# Patient Record
Sex: FEMALE | Race: WHITE | NOT HISPANIC OR LATINO | Employment: FULL TIME | URBAN - NONMETROPOLITAN AREA
[De-identification: names, ages, dates, MRNs, and addresses within clinical notes are randomized per-mention and may not be internally consistent; named-entity substitution may affect disease eponyms.]

---

## 2017-02-17 ENCOUNTER — OFFICE VISIT (OUTPATIENT)
Dept: FAMILY MEDICINE | Facility: OTHER | Age: 27
End: 2017-02-17
Attending: NURSE PRACTITIONER
Payer: COMMERCIAL

## 2017-02-17 VITALS
HEIGHT: 66 IN | WEIGHT: 190 LBS | TEMPERATURE: 98.7 F | OXYGEN SATURATION: 99 % | SYSTOLIC BLOOD PRESSURE: 108 MMHG | DIASTOLIC BLOOD PRESSURE: 82 MMHG | BODY MASS INDEX: 30.53 KG/M2 | RESPIRATION RATE: 22 BRPM | HEART RATE: 82 BPM

## 2017-02-17 DIAGNOSIS — R07.0 THROAT PAIN: Primary | ICD-10-CM

## 2017-02-17 DIAGNOSIS — L63.9 ALOPECIA AREATA: ICD-10-CM

## 2017-02-17 DIAGNOSIS — H66.001 ACUTE SUPPURATIVE OTITIS MEDIA OF RIGHT EAR WITHOUT SPONTANEOUS RUPTURE OF TYMPANIC MEMBRANE, RECURRENCE NOT SPECIFIED: ICD-10-CM

## 2017-02-17 DIAGNOSIS — E03.9 HYPOTHYROIDISM, UNSPECIFIED TYPE: ICD-10-CM

## 2017-02-17 LAB
DEPRECATED S PYO AG THROAT QL EIA: NORMAL
MICRO REPORT STATUS: NORMAL
SPECIMEN SOURCE: NORMAL
TSH SERPL DL<=0.05 MIU/L-ACNC: 7.85 MU/L (ref 0.4–4)

## 2017-02-17 PROCEDURE — 87081 CULTURE SCREEN ONLY: CPT | Performed by: NURSE PRACTITIONER

## 2017-02-17 PROCEDURE — 87880 STREP A ASSAY W/OPTIC: CPT | Performed by: NURSE PRACTITIONER

## 2017-02-17 PROCEDURE — 84443 ASSAY THYROID STIM HORMONE: CPT | Performed by: NURSE PRACTITIONER

## 2017-02-17 PROCEDURE — 99213 OFFICE O/P EST LOW 20 MIN: CPT | Performed by: NURSE PRACTITIONER

## 2017-02-17 PROCEDURE — 36415 COLL VENOUS BLD VENIPUNCTURE: CPT | Performed by: NURSE PRACTITIONER

## 2017-02-17 RX ORDER — LEVOTHYROXINE SODIUM 50 UG/1
50 TABLET ORAL DAILY
Qty: 90 TABLET | Refills: 1 | Status: SHIPPED | OUTPATIENT
Start: 2017-02-17 | End: 2017-04-18

## 2017-02-17 RX ORDER — AZITHROMYCIN 250 MG/1
TABLET, FILM COATED ORAL
Qty: 6 TABLET | Refills: 0 | Status: SHIPPED | OUTPATIENT
Start: 2017-02-17 | End: 2017-03-15

## 2017-02-17 ASSESSMENT — PAIN SCALES - GENERAL: PAINLEVEL: MILD PAIN (3)

## 2017-02-17 NOTE — NURSING NOTE
"Chief Complaint   Patient presents with     Throat Problem       Initial /82 (BP Location: Left arm, Patient Position: Chair, Cuff Size: Adult Regular)  Pulse 82  Temp 98.7  F (37.1  C) (Tympanic)  Resp 22  Ht 5' 5.5\" (1.664 m)  Wt 190 lb (86.2 kg)  SpO2 99%  BMI 31.14 kg/m2 Estimated body mass index is 31.14 kg/(m^2) as calculated from the following:    Height as of this encounter: 5' 5.5\" (1.664 m).    Weight as of this encounter: 190 lb (86.2 kg).  Medication Reconciliation: complete    "

## 2017-02-17 NOTE — MR AVS SNAPSHOT
After Visit Summary   2/17/2017    Gale Adams    MRN: [id number]           Patient Information     Date Of Birth          1990        Visit Information        Provider Department      2/17/2017 11:40 AM Danii Landa APRN [facility] [place]        Today's Diagnoses     Throat pain    -  1    Alopecia areata        Acute suppurative otitis media of right ear without spontaneous rupture of tympanic membrane, recurrence not specified          Care Instructions      Self-Care for Sore Throats  Sore throats occur for many reasons, such as colds, allergies, and infections caused by viruses or bacteria. In any case, your throat becomes red and sore. Your goal for self-care is to reduce your discomfort while giving your throat a chance to heal.    Moisten and Soothe Your Throat    Try a sip of water first thing after waking up.    Keep your throat moist by drinking 6 or more glasses of clear liquids every day.    Run a cool-air humidifier in your room overnight.    Avoid cigarette smoke.     Suck on throat lozenges, cough drops, hard candy, ice chips, or frozen fruit-juice bars. Use the sugar-free versions if your diet or medical condition require them.    Warm tea and honey  Gargle to Ease Irritation  Gargling every hour or 2 can ease irritation. Try gargling with 1 of these solutions:    1/4 teaspoon of salt in 1/2 cup of warm water    An over-the-counter anesthetic gargle  Use Medication for More Relief  Over-the-counter medication can reduce sore throat symptoms. Ask your pharmacist if you have questions about which medication to use:    Ease pain with anesthetic sprays. Aspirin or an aspirin substitute also helps. Remember, never give aspirin to anyone 18 or younger, or if you are already taking blood thinners.     For sore throats caused by allergies, try antihistamines to block the allergic reaction.    Remember: unless a sore throat is caused by a bacterial infection,  antibiotics won t help you.  Prevent Future Sore Throats    Stop smoking or reduce contact with secondhand smoke. Smoke irritates the tender throat lining.    Limit contact with pets and with allergy-causing substances, such as pollen and mold.    When you re around someone with a sore throat or cold, wash your hands frequently to keep viruses or bacteria from spreading.    Don t strain your vocal cords.  Call Your Health Care Provider  Contact your doctor if you have:    A temperature over 101 F (38.3 C)    White spots on the throat    Great difficulty swallowing    Trouble breathing    A skin rash    Recent exposure to someone else with strep bacteria    Severe hoarseness and swollen glands in the neck or jaw     7267-2045 [employer]. [address] [place] [address]. All rights reserved. This information is not intended as a substitute for professional medical care. Always follow your healthcare professional's instructions.              Follow-ups after your visit        Follow-up notes from your care team     Return if symptoms worsen or fail to improve.      Who to contact     If you have questions or need follow up information about today's clinic visit or your schedule please contact [facility] directly at [phone].  Normal or non-critical lab and imaging results will be communicated to you by [employer], letter or phone within 4 business days after the clinic has received the results. If you do not hear from us within 7 days, please contact the clinic through [employer] or phone. If you have a critical or abnormal lab result, we will notify you by phone as soon as possible.  Submit refill requests through [employer] or call your pharmacy and they will forward the refill request to us. Please allow 3 business days for your refill to be completed.          Additional Information About Your Visit        [employer] Information     [employer] gives you secure access to your electronic  "health record. If you see a primary care provider, you can also send messages to your care team and make appointments. If you have questions, please call your primary care clinic.  If you do not have a primary care provider, please call [phone] and they will assist you.        Care EveryWhere ID     This is your Care EveryWhere ID. This could be used by other organizations to access your [facility] medical records  [id number]        Your Vitals Were     Pulse Temperature Respirations Height Pulse Oximetry BMI (Body Mass Index)    82 98.7  F (37.1  C) (Tympanic) 22 5' 5.5\" (1.664 m) 99% 31.14 kg/m2       Blood Pressure from Last 3 Encounters:   02/17/17 108/82   07/12/16 120/68   06/17/16 112/70    Weight from Last 3 Encounters:   02/17/17 190 lb (86.2 kg)   07/12/16 192 lb (87.1 kg)   06/17/16 192 lb (87.1 kg)              We Performed the Following     Beta strep group A culture     Rapid strep screen     TSH          Today's Medication Changes          These changes are accurate as of: 2/17/17 12:37 PM.  If you have any questions, ask your nurse or doctor.               Start taking these medicines.        Dose/Directions    azithromycin 250 MG tablet   Commonly known as:  ZITHROMAX   Used for:  Acute suppurative otitis media of right ear without spontaneous rupture of tympanic membrane, recurrence not specified   Started by:  Danii Landa APRN CNP        Two tablets first day, then one tablet daily for four days.   Quantity:  6 tablet   Refills:  0            Where to get your medicines      These medications were sent to [facility] [place] - [address]  [address] [place] [address]     Phone:  [phone]     azithromycin 250 MG tablet                Primary Care Provider Office Phone # Fax #    BRIA Nuñez [phone]       [facility] [address] [address]        Thank you!     Thank you for choosing [facility]  [facility]. Our goal is always to provide you with excellent care. Hearing back from our patients is one way we can continue to improve our services. Please take a few minutes to complete the written survey that you may receive in the mail after your visit with us. Thank you!             Your Updated Medication List - Protect others around you: Learn how to safely use, store and throw away your medicines at www.disposemymeds.org.          This list is accurate as of: 2/17/17 12:37 PM.  Always use your most recent med list.                   Brand Name Dispense Instructions for use    azithromycin 250 MG tablet    ZITHROMAX    6 tablet    Two tablets first day, then one tablet daily for four days.       GNP PRENATAL VITAMINS 28-0.8 MG Tabs     100 tablet    Take 1 tablet by mouth daily       levothyroxine 25 MCG tablet    SYNTHROID/LEVOTHROID    90 tablet    TAKE 1 TABLET BY MOUTH DAILY

## 2017-02-17 NOTE — PROGRESS NOTES
SUBJECTIVE:                                                    Gale Adams is a 26 year old female who presents to clinic today for the following health issues:    Last week Gale had stomach flu symptoms with nausea, vomiting and diahrrea for 2 days.    RESPIRATORY SYMPTOMS      Duration: 4 days    Description  nasal congestion, rhinorrhea, sore throat, cough, ear pain right, headache, fatigue/malaise, hoarse voice.  Last week  Nausea/vomiting  and diarrhea    Severity: severe    Accompanying signs and symptoms: None    History (predisposing factors):  none    Precipitating or alleviating factors: None    Therapies tried and outcome:  acetaminophen [place], severe cold and flu     Gale states she noticed any area at the back of her scalp without any hair. She stated she had her hair done last week and nothing was said and she just noticed it today.  Gale has a recent diagnosis of thyroid disease and take levothyroxine.         Problem list and histories reviewed & adjusted, as indicated.  Additional history: as documented    Patient Active Problem List   Diagnosis     Swelling, mass, or lump in head and neck     Left knee injury     ACP (advance care planning)     History reviewed. No pertinent past surgical history.    Social History   Substance Use Topics     Smoking status: Never Smoker     Smokeless tobacco: Never Used     Alcohol use No     Family History   Problem Relation Age of Onset     CEREBROVASCULAR DISEASE Maternal Grandmother      CVA     DIABETES Maternal Grandmother      Breast Cancer Maternal Grandmother      Hypertension Father      Hyperlipidemia Mother      Depression Mother      Obesity Mother      Other Cancer Paternal Grandmother      Coronary Artery Disease No family hx of      Colon Cancer No family hx of      Prostate Cancer No family hx of      Anxiety Disorder No family hx of      MENTAL ILLNESS No family hx of      Substance Abuse No family hx of      Anesthesia Reaction  "No family hx of      Asthma No family hx of      OSTEOPOROSIS No family hx of      Genetic Disorder No family hx of      Thyroid Disease No family hx of          Current Outpatient Prescriptions   Medication Sig Dispense Refill     levothyroxine (SYNTHROID, LEVOTHROID) 25 MCG tablet TAKE 1 TABLET BY MOUTH DAILY 90 tablet 1     Prenatal Vit-Fe Fumarate-FA (GNP PRENATAL VITAMINS) 28-0.8 MG TABS Take 1 tablet by mouth daily 100 tablet 3     Allergies   Allergen Reactions     Amoxicillin Rash       ROS:  C: NEGATIVE for fever, chills, change in weight  ENT/MOUTH: ear pain bilateral, nasal congestion, sinus pressure, sore throat and tooth pain  RESP:cough-non productive  CV: NEGATIVE for chest pain, palpitations or peripheral edema    OBJECTIVE:                                                    /82 (BP Location: Left arm, Patient Position: Chair, Cuff Size: Adult Regular)  Pulse 82  Temp 98.7  F (37.1  C) (Tympanic)  Resp 22  Ht 5' 5.5\" (1.664 m)  Wt 190 lb (86.2 kg)  SpO2 99%  BMI 31.14 kg/m2  Body mass index is 31.14 kg/(m^2).   GENERAL: alert and no distress  HENT: normal cephalic/atraumatic, both ears: clear effusion, nose and mouth without ulcers or lesions, oropharynx clear, oral mucous membranes moist and sinuses: maxillary tenderness on both sides  NECK: no adenopathy, no asymmetry, masses, or scars and thyroid normal to palpation  RESP: decreased breath sounds R lower posterior and L lower posterior  CV: regular rate and rhythm, normal S1 S2, no S3 or S4, no murmur, click or rub, no peripheral edema and peripheral pulses strong  SKIN: no suspicious lesions or rashes and hair quality-alopecia at posterior scalp  PSYCH: mentation appears normal, affect normal/bright  LYMPH: normal ant/post cervical, supraclavicular nodes    Diagnostic Test Results:  Results for orders placed or performed in visit on 02/17/17 (from the past 24 hour(s))   Rapid strep screen   Result Value Ref Range    Specimen Description " Throat     Rapid Strep A Screen       NEGATIVE: No Group A streptococcal antigen detected by immunoassay, await   culture report.      Micro Report Status FINAL 02/17/2017         ASSESSMENT:                                                        PLAN:                                                    ASSESSMENT / PLAN:  (R07.0) Throat pain  (primary encounter diagnosis)  Comment:   Plan:  Rapid strep screen,    Beta strep group A culture   Stay hydrated   Cool mist humidifier   Cough medicine   Warm tea and honey   Tylenol or ibuprofen for comfort            (L63.9) Alopecia areata  Comment:   Plan:  TSH - will call with result            (H66.001) Acute suppurative otitis media of right ear without spontaneous rupture of tympanic membrane, recurrence not specified  Comment:   Plan:  azithromycin (ZITHROMAX) 250 MG tablet            Follow up if no improvement or worsening symptoms          DAMON [facility]er [facility] [address]

## 2017-02-17 NOTE — PATIENT INSTRUCTIONS
Self-Care for Sore Throats  Sore throats occur for many reasons, such as colds, allergies, and infections caused by viruses or bacteria. In any case, your throat becomes red and sore. Your goal for self-care is to reduce your discomfort while giving your throat a chance to heal.    Moisten and Soothe Your Throat    Try a sip of water first thing after waking up.    Keep your throat moist by drinking 6 or more glasses of clear liquids every day.    Run a cool-air humidifier in your room overnight.    Avoid cigarette smoke.     Suck on throat lozenges, cough drops, hard candy, ice chips, or frozen fruit-juice bars. Use the sugar-free versions if your diet or medical condition require them.    Warm tea and honey  Gargle to Ease Irritation  Gargling every hour or 2 can ease irritation. Try gargling with 1 of these solutions:    1/4 teaspoon of salt in 1/2 cup of warm water    An over-the-counter anesthetic gargle  Use Medication for More Relief  Over-the-counter medication can reduce sore throat symptoms. Ask your pharmacist if you have questions about which medication to use:    Ease pain with anesthetic sprays. Aspirin or an aspirin substitute also helps. Remember, never give aspirin to anyone 18 or younger, or if you are already taking blood thinners.     For sore throats caused by allergies, try antihistamines to block the allergic reaction.    Remember: unless a sore throat is caused by a bacterial infection, antibiotics won t help you.  Prevent Future Sore Throats    Stop smoking or reduce contact with secondhand smoke. Smoke irritates the tender throat lining.    Limit contact with pets and with allergy-causing substances, such as pollen and mold.    When you re around someone with a sore throat or cold, wash your hands frequently to keep viruses or bacteria from spreading.    Don t strain your vocal cords.  Call Your Health Care Provider  Contact your doctor if you have:    A temperature over 101 F  (38.3 C)    White spots on the throat    Great difficulty swallowing    Trouble breathing    A skin rash    Recent exposure to someone else with strep bacteria    Severe hoarseness and swollen glands in the neck or jaw     4578-1644 [employer] [address] [place] [address], [place] [address]. All rights reserved. This information is not intended as a substitute for professional medical care. Always follow your healthcare professional's instructions.

## 2017-02-19 LAB
BACTERIA SPEC CULT: NORMAL
MICRO REPORT STATUS: NORMAL
SPECIMEN SOURCE: NORMAL

## 2017-03-15 ENCOUNTER — OFFICE VISIT (OUTPATIENT)
Dept: FAMILY MEDICINE | Facility: OTHER | Age: 27
End: 2017-03-15
Attending: NURSE PRACTITIONER
Payer: COMMERCIAL

## 2017-03-15 VITALS
DIASTOLIC BLOOD PRESSURE: 74 MMHG | OXYGEN SATURATION: 98 % | BODY MASS INDEX: 30.86 KG/M2 | RESPIRATION RATE: 20 BRPM | HEART RATE: 79 BPM | SYSTOLIC BLOOD PRESSURE: 112 MMHG | TEMPERATURE: 98.7 F | HEIGHT: 66 IN | WEIGHT: 192 LBS

## 2017-03-15 DIAGNOSIS — E03.9 HYPOTHYROIDISM, UNSPECIFIED TYPE: Primary | ICD-10-CM

## 2017-03-15 PROCEDURE — 99213 OFFICE O/P EST LOW 20 MIN: CPT | Performed by: NURSE PRACTITIONER

## 2017-03-15 ASSESSMENT — PAIN SCALES - GENERAL: PAINLEVEL: NO PAIN (0)

## 2017-03-15 NOTE — NURSING NOTE
"Chief Complaint   Patient presents with     Thyroid Disease     fu on start of new thyroid medication       Initial /74 (BP Location: Right arm, Patient Position: Chair, Cuff Size: Adult Regular)  Pulse 79  Temp 98.7  F (37.1  C) (Tympanic)  Resp 20  Ht 5' 5.5\" (1.664 m)  Wt 192 lb (87.1 kg)  SpO2 98%  BMI 31.46 kg/m2 Estimated body mass index is 31.46 kg/(m^2) as calculated from the following:    Height as of this encounter: 5' 5.5\" (1.664 m).    Weight as of this encounter: 192 lb (87.1 kg).  Medication Reconciliation: complete   Ally Win      "

## 2017-03-15 NOTE — PROGRESS NOTES
SUBJECTIVE:                                                    Gale Adams is a 26 year old female who presents to clinic today for the following health issues:      Hypothyroidism Follow-up      Since last visit, patient describes the following symptoms: Weight stable, no hair loss, no skin changes, no constipation, no loose stools    Increased energy since increase     Bold spot remains on the back of the scalp - appears to be getting a couple of hair follicles back        Amount of exercise or physical activity: 4-5 days/week for an average of 30-45 minutes    Problems taking medications regularly: No    Medication side effects: none  Diet: regular (no restrictions)        Problem list and histories reviewed & adjusted, as indicated.  Additional history: as documented    Patient Active Problem List   Diagnosis     Swelling, mass, or lump in head and neck     Left knee injury     ACP (advance care planning)     History reviewed. No pertinent past surgical history.    Social History   Substance Use Topics     Smoking status: Never Smoker     Smokeless tobacco: Never Used     Alcohol use No     Family History   Problem Relation Age of Onset     CEREBROVASCULAR DISEASE Maternal Grandmother      CVA     DIABETES Maternal Grandmother      Breast Cancer Maternal Grandmother      Hypertension Father      Hyperlipidemia Mother      Depression Mother      Obesity Mother      Other Cancer Paternal Grandmother      Coronary Artery Disease No family hx of      Colon Cancer No family hx of      Prostate Cancer No family hx of      Anxiety Disorder No family hx of      MENTAL ILLNESS No family hx of      Substance Abuse No family hx of      Anesthesia Reaction No family hx of      Asthma No family hx of      OSTEOPOROSIS No family hx of      Genetic Disorder No family hx of      Thyroid Disease No family hx of          Current Outpatient Prescriptions   Medication Sig Dispense Refill     levothyroxine  "(SYNTHROID/LEVOTHROID) 50 MCG tablet Take 1 tablet (50 mcg) by mouth daily 90 tablet 1     [DISCONTINUED] levothyroxine (SYNTHROID, LEVOTHROID) 25 MCG tablet TAKE 1 TABLET BY MOUTH DAILY (Patient not taking: Reported on 3/15/2017) 90 tablet 1     Allergies   Allergen Reactions     Amoxicillin Rash       Reviewed and updated as needed this visit by clinical staff  Tobacco  Allergies  Meds  Med Hx  Surg Hx  Fam Hx  Soc Hx      Reviewed and updated as needed this visit by Provider         ROS:  C: NEGATIVE for fever, chills, change in weight  E/M: NEGATIVE for ear, mouth and throat problems  R: NEGATIVE for significant cough or SOB  CV: NEGATIVE for chest pain, palpitations or peripheral edema  ENDOCRINE: NEGATIVE for temperature intolerance, skin/hair changes - negative for further hair loss    OBJECTIVE:                                                    /74 (BP Location: Right arm, Patient Position: Chair, Cuff Size: Adult Regular)  Pulse 79  Temp 98.7  F (37.1  C) (Tympanic)  Resp 20  Ht 5' 5.5\" (1.664 m)  Wt 192 lb (87.1 kg)  SpO2 98%  BMI 31.46 kg/m2  Body mass index is 31.46 kg/(m^2).   GENERAL: healthy, alert and no distress  NECK: no adenopathy and no asymmetry, masses, or scars  RESP: lungs clear to auscultation - no rales, rhonchi or wheezes  CV: regular rate and rhythm, normal S1 S2, no S3 or S4, no murmur, click or rub, no peripheral edema and peripheral pulses strong  PSYCH: mentation appears normal, affect normal/bright    Diagnostic Test Results:  none      ASSESSMENT:                                                        PLAN:                                                    ASSESSMENT / PLAN:  (E03.9) Hypothyroidism, unspecified type  (primary encounter diagnosis)  Comment:   Plan:  TSH - future order - should have checked in one month          Follow up as needed          Danii Landa, DAMON [facility] [place]    "

## 2017-04-18 ENCOUNTER — OFFICE VISIT (OUTPATIENT)
Dept: FAMILY MEDICINE | Facility: OTHER | Age: 27
End: 2017-04-18
Attending: NURSE PRACTITIONER
Payer: COMMERCIAL

## 2017-04-18 VITALS
HEIGHT: 66 IN | HEART RATE: 76 BPM | RESPIRATION RATE: 20 BRPM | TEMPERATURE: 98.6 F | BODY MASS INDEX: 30.53 KG/M2 | WEIGHT: 190 LBS | OXYGEN SATURATION: 98 % | SYSTOLIC BLOOD PRESSURE: 108 MMHG | DIASTOLIC BLOOD PRESSURE: 60 MMHG

## 2017-04-18 DIAGNOSIS — L65.9 ALOPECIA: Primary | ICD-10-CM

## 2017-04-18 DIAGNOSIS — E03.9 HYPOTHYROIDISM, UNSPECIFIED TYPE: ICD-10-CM

## 2017-04-18 LAB — TSH SERPL DL<=0.05 MIU/L-ACNC: 6.72 MU/L (ref 0.4–4)

## 2017-04-18 PROCEDURE — 36415 COLL VENOUS BLD VENIPUNCTURE: CPT | Performed by: NURSE PRACTITIONER

## 2017-04-18 PROCEDURE — 84443 ASSAY THYROID STIM HORMONE: CPT | Performed by: NURSE PRACTITIONER

## 2017-04-18 PROCEDURE — 99213 OFFICE O/P EST LOW 20 MIN: CPT | Performed by: NURSE PRACTITIONER

## 2017-04-18 RX ORDER — LEVOTHYROXINE SODIUM 100 UG/1
100 TABLET ORAL DAILY
Qty: 30 TABLET | Refills: 1 | Status: SHIPPED | OUTPATIENT
Start: 2017-04-18 | End: 2017-05-30

## 2017-04-18 ASSESSMENT — PAIN SCALES - GENERAL: PAINLEVEL: NO PAIN (0)

## 2017-04-18 NOTE — MR AVS SNAPSHOT
After Visit Summary   4/18/2017    Gale Adams    MRN: [id number]           Patient Information     Date Of Birth          1990        Visit Information        Provider Department      4/18/2017 1:30 PM Danii Landa APRN [facility] [place]        Today's Diagnoses     Alopecia    -  1    Hypothyroidism, unspecified type          Care Instructions      Hypothyroidism       You have hypothyroidism. This means your thyroid gland is not making enough thyroid hormone. This hormone is vital to body growth and metabolism. If you don t make enough, many body processes slow down. This can cause symptoms throughout the body. Hypothyroidism can range from mild to severe. The most severe form is called myxedema.  There are a number of causes of hypothyroidism. A common cause is Hashimoto s disease. This disease causes the body s own immune system to attack the thyroid gland. When you have certain treatments, such as surgery to remove the thyroid gland, this can also cause hypothyroidism.  Symptoms of hypothyroidism can include:    Fatigue    Trouble concentrating or thinking clearly; forgetfulness    Dry skin    Hair loss    Weight gain    Low tolerance to cold    Constipation    Depression    Personality changes    Tingling or prickling of the hands or feet    Heavy, absent, or irregular periods (women only)  Older adults may sometimes have other symptoms. These can include:    Muscle aches and weakness    Confusion    Incontinence (unable to control urine or stool)    Trouble moving around    Falling  Treatment for hypothyroidism involves taking thyroid hormone pills daily. These pills replace the hormone your thyroid doesn t make. You will likely need to take a daily pill for the rest of your life. Tips for taking this medicine are given below.  Home care  Tips for taking your medicine    Take your thyroid hormone pills as prescribed by your healthcare provider. This is most  often 1 pill a day on an empty stomach. Use a pillbox labeled with the days of the week. This will help you remember to take your pill each day.    Don t take products that contain iron and calcium or antacids within 4 hours of taking your thyroid hormone pills.    Don t take other medicines with your thyroid hormone pill without checking with your provider first.    Tell your provider if you have any side effects from your medicines that bother you.    Never change the dosage or stop taking your thyroid pills without talking to your provider first.  General care    Always talk with your provider before trying other medicines or treatments for your thyroid problem.    If you see other healthcare providers, be sure to let them know about your thyroid problem.  Follow-up care  See your healthcare provider for checkups as advised. You may need regular tests to check the level of thyroid hormone in your blood.  When to seek medical advice  Call your healthcare provider right away if any of these occur:    New symptoms develop    Symptoms return, continue, or worsen even after treatment    Extreme fatigue    Puffy hands, face, or feet    Fast or irregular heartbeat    Confusion  Call 911  Call 911 right away if any of these occur:    Fainting    Chest pain    Shortness of breath or trouble breathing    2755-5613 [employer]. [address] [place], [place] [address]. All rights reserved. This information is not intended as a substitute for professional medical care. Always follow your healthcare professional's instructions.              Follow-ups after your visit        Additional Services     DERMATOLOGY REFERRAL       Your provider has referred you to: [facility]    Please be aware that coverage of these services is subject to the terms and limitations of your health insurance plan.  Call member services at your health plan with any benefit or coverage questions.      Please bring the  "following with you to your appointment:    (1) Any X-Rays, CTs or MRIs which have been performed.  Contact the facility where they were done to arrange for  prior to your scheduled appointment.    (2) List of current medications  (3) This referral request   (4) Any documents/labs given to you for this referral                  Who to contact     If you have questions or need follow up information about today's clinic visit or your schedule please contact [facility] [place] directly at [phone].  Normal or non-critical lab and imaging results will be communicated to you by MyChart, letter or phone within 4 business days after the clinic has received the results. If you do not hear from us within 7 days, please contact the clinic through [employer] or phone. If you have a critical or abnormal lab result, we will notify you by phone as soon as possible.  Submit refill requests through [employer] or call your pharmacy and they will forward the refill request to us. Please allow 3 business days for your refill to be completed.          Additional Information About Your Visit        [employer] Information     [employer] gives you secure access to your electronic health record. If you see a primary care provider, you can also send messages to your care team and make appointments. If you have questions, please call your primary care clinic.  If you do not have a primary care provider, please call [phone] and they will assist you.        Care EveryWhere ID     This is your Care EveryWhere ID. This could be used by other organizations to access your [facility] medical records  [id number]        Your Vitals Were     Pulse Temperature Respirations Height Pulse Oximetry BMI (Body Mass Index)    76 98.6  F (37  C) (Tympanic) 20 5' 5.5\" (1.664 m) 98% 31.14 kg/m2       Blood Pressure from Last 3 Encounters:   04/18/17 108/60   03/15/17 112/74   02/17/17 108/82    Weight from Last 3 Encounters:   04/18/17 190 lb (86.2 kg) "   03/15/17 192 lb (87.1 kg)   02/17/17 190 lb (86.2 kg)              We Performed the Following     DERMATOLOGY REFERRAL     TSH        Primary Care Provider Office Phone # Fax #    BRIA Nuñez [phone]       [facility] [place] [address]  [place] [address]        Thank you!     Thank you for choosing [facility] [place]  for your care. Our goal is always to provide you with excellent care. Hearing back from our patients is one way we can continue to improve our services. Please take a few minutes to complete the written survey that you may receive in the mail after your visit with us. Thank you!             Your Updated Medication List - Protect others around you: Learn how to safely use, store and throw away your medicines at www.disposemymeds.org.          This list is accurate as of: 4/18/17  2:07 PM.  Always use your most recent med list.                   Brand Name Dispense Instructions for use    levothyroxine 50 MCG tablet    SYNTHROID/LEVOTHROID    90 tablet    Take 1 tablet (50 mcg) by mouth daily

## 2017-04-18 NOTE — Clinical Note
Gale's came to see me in February for illness noted to have alopecia at that time - we checked her TSH and it was elevated increased her levothyroxine. She returned today and continues to have 1 spot of alopecia in the back of her head re checked her TSH again continues to be elevated will increase her to 100 mcg today. Made a referral to derm either here at at [facility].  I am letting her know she needs to be seen again in 2 months. She did state she is getting her energy back.  Thanks  Danii BONILLA

## 2017-04-18 NOTE — PROGRESS NOTES
SUBJECTIVE:                                                    Gale Adams is a 26 year old female who presents to clinic today for the following health issues:      Hypothyroidism Follow-up      Since last visit, patient describes the following symptoms: Weight stable, questionable hair loss changes, no skin changes, no constipation, no loose stools.       Amount of exercise or physical activity: 4-5 days/week for an average of 45-60 minutes    Problems taking medications regularly: No    Medication side effects: none    Diet: regular (no restrictions)          Problem list and histories reviewed & adjusted, as indicated.  Additional history: as documented    Patient Active Problem List   Diagnosis     Swelling, mass, or lump in head and neck     Left knee injury     ACP (advance care planning)     Hypothyroidism, unspecified type     History reviewed. No pertinent surgical history.    Social History   Substance Use Topics     Smoking status: Never Smoker     Smokeless tobacco: Never Used     Alcohol use No     Family History   Problem Relation Age of Onset     CEREBROVASCULAR DISEASE Maternal Grandmother      CVA     DIABETES Maternal Grandmother      Breast Cancer Maternal Grandmother      Hypertension Father      Hyperlipidemia Mother      Depression Mother      Obesity Mother      Other Cancer Paternal Grandmother      Coronary Artery Disease No family hx of      Colon Cancer No family hx of      Prostate Cancer No family hx of      Anxiety Disorder No family hx of      MENTAL ILLNESS No family hx of      Substance Abuse No family hx of      Anesthesia Reaction No family hx of      Asthma No family hx of      OSTEOPOROSIS No family hx of      Genetic Disorder No family hx of      Thyroid Disease No family hx of          Current Outpatient Prescriptions   Medication Sig Dispense Refill     levothyroxine (SYNTHROID/LEVOTHROID) 50 MCG tablet Take 1 tablet (50 mcg) by mouth daily 90 tablet 1     Allergies  "  Allergen Reactions     Amoxicillin Rash       Reviewed and updated as needed this visit by clinical staff  Tobacco  Allergies  Meds  Med Hx  Surg Hx  Fam Hx  Soc Hx      Reviewed and updated as needed this visit by Provider         ROS:  C: NEGATIVE for fever, chills, change in weight  INTEGUMENTARY/SKIN: hair loss  E/M: NEGATIVE for ear, mouth and throat problems  R: NEGATIVE for significant cough or SOB  CV: NEGATIVE for chest pain, palpitations or peripheral edema  ENDOCRINE: increased energy, hair loss and Hx thyroid disease    OBJECTIVE:                                                    /60 (BP Location: Right arm, Patient Position: Chair, Cuff Size: Adult Large)  Pulse 76  Temp 98.6  F (37  C) (Tympanic)  Resp 20  Ht 5' 5.5\" (1.664 m)  Wt 190 lb (86.2 kg)  SpO2 98%  BMI 31.14 kg/m2  Body mass index is 31.14 kg/(m^2).   GENERAL: healthy, alert and no distress  RESP: lungs clear to auscultation - no rales, rhonchi or wheezes  CV: regular rate and rhythm, normal S1 S2, no S3 or S4, no murmur, click or rub, no peripheral edema and peripheral pulses strong  SKIN: hair quality alopecia - back of scalp     Diagnostic Test Results:  Results for orders placed or performed in visit on 04/18/17 (from the past 24 hour(s))   TSH   Result Value Ref Range    TSH 6.72 (H) 0.40 - 4.00 mU/L        ASSESSMENT:                                                    HYPOTHYROIDISM, patient remains hypothyroid                                   PLAN:                                                    ASSESSMENT / PLAN:  (L65.9) Alopecia  (primary encounter diagnosis)  Comment:   Plan:  DERMATOLOGY REFERRAL,    DERMATOLOGY REFERRAL            (E03.9) Hypothyroidism, unspecified type  Comment:   Plan:  levothyroxine (SYNTHROID/LEVOTHROID) 100 MCG tablet              Follow up in 2 months for re check of TSH level          Danii Landa, [facility] [place]  "

## 2017-04-18 NOTE — NURSING NOTE
"Chief Complaint   Patient presents with     Thyroid Disease     hair loss       Initial /60 (BP Location: Right arm, Patient Position: Chair, Cuff Size: Adult Large)  Pulse 76  Temp 98.6  F (37  C) (Tympanic)  Resp 20  Ht 5' 5.5\" (1.664 m)  Wt 190 lb (86.2 kg)  SpO2 98%  BMI 31.14 kg/m2 Estimated body mass index is 31.14 kg/(m^2) as calculated from the following:    Height as of this encounter: 5' 5.5\" (1.664 m).    Weight as of this encounter: 190 lb (86.2 kg).  Medication Reconciliation: complete   Ally Win      "

## 2017-04-18 NOTE — PATIENT INSTRUCTIONS
Hypothyroidism       You have hypothyroidism. This means your thyroid gland is not making enough thyroid hormone. This hormone is vital to body growth and metabolism. If you don t make enough, many body processes slow down. This can cause symptoms throughout the body. Hypothyroidism can range from mild to severe. The most severe form is called myxedema.  There are a number of causes of hypothyroidism. A common cause is Hashimoto s disease. This disease causes the body s own immune system to attack the thyroid gland. When you have certain treatments, such as surgery to remove the thyroid gland, this can also cause hypothyroidism.  Symptoms of hypothyroidism can include:    Fatigue    Trouble concentrating or thinking clearly; forgetfulness    Dry skin    Hair loss    Weight gain    Low tolerance to cold    Constipation    Depression    Personality changes    Tingling or prickling of the hands or feet    Heavy, absent, or irregular periods (women only)  Older adults may sometimes have other symptoms. These can include:    Muscle aches and weakness    Confusion    Incontinence (unable to control urine or stool)    Trouble moving around    Falling  Treatment for hypothyroidism involves taking thyroid hormone pills daily. These pills replace the hormone your thyroid doesn t make. You will likely need to take a daily pill for the rest of your life. Tips for taking this medicine are given below.  Home care  Tips for taking your medicine    Take your thyroid hormone pills as prescribed by your healthcare provider. This is most often 1 pill a day on an empty stomach. Use a pillbox labeled with the days of the week. This will help you remember to take your pill each day.    Don t take products that contain iron and calcium or antacids within 4 hours of taking your thyroid hormone pills.    Don t take other medicines with your thyroid hormone pill without checking with your provider first.    Tell your provider if you have  any side effects from your medicines that bother you.    Never change the dosage or stop taking your thyroid pills without talking to your provider first.  General care    Always talk with your provider before trying other medicines or treatments for your thyroid problem.    If you see other healthcare providers, be sure to let them know about your thyroid problem.  Follow-up care  See your healthcare provider for checkups as advised. You may need regular tests to check the level of thyroid hormone in your blood.  When to seek medical advice  Call your healthcare provider right away if any of these occur:    New symptoms develop    Symptoms return, continue, or worsen even after treatment    Extreme fatigue    Puffy hands, face, or feet    Fast or irregular heartbeat    Confusion  Call 911  Call 911 right away if any of these occur:    Fainting    Chest pain    Shortness of breath or trouble breathing    5587-9758 [employer]. [address] [place] [address], [place] [address]. All rights reserved. This information is not intended as a substitute for professional medical care. Always follow your healthcare professional's instructions.

## 2017-04-21 ENCOUNTER — TRANSFERRED RECORDS (OUTPATIENT)
Dept: HEALTH INFORMATION MANAGEMENT | Facility: HOSPITAL | Age: 27
End: 2017-04-21

## 2017-05-11 ENCOUNTER — HOSPITAL ENCOUNTER (EMERGENCY)
Facility: HOSPITAL | Age: 27
Discharge: HOME | End: 2017-05-11
Attending: NURSE PRACTITIONER | Admitting: NURSE PRACTITIONER
Payer: COMMERCIAL

## 2017-05-11 VITALS
SYSTOLIC BLOOD PRESSURE: 149 MMHG | OXYGEN SATURATION: 100 % | TEMPERATURE: 97.2 F | RESPIRATION RATE: 16 BRPM | DIASTOLIC BLOOD PRESSURE: 90 MMHG

## 2017-05-11 DIAGNOSIS — R21 RASH AND NONSPECIFIC SKIN ERUPTION: ICD-10-CM

## 2017-05-11 PROCEDURE — 99213 OFFICE O/P EST LOW 20 MIN: CPT | Performed by: NURSE PRACTITIONER

## 2017-05-11 PROCEDURE — 99213 OFFICE O/P EST LOW 20 MIN: CPT

## 2017-05-11 RX ORDER — TRIAMCINOLONE ACETONIDE 1 MG/G
CREAM TOPICAL
Qty: 30 G | Refills: 0 | Status: SHIPPED | OUTPATIENT
Start: 2017-05-11 | End: 2017-07-03

## 2017-05-11 ASSESSMENT — ENCOUNTER SYMPTOMS
EYES NEGATIVE: 1
RESPIRATORY NEGATIVE: 1
MUSCULOSKELETAL NEGATIVE: 1
CONSTITUTIONAL NEGATIVE: 1
CARDIOVASCULAR NEGATIVE: 1
NEUROLOGICAL NEGATIVE: 1

## 2017-05-11 NOTE — ED AVS SNAPSHOT
[facility]    [address]    [place] [address]    Phone:  [phone]                                       Gale Adams   MRN: [id number]    Department:  [facility]   Date of Visit:  5/11/2017           Patient Information     Date Of Birth          1990        Your diagnoses for this visit were:     Rash and nonspecific skin eruption        You were seen by Gerda Lee NP.      Follow-up Information     Follow up with Danii Landa APRN CNP.    Specialty:  Family Practice    Why:  As needed, If symptoms worsen    Contact information:    [facility]  [address]  [place] [address] [phone]          Follow up with [facility].    Specialty:  EMERGENCY MEDICINE    Why:  As needed, If symptoms worsen    Contact information:    [address]  [place] [address] [phone]    Additional information:    From [place]: Take [address]. Turn left at [address]. Turn left at the first stoplight on [address]. At the first stop sign, take a right onto [address]. Take a left into the parking lot and continue through until you reach the North enterance of the building.       From [place]: Take [address]. Take the [address] ramp towards [place]. Turn left onto [address]. Take a slight right onto [address]. Turn left at the first stoplight on [address]. At the first stop sign, take a right onto [address]. Take a left into the parking lot and continue through until you reach the North enterance of the building.       From [place]: Take [address]. Take a right at [address]. At the first stop sign, take a right onto [address]. Take a left into the parking lot and continue through until you reach the North enterance of the building.         Discharge Instructions         Self-Care for Skin Rashes  When your skin reacts to a  substance your body is sensitive to, it can cause a rash. You can treat most rashes at home by keeping the skin clean and dry. Many rashes may get better on their own within 2 to 3 days. You may need medical attention if your rash itches, drains, or hurts, particularly if the rash is getting worse.  What can cause a skin rash?    Sun poisoning, caused by too much exposure to the sun    An irritant or allergic reaction to a certain type of food, plant, or chemical, such as  shellfish, poison ivy, and or cleaning products    An infection caused by a fungus (ringworm), virus (chickenpox), or bacteria (strep)    Bites or infestation caused by insects or pests, such as ticks, lice, or mites    Dry skin, which is often seen during the winter months and in older people  How can I control itching and skin damage?    Take soothing  lukewarm baths in a colloidal oatmeal product. You can buy this at the [employer].    Do your best not to scratch. Clip fingernails short, especially in young children, to reduce skin damage if scratching does occur.    Use moisturizing skin lotion instead of scratching your dry skin.    Use sunscreen whenever going out into direct sun.    Use only mild cleansing agents whenever possible.    Wash with mild, nonirritating soap and warm water.    Wear clothing that breathes, such as cotton shirts or canvas shoes.    If fluid is seeping from the rash, cover it loosely with clean gauze to absorb the discharge.    Many rashes are contagious. Prevent the rash from spreading to others by washing your hands often before or after touching others with any skin rash.  Use medicine    Antihistamines such as diphenhydramine can help control itching. But use with caution because they can make you drowsy.    Using over-the-counter hydrocortisone cream on small rashes may help reduce swelling and itching    Most over-the-counter antifungal medicines can treat athlete s foot and many other fungal infections of the  skin.  Check with your healthcare provider  Call your healthcare provider if:    You were told that you have a fungal infection on your skin to make sure you have the correct type of medicine    You have questions or concerns about medicines or their side effects.      Call 911  Call 911 if either of these occur:    Your tongue or lips start to swell    You have difficulty breathing      Call your healthcare provider  Call your healthcare provider if any of these occur:    Temperature  of more than 101.0 F (38.3 C), or as directed    Sore throat, a cough, or unusual fatigue    Red, oozy, or painful rash gets worse. These are signs of infection.    Rash covers your face, genitals, or most of your body    Crusty sores or red rings that begin to spread    You were exposed to someone who has a contagious rash, such as scabies or lice.    Red bull s-eye rash with a white center (a sign of Lyme disease)    You were told that you have resistant bacteria (MRSA) on your skin.     0915-1778 [employer]. [address] [place] [address]. All rights reserved. This information is not intended as a substitute for professional medical care. Always follow your healthcare professional's instructions.             Review of your medicines      START taking        Dose / Directions Last dose taken    triamcinolone 0.1 % cream   Commonly known as:  KENALOG   Quantity:  30 g        Apply sparingly to affected area three times daily for 14 days.   Refills:  0          Our records show that you are taking the medicines listed below. If these are incorrect, please call your family doctor or clinic.        Dose / Directions Last dose taken    levothyroxine 100 MCG tablet   Commonly known as:  SYNTHROID/LEVOTHROID   Dose:  100 mcg   Quantity:  30 tablet        Take 1 tablet (100 mcg) by mouth daily   Refills:  1                Prescriptions were sent or printed at these locations (1 Prescription)                    [id number] [place] - [address]   [address] [address]    Telephone:  [phone]   Fax:  [phone]   Hours:                  E-Prescribed (1 of 1)         triamcinolone (KENALOG) 0.1 % cream                Orders Needing Specimen Collection     None      Pending Results     No orders found from 5/9/2017 to 5/12/2017.            Pending Culture Results     No orders found from 5/9/2017 to 5/12/2017.            Thank you for choosing [facility]       Thank you for choosing [facility] for your care. Our goal is always to provide you with excellent care. Hearing back from our patients is one way we can continue to improve our services. Please take a few minutes to complete the written survey that you may receive in the mail after you visit with us. Thank you!        [employer] Information     The [employer] gives you secure access to your electronic health record. If you see a primary care provider, you can also send messages to your care team and make appointments. If you have questions, please call your primary care clinic.  If you do not have a primary care provider, please call [phone] and they will assist you.        Care EveryWhere ID     This is your Care EveryWhere ID. This could be used by other organizations to access your [facility] medical records  [id number]        After Visit Summary       This is your record. Keep this with you and show to your community pharmacist(s) and doctor(s) at your next visit.

## 2017-05-11 NOTE — ED AVS SNAPSHOT
[facility]    [address] [address]    Phone:  [phone]                                       Gale Adams   MRN: [id number]    Department:  [facility]   Date of Visit:  5/11/2017           After Visit Summary Signature Page     I have received my discharge instructions, and my questions have been answered. I have discussed any challenges I see with this plan with the nurse or doctor.    ..........................................................................................................................................  Patient/Patient Representative Signature      ..........................................................................................................................................  Patient Representative Print Name and Relationship to Patient    ..................................................               ................................................  Date                                            Time    ..........................................................................................................................................  Reviewed by Signature/Title    ...................................................              ..............................................  Date                                                            Time

## 2017-05-12 NOTE — ED NOTES
Pt here alone. Pt complains of a rash under her R breast and comes down the trunk. Rash is raised red bumps. She stated that she noticed the rash appearing yesterday and said that the rash doubled today. Some pruritus but it is not excessive.

## 2017-05-12 NOTE — ED PROVIDER NOTES
History     Chief Complaint   Patient presents with     Rash     notes rash on abd and under breast     The history is provided by the patient. No  was used.     Gale Adams is a 26 year old female who presents with a rash on her abdomen for one day. She used a zyrtec which was somewhat helpful this am but rash has spread.  No new lotions , medications, soaps, foods.  She denies any respiratory sx.     I have reviewed the Medications, Allergies, Past Medical and Surgical History, and Social History in the Epic system.    Review of Systems   Constitutional: Negative.    HENT: Negative.    Eyes: Negative.    Respiratory: Negative.    Cardiovascular: Negative.    Genitourinary: Negative.    Musculoskeletal: Negative.    Skin: Positive for rash.   Neurological: Negative.        Physical Exam   BP: 149/90  Heart Rate: 79  Temp: 97.2  F (36.2  C)  Resp: 16  SpO2: 100 %  Physical Exam   Constitutional: She is oriented to person, place, and time. She appears well-developed and well-nourished. No distress.   HENT:   Head: Normocephalic and atraumatic.   Mouth/Throat: No oropharyngeal exudate.   Eyes: Conjunctivae and EOM are normal. Pupils are equal, round, and reactive to light. Right eye exhibits no discharge. Left eye exhibits no discharge. No scleral icterus.   Neck: Normal range of motion.   Cardiovascular: Normal rate.    Pulmonary/Chest: Effort normal and breath sounds normal. She has no wheezes. She has no rales.   Abdominal: Soft.   Musculoskeletal: Normal range of motion.   Neurological: She is alert and oriented to person, place, and time.   Skin: Skin is warm. Rash (macular , papular rash small lesions, blanchable on the abdomen and under the right breast, well generalized and spaced, lesions are small 1-3 mm in size) noted. She is not diaphoretic.   Nursing note and vitals reviewed.      ED Course     ED Course     Procedures               Labs Ordered and Resulted from Time of ED  Arrival Up to the Time of Departure from the ED - No data to display    Assessments & Plan (with Medical Decision Making)     I have reviewed the nursing notes.    I have reviewed the findings, diagnosis, plan and need for follow up with the patient.    Discharge Medication List as of 5/11/2017  8:07 PM      START taking these medications    Details   triamcinolone (KENALOG) 0.1 % cream Apply sparingly to affected area three times daily for 14 days.Disp-30 g, [id number]             Pathophysiology, possible etiology and treatment with potential outcomes, risks, benefits, and alternatives discussed to the best of my ability      Use zyrtec daily.  Monitor for potential triggers  Pt verbalizes understanding and agreement with plan.  Follow up for worsening symptoms    Final diagnoses:   Rash and nonspecific skin eruption       5/11/2017   [facility]     Gerda Lee NP  05/12/17 9517

## 2017-05-12 NOTE — DISCHARGE INSTRUCTIONS
Self-Care for Skin Rashes  When your skin reacts to a substance your body is sensitive to, it can cause a rash. You can treat most rashes at home by keeping the skin clean and dry. Many rashes may get better on their own within 2 to 3 days. You may need medical attention if your rash itches, drains, or hurts, particularly if the rash is getting worse.  What can cause a skin rash?    Sun poisoning, caused by too much exposure to the sun    An irritant or allergic reaction to a certain type of food, plant, or chemical, such as  shellfish, poison ivy, and or cleaning products    An infection caused by a fungus (ringworm), virus (chickenpox), or bacteria (strep)    Bites or infestation caused by insects or pests, such as ticks, lice, or mites    Dry skin, which is often seen during the winter months and in older people  How can I control itching and skin damage?    Take soothing  lukewarm baths in a colloidal oatmeal product. You can buy this at the [employer].    Do your best not to scratch. Clip fingernails short, especially in young children, to reduce skin damage if scratching does occur.    Use moisturizing skin lotion instead of scratching your dry skin.    Use sunscreen whenever going out into direct sun.    Use only mild cleansing agents whenever possible.    Wash with mild, nonirritating soap and warm water.    Wear clothing that breathes, such as cotton shirts or canvas shoes.    If fluid is seeping from the rash, cover it loosely with clean gauze to absorb the discharge.    Many rashes are contagious. Prevent the rash from spreading to others by washing your hands often before or after touching others with any skin rash.  Use medicine    Antihistamines such as diphenhydramine can help control itching. But use with caution because they can make you drowsy.    Using over-the-counter hydrocortisone cream on small rashes may help reduce swelling and itching    Most over-the-counter antifungal medicines can treat  athlete s foot and many other fungal infections of the skin.  Check with your healthcare provider  Call your healthcare provider if:    You were told that you have a fungal infection on your skin to make sure you have the correct type of medicine    You have questions or concerns about medicines or their side effects.      Call 911  Call 911 if either of these occur:    Your tongue or lips start to swell    You have difficulty breathing      Call your healthcare provider  Call your healthcare provider if any of these occur:    Temperature  of more than 101.0 F (38.3 C), or as directed    Sore throat, a cough, or unusual fatigue    Red, oozy, or painful rash gets worse. These are signs of infection.    Rash covers your face, genitals, or most of your body    Crusty sores or red rings that begin to spread    You were exposed to someone who has a contagious rash, such as scabies or lice.    Red bull s-eye rash with a white center (a sign of Lyme disease)    You were told that you have resistant bacteria (MRSA) on your skin.     8505-1749 [employer]. [address] [place] [address], [place] [address]. All rights reserved. This information is not intended as a substitute for professional medical care. Always follow your healthcare professional's instructions.

## 2017-06-12 ENCOUNTER — MYC REFILL (OUTPATIENT)
Dept: FAMILY MEDICINE | Facility: OTHER | Age: 27
End: 2017-06-12

## 2017-06-12 ENCOUNTER — TELEPHONE (OUTPATIENT)
Dept: FAMILY MEDICINE | Facility: OTHER | Age: 27
End: 2017-06-12

## 2017-06-12 DIAGNOSIS — E03.9 HYPOTHYROIDISM: Primary | ICD-10-CM

## 2017-06-12 DIAGNOSIS — E03.9 ADULT HYPOTHYROIDISM: Primary | ICD-10-CM

## 2017-06-12 DIAGNOSIS — E03.9 HYPOTHYROIDISM, UNSPECIFIED TYPE: ICD-10-CM

## 2017-06-12 PROCEDURE — 36415 COLL VENOUS BLD VENIPUNCTURE: CPT | Performed by: NURSE PRACTITIONER

## 2017-06-12 PROCEDURE — 84443 ASSAY THYROID STIM HORMONE: CPT | Performed by: NURSE PRACTITIONER

## 2017-06-12 RX ORDER — LEVOTHYROXINE SODIUM 100 UG/1
100 TABLET ORAL DAILY
Qty: 30 TABLET | Refills: 0 | Status: CANCELLED | OUTPATIENT
Start: 2017-06-12

## 2017-06-12 NOTE — TELEPHONE ENCOUNTER
Pt. Needs an order for TSH.  Lab drawn this am.  This is for 2 month fu hypothyroidism.  Ally Win

## 2017-06-13 ENCOUNTER — OFFICE VISIT (OUTPATIENT)
Dept: FAMILY MEDICINE | Facility: OTHER | Age: 27
End: 2017-06-13
Attending: NURSE PRACTITIONER
Payer: COMMERCIAL

## 2017-06-13 VITALS
TEMPERATURE: 98.2 F | HEART RATE: 65 BPM | BODY MASS INDEX: 30.53 KG/M2 | SYSTOLIC BLOOD PRESSURE: 110 MMHG | WEIGHT: 190 LBS | RESPIRATION RATE: 18 BRPM | OXYGEN SATURATION: 99 % | HEIGHT: 66 IN | DIASTOLIC BLOOD PRESSURE: 74 MMHG

## 2017-06-13 DIAGNOSIS — E03.9 HYPOTHYROIDISM, UNSPECIFIED TYPE: Primary | ICD-10-CM

## 2017-06-13 LAB — TSH SERPL DL<=0.05 MIU/L-ACNC: 1 MU/L (ref 0.4–4)

## 2017-06-13 PROCEDURE — 99213 OFFICE O/P EST LOW 20 MIN: CPT | Performed by: NURSE PRACTITIONER

## 2017-06-13 RX ORDER — LEVOTHYROXINE SODIUM 100 UG/1
100 TABLET ORAL DAILY
Qty: 30 TABLET | Refills: 3 | Status: SHIPPED | OUTPATIENT
Start: 2017-06-13 | End: 2017-06-14

## 2017-06-13 ASSESSMENT — PAIN SCALES - GENERAL: PAINLEVEL: NO PAIN (0)

## 2017-06-13 NOTE — PROGRESS NOTES
SUBJECTIVE:                                                    Gale Adams is a 26 year old female who presents to clinic today for the following health issues:      Thyroid      Duration: fu labs    Description (location/character/radiation): fu    Intensity:      Accompanying signs and symptoms: energy has returned    History (similar episodes/previous evaluation):     Precipitating or alleviating factors:     Therapies tried and outcome:      Alopecia  Has seen derm [facility] and will continue to follow up as needed with derm  Hair is starting to return         Problem list and histories reviewed & adjusted, as indicated.  Additional history: as documented    Patient Active Problem List   Diagnosis     Swelling, mass, or lump in head and neck     Left knee injury     ACP (advance care planning)     Hypothyroidism, unspecified type     History reviewed. No pertinent surgical history.    Social History   Substance Use Topics     Smoking status: Never Smoker     Smokeless tobacco: Never Used     Alcohol use No     Family History   Problem Relation Age of Onset     CEREBROVASCULAR DISEASE Maternal Grandmother      CVA     DIABETES Maternal Grandmother      Breast Cancer Maternal Grandmother      Hypertension Father      Hyperlipidemia Mother      Depression Mother      Obesity Mother      Other Cancer Paternal Grandmother      Coronary Artery Disease No family hx of      Colon Cancer No family hx of      Prostate Cancer No family hx of      Anxiety Disorder No family hx of      MENTAL ILLNESS No family hx of      Substance Abuse No family hx of      Anesthesia Reaction No family hx of      Asthma No family hx of      OSTEOPOROSIS No family hx of      Genetic Disorder No family hx of      Thyroid Disease No family hx of          Current Outpatient Prescriptions   Medication Sig Dispense Refill     levothyroxine (SYNTHROID/LEVOTHROID) 100 MCG tablet TAKE ONE TABLET BY MOUTH DAILY 30 tablet 0      "triamcinolone (KENALOG) 0.1 % cream Apply sparingly to affected area three times daily for 14 days. 30 g 0     Allergies   Allergen Reactions     Amoxicillin Rash       Reviewed and updated as needed this visit by clinical staff  Tobacco  Allergies  Meds  Med Hx  Surg Hx  Fam Hx  Soc Hx      Reviewed and updated as needed this visit by Provider         ROS:  C: NEGATIVE for fever, chills, change in weight  INTEGUMENTARY/SKIN: alopecia back of scalp improving  E/M: NEGATIVE for ear, mouth and throat problems  R: NEGATIVE for significant cough or SOB  CV: NEGATIVE for chest pain, palpitations or peripheral edema  ENDOCRINE: Hx thyroid disease    OBJECTIVE:                                                    /74 (BP Location: Left arm, Patient Position: Chair, Cuff Size: Adult Regular)  Pulse 65  Temp 98.2  F (36.8  C) (Tympanic)  Resp 18  Ht 5' 5.5\" (1.664 m)  Wt 190 lb (86.2 kg)  SpO2 99%  BMI 31.14 kg/m2  Body mass index is 31.14 kg/(m^2).   GENERAL: healthy, alert and no distress  NECK: no adenopathy, no asymmetry, masses, or scars and thyroid normal to palpation  RESP: lungs clear to auscultation - no rales, rhonchi or wheezes  CV: regular rate and rhythm, normal S1 S2, no S3 or S4, no murmur, click or rub, no peripheral edema and peripheral pulses strong  SKIN: thickening of hair to the posterior scalp - Derm injections being done at [facility]  PSYCH: mentation appears normal, affect normal/bright  LYMPH: normal ant/post cervical, supraclavicular nodes    Diagnostic Test Results:  Results for orders placed or performed in visit on 06/12/17   TSH   Result Value Ref Range    TSH 1.00 0.40 - 4.00 mU/L        ASSESSMENT:                                                    HYPOTHYROIDISM, controlled/euthyroid                                   PLAN:                                                    ASSESSMENT / PLAN:  (E03.9) Hypothyroidism, unspecified type  (primary encounter " diagnosis)  Comment:   Plan:  levothyroxine (SYNTHROID/LEVOTHROID) 100 MCG tablet,    TSH - future order for 2 months              Lab draw in 2 months if normal follow up in 6 months        DAMON Soler [facility]

## 2017-06-13 NOTE — MR AVS SNAPSHOT
After Visit Summary   6/13/2017    Gale Adams    MRN: [id number]           Patient Information     Date Of Birth          1990        Visit Information        Provider Department      6/13/2017 11:20 AM Danii Landa APRN [facility] [place]        Today's Diagnoses     Hypothyroidism, unspecified type    -  1      Care Instructions      Hypothyroidism       You have hypothyroidism. This means your thyroid gland is not making enough thyroid hormone. This hormone is vital to body growth and metabolism. If you don t make enough, many body processes slow down. This can cause symptoms throughout the body. Hypothyroidism can range from mild to severe. The most severe form is called myxedema.  There are a number of causes of hypothyroidism. A common cause is Hashimoto s disease. This disease causes the body s own immune system to attack the thyroid gland. When you have certain treatments, such as surgery to remove the thyroid gland, this can also cause hypothyroidism.  Symptoms of hypothyroidism can include:    Fatigue    Trouble concentrating or thinking clearly; forgetfulness    Dry skin    Hair loss    Weight gain    Low tolerance to cold    Constipation    Depression    Personality changes    Tingling or prickling of the hands or feet    Heavy, absent, or irregular periods (women only)  Older adults may sometimes have other symptoms. These can include:    Muscle aches and weakness    Confusion    Incontinence (unable to control urine or stool)    Trouble moving around    Falling  Treatment for hypothyroidism involves taking thyroid hormone pills daily. These pills replace the hormone your thyroid doesn t make. You will likely need to take a daily pill for the rest of your life. Tips for taking this medicine are given below.  Home care  Tips for taking your medicine    Take your thyroid hormone pills as prescribed by your healthcare provider. This is most often 1 pill a day  on an empty stomach. Use a pillbox labeled with the days of the week. This will help you remember to take your pill each day.    Don t take products that contain iron and calcium or antacids within 4 hours of taking your thyroid hormone pills.    Don t take other medicines with your thyroid hormone pill without checking with your provider first.    Tell your provider if you have any side effects from your medicines that bother you.    Never change the dosage or stop taking your thyroid pills without talking to your provider first.  General care    Always talk with your provider before trying other medicines or treatments for your thyroid problem.    If you see other healthcare providers, be sure to let them know about your thyroid problem.  Follow-up care  See your healthcare provider for checkups as advised. You may need regular tests to check the level of thyroid hormone in your blood.  When to seek medical advice  Call your healthcare provider right away if any of these occur:    New symptoms develop    Symptoms return, continue, or worsen even after treatment    Extreme fatigue    Puffy hands, face, or feet    Fast or irregular heartbeat    Confusion  Call 911  Call 911 right away if any of these occur:    Fainting    Chest pain    Shortness of breath or trouble breathing    3069-6533 [employer]. [address] [place] [address]. All rights reserved. This information is not intended as a substitute for professional medical care. Always follow your healthcare professional's instructions.                Follow-ups after your visit        Follow-up notes from your care team     Return in about 2 months (around 8/13/2017).      Who to contact     If you have questions or need follow up information about today's clinic visit or your schedule please contact [facility] directly at [phone].  Normal or non-critical lab and imaging results will be communicated to you by [employer]  "letter or phone within 4 business days after the clinic has received the results. If you do not hear from us within 7 days, please contact the clinic through [employer] or phone. If you have a critical or abnormal lab result, we will notify you by phone as soon as possible.  Submit refill requests through [employer] or call your pharmacy and they will forward the refill request to us. Please allow 3 business days for your refill to be completed.          Additional Information About Your Visit        [employer] Information     [employer] gives you secure access to your electronic health record. If you see a primary care provider, you can also send messages to your care team and make appointments. If you have questions, please call your primary care clinic.  If you do not have a primary care provider, please call [phone] and they will assist you.        Care EveryWhere ID     This is your Care EveryWhere ID. This could be used by other organizations to access your [facility] medical records  [id number]        Your Vitals Were     Pulse Temperature Respirations Height Pulse Oximetry BMI (Body Mass Index)    65 98.2  F (36.8  C) (Tympanic) 18 5' 5.5\" (1.664 m) 99% 31.14 kg/m2       Blood Pressure from Last 3 Encounters:   06/13/17 110/74   05/11/17 149/90   04/18/17 108/60    Weight from Last 3 Encounters:   06/13/17 190 lb (86.2 kg)   04/18/17 190 lb (86.2 kg)   03/15/17 192 lb (87.1 kg)              Today, you had the following     No orders found for display         Today's Medication Changes          These changes are accurate as of: 6/13/17 11:53 AM.  If you have any questions, ask your nurse or doctor.               These medicines have changed or have updated prescriptions.        Dose/Directions    levothyroxine 100 MCG tablet   Commonly known as:  SYNTHROID/LEVOTHROID   This may have changed:  See the new instructions.   Used for:  Hypothyroidism, unspecified type   Changed by:  Danii Landa APRN CNP     "    Dose:  100 mcg   Take 1 tablet (100 mcg) by mouth daily   Quantity:  30 tablet   Refills:  3            Where to get your medicines      These medications were sent to [facility] [place] - [address]  [address] [place] [address]     Phone:  [phone]     levothyroxine 100 MCG tablet                Primary Care Provider Office Phone # Fax #    DAMON Aden [facility] [phone]       [facility] [address]  [place] [address]        Thank you!     Thank you for choosing [facility]  for your care. Our goal is always to provide you with excellent care. Hearing back from our patients is one way we can continue to improve our services. Please take a few minutes to complete the written survey that you may receive in the mail after your visit with us. Thank you!             Your Updated Medication List - Protect others around you: Learn how to safely use, store and throw away your medicines at www.disposemymeds.org.          This list is accurate as of: 6/13/17 11:53 AM.  Always use your most recent med list.                   Brand Name Dispense Instructions for use    levothyroxine 100 MCG tablet    SYNTHROID/LEVOTHROID    30 tablet    Take 1 tablet (100 mcg) by mouth daily       triamcinolone 0.1 % cream    KENALOG    30 g    Apply sparingly to affected area three times daily for 14 days.

## 2017-06-13 NOTE — PATIENT INSTRUCTIONS
Hypothyroidism       You have hypothyroidism. This means your thyroid gland is not making enough thyroid hormone. This hormone is vital to body growth and metabolism. If you don t make enough, many body processes slow down. This can cause symptoms throughout the body. Hypothyroidism can range from mild to severe. The most severe form is called myxedema.  There are a number of causes of hypothyroidism. A common cause is Hashimoto s disease. This disease causes the body s own immune system to attack the thyroid gland. When you have certain treatments, such as surgery to remove the thyroid gland, this can also cause hypothyroidism.  Symptoms of hypothyroidism can include:    Fatigue    Trouble concentrating or thinking clearly; forgetfulness    Dry skin    Hair loss    Weight gain    Low tolerance to cold    Constipation    Depression    Personality changes    Tingling or prickling of the hands or feet    Heavy, absent, or irregular periods (women only)  Older adults may sometimes have other symptoms. These can include:    Muscle aches and weakness    Confusion    Incontinence (unable to control urine or stool)    Trouble moving around    Falling  Treatment for hypothyroidism involves taking thyroid hormone pills daily. These pills replace the hormone your thyroid doesn t make. You will likely need to take a daily pill for the rest of your life. Tips for taking this medicine are given below.  Home care  Tips for taking your medicine    Take your thyroid hormone pills as prescribed by your healthcare provider. This is most often 1 pill a day on an empty stomach. Use a pillbox labeled with the days of the week. This will help you remember to take your pill each day.    Don t take products that contain iron and calcium or antacids within 4 hours of taking your thyroid hormone pills.    Don t take other medicines with your thyroid hormone pill without checking with your provider first.    Tell your provider if you have  any side effects from your medicines that bother you.    Never change the dosage or stop taking your thyroid pills without talking to your provider first.  General care    Always talk with your provider before trying other medicines or treatments for your thyroid problem.    If you see other healthcare providers, be sure to let them know about your thyroid problem.  Follow-up care  See your healthcare provider for checkups as advised. You may need regular tests to check the level of thyroid hormone in your blood.  When to seek medical advice  Call your healthcare provider right away if any of these occur:    New symptoms develop    Symptoms return, continue, or worsen even after treatment    Extreme fatigue    Puffy hands, face, or feet    Fast or irregular heartbeat    Confusion  Call 911  Call 911 right away if any of these occur:    Fainting    Chest pain    Shortness of breath or trouble breathing    6292-5793 [employer]. [address] [place] [address], [place] [address]. All rights reserved. This information is not intended as a substitute for professional medical care. Always follow your healthcare professional's instructions.

## 2017-06-13 NOTE — NURSING NOTE
"Chief Complaint   Patient presents with     Thyroid Disease     fu labs       Initial /74 (BP Location: Left arm, Patient Position: Chair, Cuff Size: Adult Regular)  Pulse 65  Temp 98.2  F (36.8  C) (Tympanic)  Resp 18  Ht 5' 5.5\" (1.664 m)  Wt 190 lb (86.2 kg)  SpO2 99%  BMI 31.14 kg/m2 Estimated body mass index is 31.14 kg/(m^2) as calculated from the following:    Height as of this encounter: 5' 5.5\" (1.664 m).    Weight as of this encounter: 190 lb (86.2 kg).  Medication Reconciliation: complete   Ally Win      "

## 2017-06-14 DIAGNOSIS — E03.9 HYPOTHYROIDISM, UNSPECIFIED TYPE: ICD-10-CM

## 2017-06-14 RX ORDER — LEVOTHYROXINE SODIUM 100 UG/1
100 TABLET ORAL DAILY
Qty: 30 TABLET | Refills: 3 | Status: SHIPPED | OUTPATIENT
Start: 2017-06-14 | End: 2017-12-09

## 2017-06-14 NOTE — TELEPHONE ENCOUNTER
Message from MyChart:  Original authorizing provider: DAMON Soler CNP would like a refill of the following medications:  levothyroxine (SYNTHROID/LEVOTHROID) 100 MCG tablet [DAMON Soler CNP]    Preferred pharmacy: [facility] [place] [facility] [address]    Comment:

## 2017-07-03 ENCOUNTER — OFFICE VISIT (OUTPATIENT)
Dept: FAMILY MEDICINE | Facility: OTHER | Age: 27
End: 2017-07-03
Attending: NURSE PRACTITIONER
Payer: COMMERCIAL

## 2017-07-03 VITALS
DIASTOLIC BLOOD PRESSURE: 70 MMHG | RESPIRATION RATE: 16 BRPM | WEIGHT: 190 LBS | OXYGEN SATURATION: 98 % | SYSTOLIC BLOOD PRESSURE: 110 MMHG | HEART RATE: 74 BPM | BODY MASS INDEX: 30.53 KG/M2 | HEIGHT: 66 IN | TEMPERATURE: 98.3 F

## 2017-07-03 DIAGNOSIS — N91.2 AMENORRHEA: Primary | ICD-10-CM

## 2017-07-03 LAB — HCG UR QL: NEGATIVE

## 2017-07-03 PROCEDURE — 99213 OFFICE O/P EST LOW 20 MIN: CPT | Performed by: NURSE PRACTITIONER

## 2017-07-03 PROCEDURE — 81025 URINE PREGNANCY TEST: CPT | Performed by: NURSE PRACTITIONER

## 2017-07-03 ASSESSMENT — PAIN SCALES - GENERAL: PAINLEVEL: NO PAIN (0)

## 2017-07-03 NOTE — PROGRESS NOTES
SUBJECTIVE:                                                    Gale Adams is a 26 year old female who presents to clinic today for the following health issues:    Gale presents today with amenorrhea for 2 weeks. She is nauseated and has had 2 positive home pregnancy test. LMP was May 14. Gale is trying to get pregnant.      Amenorrhea      Duration: 2 weeks late, nausea    Description (location/character/radiation): nausea    Intensity:  mild    Accompanying signs and symptoms: mild nausea    History (similar episodes/previous evaluation): None    Precipitating or alleviating factors: None    Therapies tried and outcome: None           Problem list and histories reviewed & adjusted, as indicated.  Additional history: as documented    Patient Active Problem List   Diagnosis     Swelling, mass, or lump in head and neck     Left knee injury     ACP (advance care planning)     Hypothyroidism, unspecified type     History reviewed. No pertinent surgical history.    Social History   Substance Use Topics     Smoking status: Never Smoker     Smokeless tobacco: Never Used     Alcohol use No     Family History   Problem Relation Age of Onset     CEREBROVASCULAR DISEASE Maternal Grandmother      CVA     DIABETES Maternal Grandmother      Breast Cancer Maternal Grandmother      Hypertension Father      Hyperlipidemia Mother      Depression Mother      Obesity Mother      Other Cancer Paternal Grandmother      Coronary Artery Disease No family hx of      Colon Cancer No family hx of      Prostate Cancer No family hx of      Anxiety Disorder No family hx of      MENTAL ILLNESS No family hx of      Substance Abuse No family hx of      Anesthesia Reaction No family hx of      Asthma No family hx of      OSTEOPOROSIS No family hx of      Genetic Disorder No family hx of      Thyroid Disease No family hx of          Current Outpatient Prescriptions   Medication Sig Dispense Refill     levothyroxine  "(SYNTHROID/LEVOTHROID) 100 MCG tablet Take 1 tablet (100 mcg) by mouth daily 30 tablet 3     Allergies   Allergen Reactions     Amoxicillin Rash       Reviewed and updated as needed this visit by clinical staff  Tobacco  Allergies  Meds  Med Hx  Surg Hx  Fam Hx  Soc Hx      Reviewed and updated as needed this visit by Provider         ROS:  C: NEGATIVE for fever, chills, change in weight  INTEGUMENTARY/SKIN: NEGATIVE for worrisome rashes, moles or lesions  E/M: NEGATIVE for ear, mouth and throat problems  R: NEGATIVE for significant cough or SOB  CV: NEGATIVE for chest pain, palpitations or peripheral edema  GI: nauseated  : LMP may 14, 2017 - positive for nausea and some light cramping feeling  ENDOCRINE: NEGATIVE for temperature intolerance, skin/hair changes and Hx thyroid disease    OBJECTIVE:     /70 (BP Location: Left arm, Patient Position: Sitting, Cuff Size: Adult Regular)  Pulse 74  Temp 98.3  F (36.8  C) (Tympanic)  Resp 16  Ht 5' 6\" (1.676 m)  Wt 190 lb (86.2 kg)  SpO2 98%  BMI 30.67 kg/m2  Body mass index is 30.67 kg/(m^2).   GENERAL: healthy, alert and no distress  RESP: lungs clear to auscultation - no rales, rhonchi or wheezes  CV: regular rate and rhythm, normal S1 S2, no S3 or S4, no murmur, click or rub, no peripheral edema and peripheral pulses strong  ABDOMEN: soft, nontender, no hepatosplenomegaly, no masses and bowel sounds normal  PSYCH: mentation appears normal, affect normal/bright    Diagnostic Test Results:  Results for orders placed or performed in visit on 07/03/17 (from the past 24 hour(s))   HCG qualitative urine   Result Value Ref Range    HCG Qual Urine Negative NEG       ASSESSMENT:       PLAN:   ASSESSMENT / PLAN:  (N91.2) Amenorrhea  (primary encounter diagnosis)  Comment:   Plan:  HCG qualitative urine - negative   HCG qualitative urine - future order - repeat if remains amenorrhea next week   Take prenatal vitamin               Follow up with any questions " or concerns          Danii Landa, DAM[facility]

## 2017-07-03 NOTE — MR AVS SNAPSHOT
After Visit Summary   7/3/2017    Gale Adams    MRN: [id number]           Patient Information     Date Of Birth          1990        Visit Information        Provider Department      7/3/2017 8:00 AM Danii Landa APRN CNP [facility]        Today's Diagnoses     Amenorrhea    -  1      Care Instructions    Future order for urine HCG  Take prenatal vitamins          Follow-ups after your visit        Follow-up notes from your care team     Return if symptoms worsen or fail to improve.      Future tests that were ordered for you today     Open Future Orders        Priority Expected Expires Ordered    HCG qualitative urine Routine 7/10/2017 7/3/2018 7/3/2017            Who to contact     If you have questions or need follow up information about today's clinic visit or your schedule please contact [facility] directly at [phone].  Normal or non-critical lab and imaging results will be communicated to you by [employer], letter or phone within 4 business days after the clinic has received the results. If you do not hear from us within 7 days, please contact the clinic through [employer] or phone. If you have a critical or abnormal lab result, we will notify you by phone as soon as possible.  Submit refill requests through [employer] or call your pharmacy and they will forward the refill request to us. Please allow 3 business days for your refill to be completed.          Additional Information About Your Visit        MyChart Information     [employer] gives you secure access to your electronic health record. If you see a primary care provider, you can also send messages to your care team and make appointments. If you have questions, please call your primary care clinic.  If you do not have a primary care provider, please call [phone] and they will assist you.        Care EveryWhere ID     This is your Care EveryWhere ID. This could be used by other organizations  "to access your [place] medical records  [id number]        Your Vitals Were     Pulse Temperature Respirations Height Pulse Oximetry BMI (Body Mass Index)    74 98.3  F (36.8  C) (Tympanic) 16 5' 6\" (1.676 m) 98% 30.67 kg/m2       Blood Pressure from Last 3 Encounters:   07/03/17 110/70   06/13/17 110/74   05/11/17 149/90    Weight from Last 3 Encounters:   07/03/17 190 lb (86.2 kg)   06/13/17 190 lb (86.2 kg)   04/18/17 190 lb (86.2 kg)              We Performed the Following     HCG qualitative urine        Primary Care Provider Office Phone # Fax #    Danii WadeDAMON [facility] [phone]       [place] [address]  [facility] [address]        Equal Access to Services     [facility] : Hadii aad ku hadasho Soomaali, waaxda luqadaha, qaybta kaalmada adeegyada, waxay franin hayaajaye leyva . So [facility] [phone].    ATENCIÓN: Si habla español, tiene a hewitt disposición servicios gratuitos de asistencia lingüística. Nuno al [phone].    We comply with applicable federal civil rights laws and [place] laws. We do not discriminate on the basis of race, color, national origin, age, disability sex, sexual orientation or gender identity.            Thank you!     Thank you for choosing [facility]  for your care. Our goal is always to provide you with excellent care. Hearing back from our patients is one way we can continue to improve our services. Please take a few minutes to complete the written survey that you may receive in the mail after your visit with us. Thank you!             Your Updated Medication List - Protect others around you: Learn how to safely use, store and throw away your medicines at www.disposemymeds.org.          This list is accurate as of: 7/3/17  8:33 AM.  Always use your most recent med list.                   Brand Name Dispense Instructions for use Diagnosis    levothyroxine 100 MCG tablet    SYNTHROID/LEVOTHROID    30 tablet    " Take 1 tablet (100 mcg) by mouth daily    Hypothyroidism, unspecified type

## 2017-07-03 NOTE — NURSING NOTE
"Chief Complaint   Patient presents with     Confirmation Of Pregnancy       Initial /70 (BP Location: Left arm, Patient Position: Sitting, Cuff Size: Adult Regular)  Pulse 74  Temp 98.3  F (36.8  C) (Tympanic)  Resp 16  Ht 5' 6\" (1.676 m)  Wt 190 lb (86.2 kg)  SpO2 98%  BMI 30.67 kg/m2 Estimated body mass index is 30.67 kg/(m^2) as calculated from the following:    Height as of this encounter: 5' 6\" (1.676 m).    Weight as of this encounter: 190 lb (86.2 kg).  Medication Reconciliation: complete   Ally Win      "

## 2017-08-28 DIAGNOSIS — N91.2 AMENORRHEA: ICD-10-CM

## 2017-08-28 DIAGNOSIS — E03.9 HYPOTHYROIDISM, UNSPECIFIED TYPE: ICD-10-CM

## 2017-08-28 DIAGNOSIS — N91.2 AMENORRHEA: Primary | ICD-10-CM

## 2017-08-28 LAB
HCG UR QL: NEGATIVE
TSH SERPL DL<=0.005 MIU/L-ACNC: 0.64 MU/L (ref 0.4–4)

## 2017-08-28 PROCEDURE — 81025 URINE PREGNANCY TEST: CPT | Performed by: NURSE PRACTITIONER

## 2017-08-28 PROCEDURE — 84443 ASSAY THYROID STIM HORMONE: CPT | Performed by: NURSE PRACTITIONER

## 2017-08-28 PROCEDURE — 36415 COLL VENOUS BLD VENIPUNCTURE: CPT | Performed by: NURSE PRACTITIONER

## 2017-08-28 NOTE — PROGRESS NOTES
Gale and her  are trying to get pregnant and she has missed a couple periods. TSH in normal range. Would like to talk with OB/GYN about getting pregnant.  Referral made.

## 2017-09-05 ENCOUNTER — OFFICE VISIT (OUTPATIENT)
Dept: OBGYN | Facility: OTHER | Age: 27
End: 2017-09-05
Attending: NURSE PRACTITIONER
Payer: COMMERCIAL

## 2017-09-05 VITALS
BODY MASS INDEX: 30.53 KG/M2 | WEIGHT: 190 LBS | HEART RATE: 69 BPM | DIASTOLIC BLOOD PRESSURE: 68 MMHG | HEIGHT: 66 IN | SYSTOLIC BLOOD PRESSURE: 114 MMHG | OXYGEN SATURATION: 98 %

## 2017-09-05 DIAGNOSIS — N91.2 AMENORRHEA: ICD-10-CM

## 2017-09-05 PROCEDURE — 99213 OFFICE O/P EST LOW 20 MIN: CPT | Performed by: NURSE PRACTITIONER

## 2017-09-05 RX ORDER — MEDROXYPROGESTERONE ACETATE 10 MG
10 TABLET ORAL DAILY
Qty: 10 TABLET | Refills: 0 | Status: SHIPPED | OUTPATIENT
Start: 2017-09-05 | End: 2017-09-15

## 2017-09-05 ASSESSMENT — PAIN SCALES - GENERAL: PAINLEVEL: NO PAIN (0)

## 2017-09-05 NOTE — MR AVS SNAPSHOT
After Visit Summary   9/5/2017    Gale Adams    MRN: [id number]           Patient Information     Date Of Birth          1990        Visit Information        Provider Department      9/5/2017 9:00 AM Beverly Malik NP [facility]        Today's Diagnoses     Amenorrhea          Care Instructions      Amenorrhea     Normally, the uterine lining builds up and is shed each month during a woman s period. With amenorrhea, this process does not happen.   Menstruation occurs when a woman sheds the lining of her uterus (womb). It is also called a  period.  For most women, this happens once a month. But some women may not get their periods. This is called amenorrhea.  Amenorrhea may be due to a number of causes. These include:    Pregnancy, breastfeeding, or menopause    Hormone problems    Emotional stress    Being at too low body weight    Exercising too much    Poor nutrition or eating disorders    Taking certain medicines    Having certain birth defects or genetic disorders  There are 2 types of amenorrhea:    Primary amenorrhea is when a girl has not had her first period by age 15.    Secondary amenorrhea is when:    A girl or woman who has been having normal periods stops getting them for 3 months in a row    A girl or woman who has been having irregular periods stops getting them for 6 months in a row  One or more tests can be done to find out why you re not having periods. These include blood tests and imaging tests. Once the cause is found, it can be treated. Treatments can range from lifestyle and diet changes to medicines, procedures, or surgery. Your healthcare provider will discuss options with you as needed.  Follow-up care  Follow up with your healthcare provider as advised. You'll be told the results of any tests as soon as they are ready.   Note: Know that you can still become pregnant even if you are not having regular periods. It is important to use some form of birth  control if you are sexually active and do not wish to become pregnant.   When to seek medical advice  Call your healthcare provider right way if any of these occur:    Severe pain in the abdomen (belly)    Swelling of the belly    Sudden, severe vaginal bleeding    Feeling faint or passing out  Date Last Reviewed: 6/11/2015 2000-2017 [employer]. [address] [place] [address]. All rights reserved. This information is not intended as a substitute for professional medical care. Always follow your healthcare professional's instructions.                Follow-ups after your visit        Your next 10 appointments already scheduled     Sep 27, 2017  1:00 PM CDT   Radiology with [facility] ULTRASOUND 2   [facility] ([facility] )    [address] [place] [id number] [phone]            Oct 12, 2017  9:00 AM CDT   (Arrive by 8:45 AM)   Office Visit with Lam Bravo MD   [facility] ([facility] )    [address] [place] [address] [id number] [phone]           Bring a current list of meds and any records pertaining to this visit.  For Physicals, please bring immunization records and any forms needing to be filled out.  Please arrive 15 minutes early to complete paperwork and register              Who to contact     If you have questions or need follow up information about today's clinic visit or your schedule please contact [facility] directly at [phone].  Normal or non-critical lab and imaging results will be communicated to you by MyChart, letter or phone within 4 business days after the clinic has received the results. If you do not hear from us within 7 days, please contact the clinic through MyChart or phone. If you have a critical or abnormal lab result, we will notify you by phone as soon as possible.  Submit refill requests through [employer] or call your pharmacy and they will forward the refill request to us. Please  "allow 3 business days for your refill to be completed.          Additional Information About Your Visit        MyChart Information     [employer] gives you secure access to your electronic health record. If you see a primary care provider, you can also send messages to your care team and make appointments. If you have questions, please call your primary care clinic.  If you do not have a primary care provider, please call [phone] and they will assist you.        Care EveryWhere ID     This is your Care EveryWhere ID. This could be used by other organizations to access your [place] medical records  [id number]        Your Vitals Were     Pulse Height Last Period Pulse Oximetry BMI (Body Mass Index)       69 5' 6\" (1.676 m) 06/15/2017 98% 30.67 kg/m2        Blood Pressure from Last 3 Encounters:   09/05/17 114/68   07/03/17 110/70   06/13/17 110/74    Weight from Last 3 Encounters:   09/05/17 190 lb (86.2 kg)   07/03/17 190 lb (86.2 kg)   06/13/17 190 lb (86.2 kg)                 Today's Medication Changes          These changes are accurate as of: 9/5/17 11:59 PM.  If you have any questions, ask your nurse or doctor.               Start taking these medicines.        Dose/Directions    medroxyPROGESTERone 10 MG tablet   Commonly known as:  PROVERA   Used for:  Amenorrhea   Started by:  Beverly Malik NP        Dose:  10 mg   Take 1 tablet (10 mg) by mouth daily for 10 days   Quantity:  10 tablet   Refills:  0            Where to get your medicines      These medications were sent to [facility] [address]  [facility] [place] [address]     Phone:  [phone]     medroxyPROGESTERone 10 MG tablet                Primary Care Provider Office Phone # Fax #    DAMON Aden [phone] [address] [address]        Equal Access to Services     [facility] AH: Hadii aad mook hadasho Soomaali, waaxda luqadaha, qaybta kaalmada " ramin salgueromarinanaveed la'aan ah. Sherley [facility] [phone].    ATENCIÓN: Si habla amos, tiene a hewitt disposición servicios gratuitos de asistencia lingüística. Nuno al [phone].    We comply with applicable federal civil rights laws and [place] laws. We do not discriminate on the basis of race, color, national origin, age, disability sex, sexual orientation or gender identity.            Thank you!     [facility] you for [facility]  [facility] your [facility]. Our goal is always to provide you with excellent care. Hearing back from our patients is one way we can continue to improve our services. Please take a few minutes to complete the written survey that you may receive in the mail after your visit with us. Thank you!             Your Updated Medication List - Protect others around you: Learn how to safely use, store and throw away your medicines at www.disposemymeds.org.          This list is accurate as of: 9/5/17 11:59 PM.  Always use your most recent med list.                   Brand Name Dispense Instructions for use Diagnosis    levothyroxine 100 MCG tablet    SYNTHROID/LEVOTHROID    30 tablet    Take 1 tablet (100 mcg) by mouth daily    Hypothyroidism, unspecified type       medroxyPROGESTERone 10 MG tablet    PROVERA    10 tablet    Take 1 tablet (10 mg) by mouth daily for 10 days    Amenorrhea

## 2017-09-05 NOTE — NURSING NOTE
"Chief Complaint   Patient presents with     Amenorrhea       Initial /68  Pulse 69  Ht 5' 6\" (1.676 m)  Wt 190 lb (86.2 kg)  LMP 06/15/2017  SpO2 98%  BMI 30.67 kg/m2 Estimated body mass index is 30.67 kg/(m^2) as calculated from the following:    Height as of this encounter: 5' 6\" (1.676 m).    Weight as of this encounter: 190 lb (86.2 kg).  Medication Reconciliation: autumn Wright      "

## 2017-09-08 NOTE — PROGRESS NOTES
"[facility]                HPI   Pt was seen today at the request of her PCP for amenorrhea.  She was diagnosed 1 year ago with hypothyroid, has been compliant with management.  G0.  She has been off of contraception for 2 years without conceiving.  LMP 6/15/2017.  Periods are normally very irregular.  Denies hx of STI, abnormal pap, or gyn surgery.               Medications:     Current Outpatient Prescriptions Ordered in Epic   Medication     medroxyPROGESTERone (PROVERA) 10 MG tablet     levothyroxine (SYNTHROID/LEVOTHROID) 100 MCG tablet     No current Epic-ordered facility-administered medications on file.                 Allergies:   Amoxicillin         Review of Systems:   The 5 point Review of Systems is negative other than noted in the HPI                     Physical Exam:   Blood pressure 114/68, pulse 69, height 5' 6\" (1.676 m), weight 190 lb (86.2 kg), last menstrual period 06/15/2017, SpO2 98 %, not currently breastfeeding.  Constitutional:   awake, alert, cooperative, no apparent distress, and appears stated age     Gyn:       Normal female genitalia with no vaginal discharge, lesions.  Mucosa pink.  Cervix midline without CMT.  Uterus normal in size, non tender.  No adnexal tenderness.            Assessment and Plan:   Amenorrhea - Hcg and TSH up to date.  Pelvic US ordered.  Plan Provera to initiate menses.  schedule with / [place] for fertility evaluation.      LEEANNA Morton  9/8/2017  9:30 AM  "

## 2017-09-08 NOTE — PATIENT INSTRUCTIONS
Amenorrhea     Normally, the uterine lining builds up and is shed each month during a woman s period. With amenorrhea, this process does not happen.   Menstruation occurs when a woman sheds the lining of her uterus (womb). It is also called a  period.  For most women, this happens once a month. But some women may not get their periods. This is called amenorrhea.  Amenorrhea may be due to a number of causes. These include:    Pregnancy, breastfeeding, or menopause    Hormone problems    Emotional stress    Being at too low body weight    Exercising too much    Poor nutrition or eating disorders    Taking certain medicines    Having certain birth defects or genetic disorders  There are 2 types of amenorrhea:    Primary amenorrhea is when a girl has not had her first period by age 15.    Secondary amenorrhea is when:    A girl or woman who has been having normal periods stops getting them for 3 months in a row    A girl or woman who has been having irregular periods stops getting them for 6 months in a row  One or more tests can be done to find out why you re not having periods. These include blood tests and imaging tests. Once the cause is found, it can be treated. Treatments can range from lifestyle and diet changes to medicines, procedures, or surgery. Your healthcare provider will discuss options with you as needed.  Follow-up care  Follow up with your healthcare provider as advised. You'll be told the results of any tests as soon as they are ready.   Note: Know that you can still become pregnant even if you are not having regular periods. It is important to use some form of birth control if you are sexually active and do not wish to become pregnant.   When to seek medical advice  Call your healthcare provider right way if any of these occur:    Severe pain in the abdomen (belly)    Swelling of the belly    Sudden, severe vaginal bleeding    Feeling faint or passing out  Date Last Reviewed: 6/11/2015 2000-2017  [employer]. [address] [place] [address], [place] [address]. All rights reserved. This information is not intended as a substitute for professional medical care. Always follow your healthcare professional's instructions.

## 2017-09-18 ENCOUNTER — OFFICE VISIT (OUTPATIENT)
Dept: FAMILY MEDICINE | Facility: OTHER | Age: 27
End: 2017-09-18
Attending: NURSE PRACTITIONER
Payer: COMMERCIAL

## 2017-09-18 VITALS
BODY MASS INDEX: 30.53 KG/M2 | HEIGHT: 66 IN | WEIGHT: 190 LBS | RESPIRATION RATE: 18 BRPM | HEART RATE: 92 BPM | OXYGEN SATURATION: 99 % | TEMPERATURE: 100.3 F | SYSTOLIC BLOOD PRESSURE: 104 MMHG | DIASTOLIC BLOOD PRESSURE: 72 MMHG

## 2017-09-18 DIAGNOSIS — J01.00 ACUTE MAXILLARY SINUSITIS, RECURRENCE NOT SPECIFIED: Primary | ICD-10-CM

## 2017-09-18 DIAGNOSIS — R07.0 THROAT PAIN: ICD-10-CM

## 2017-09-18 LAB
DEPRECATED S PYO AG THROAT QL EIA: NORMAL
SPECIMEN SOURCE: NORMAL

## 2017-09-18 PROCEDURE — 87880 STREP A ASSAY W/OPTIC: CPT | Performed by: NURSE PRACTITIONER

## 2017-09-18 PROCEDURE — 87081 CULTURE SCREEN ONLY: CPT | Performed by: NURSE PRACTITIONER

## 2017-09-18 PROCEDURE — 99213 OFFICE O/P EST LOW 20 MIN: CPT | Performed by: NURSE PRACTITIONER

## 2017-09-18 RX ORDER — LEVOFLOXACIN 500 MG/1
500 TABLET, FILM COATED ORAL DAILY
Qty: 10 TABLET | Refills: 0 | Status: SHIPPED | OUTPATIENT
Start: 2017-09-18 | End: 2017-11-04

## 2017-09-18 ASSESSMENT — ANXIETY QUESTIONNAIRES
GAD7 TOTAL SCORE: 1
3. WORRYING TOO MUCH ABOUT DIFFERENT THINGS: NOT AT ALL
IF YOU CHECKED OFF ANY PROBLEMS ON THIS QUESTIONNAIRE, HOW DIFFICULT HAVE THESE PROBLEMS MADE IT FOR YOU TO DO YOUR WORK, TAKE CARE OF THINGS AT HOME, OR GET ALONG WITH OTHER PEOPLE: NOT DIFFICULT AT ALL
6. BECOMING EASILY ANNOYED OR IRRITABLE: NOT AT ALL
5. BEING SO RESTLESS THAT IT IS HARD TO SIT STILL: NOT AT ALL
2. NOT BEING ABLE TO STOP OR CONTROL WORRYING: NOT AT ALL
7. FEELING AFRAID AS IF SOMETHING AWFUL MIGHT HAPPEN: NOT AT ALL
1. FEELING NERVOUS, ANXIOUS, OR ON EDGE: NOT AT ALL
4. TROUBLE RELAXING: SEVERAL DAYS

## 2017-09-18 ASSESSMENT — PATIENT HEALTH QUESTIONNAIRE - PHQ9: SUM OF ALL RESPONSES TO PHQ QUESTIONS 1-9: 2

## 2017-09-18 ASSESSMENT — PAIN SCALES - GENERAL: PAINLEVEL: MODERATE PAIN (5)

## 2017-09-18 NOTE — NURSING NOTE
"Chief Complaint   Patient presents with     Pharyngitis       Initial /72 (BP Location: Right arm, Patient Position: Sitting, Cuff Size: Adult Regular)  Pulse 92  Temp 100.3  F (37.9  C) (Tympanic)  Resp 18  Ht 5' 6\" (1.676 m)  Wt 190 lb (86.2 kg)  LMP 06/15/2017  SpO2 99%  BMI 30.67 kg/m2 Estimated body mass index is 30.67 kg/(m^2) as calculated from the following:    Height as of this encounter: 5' 6\" (1.676 m).    Weight as of this encounter: 190 lb (86.2 kg).  Medication Reconciliation: complete   Ally Win      "

## 2017-09-18 NOTE — PATIENT INSTRUCTIONS
Self-Care for Sinusitis     Drinking plenty of water can help sinuses drain.   Sinusitis can often be managed with self-care. Self-care can keep sinuses moist and make you feel more comfortable. Remember to follow your doctor's instructions closely. This can make a big difference in getting your sinus problem under control.  Drink fluids  Drinking extra fluids helps thin your mucus. This lets it drain from your sinuses more easily. Have a glass of water every hour or two. A humidifier helps in much the same way. Fluids can also offset the drying effects of certain medicines. If you use a humidifier, follow the product maker's instructions on how to use it. Clean it on a regular schedule.  Use saltwater rinses  Rinses help keep your sinuses and nose moist. Mix a teaspoon of salt in 8 ounces of fresh, warm water. Use a bulb syringe to gently squirt the water into your nose a few times a day. You can also buy ready-made saline nasal sprays.  Apply hot or cold packs  Applying heat to the area surrounding your sinuses may make you feel more comfortable. Use a hot water bottle or a hand towel dipped in hot water. Some people also find ice packs effective for relieving pain.  Medicines  Your doctor may prescribe medications to help treat your sinusitis. If you have an infection, antibiotics can help clear it up. If you are prescribed antibiotics, take all pills on schedule until they are gone, even if you feel better. Decongestants help relieve swelling. Use decongestant sprays for short periods only under the direction of your doctor. If you have allergies, your doctor may prescribe medications to help relieve them.   Date Last Reviewed: 10/1/2016    6820-6266 [employer]. [address] [place] [address]. All rights reserved. This information is not intended as a substitute for professional medical care. Always follow your healthcare professional's instructions.

## 2017-09-18 NOTE — MR AVS SNAPSHOT
After Visit Summary   9/18/2017    Gale Adams    MRN: [id number]           Patient Information     Date Of Birth          1990        Visit Information        Provider Department      9/18/2017 10:20 AM Danii Landa APRN [facility]        Today's Diagnoses     Acute maxillary sinusitis, recurrence not specified    -  1    Throat pain          Care Instructions      Self-Care for Sinusitis     Drinking plenty of water can help sinuses drain.   Sinusitis can often be managed with self-care. Self-care can keep sinuses moist and make you feel more comfortable. Remember to follow your doctor's instructions closely. This can make a big difference in getting your sinus problem under control.  Drink fluids  Drinking extra fluids helps thin your mucus. This lets it drain from your sinuses more easily. Have a glass of water every hour or two. A humidifier helps in much the same way. Fluids can also offset the drying effects of certain medicines. If you use a humidifier, follow the product maker's instructions on how to use it. Clean it on a regular schedule.  Use saltwater rinses  Rinses help keep your sinuses and nose moist. Mix a teaspoon of salt in 8 ounces of fresh, warm water. Use a bulb syringe to gently squirt the water into your nose a few times a day. You can also buy ready-made saline nasal sprays.  Apply hot or cold packs  Applying heat to the area surrounding your sinuses may make you feel more comfortable. Use a hot water bottle or a hand towel dipped in hot water. Some people also find ice packs effective for relieving pain.  Medicines  Your doctor may prescribe medications to help treat your sinusitis. If you have an infection, antibiotics can help clear it up. If you are prescribed antibiotics, take all pills on schedule until they are gone, even if you feel better. Decongestants help relieve swelling. Use decongestant sprays for short periods only under  the direction of your doctor. If you have allergies, your doctor may prescribe medications to help relieve them.   Date Last Reviewed: 10/1/2016    8318-8506 [employer]. [address] [place] [address], [place] [address]. All rights reserved. This information is not intended as a substitute for professional medical care. Always follow your healthcare professional's instructions.                Follow-ups after your visit        Follow-up notes from your care team     Return if symptoms worsen or fail to improve.      Your next 10 appointments already scheduled     Sep 29, 2017  4:30 PM CDT   Radiology with HI UNTRASOUND 1   [facility] Ultrasound ([facility] )    [address] [place] [address]   [phone]            Oct 12, 2017  9:00 AM CDT   (Arrive by 8:45 AM)   Office Visit with Lam Bravo MD   [facility] ([facility] )    [address] [place] [address] [phone]           Bring a current list of meds and any records pertaining to this visit.  For Physicals, please bring immunization records and any forms needing to be filled out.  Please arrive 15 minutes early to complete paperwork and register              Who to contact     If you have questions or need follow up information about today's clinic visit or your schedule please contact [facility] directly at [phone].  Normal or non-critical lab and imaging results will be communicated to you by MyChart, letter or phone within 4 business days after the clinic has received the results. If you do not hear from us within 7 days, please contact the clinic through MyChart or phone. If you have a critical or abnormal lab result, we will notify you by phone as soon as possible.  Submit refill requests through [employer] or call your pharmacy and they will forward the refill request to us. Please allow 3 business days for your refill to be completed.          Additional Information  "About Your Visit        [employer] Information     [employer] gives you secure access to your electronic health record. If you see a primary care provider, you can also send messages to your care team and make appointments. If you have questions, please call your primary care clinic.  If you do not have a primary care provider, please call [phone] and they will assist you.        Care EveryWhere ID     This is your Care EveryWhere ID. This could be used by other organizations to access your [facility] medical records  [id number]        Your Vitals Were     Pulse Temperature Respirations Height Last Period Pulse Oximetry    92 100.3  F (37.9  C) (Tympanic) 18 5' 6\" (1.676 m) 06/15/2017 99%    BMI (Body Mass Index)                   30.67 kg/m2            Blood Pressure from Last 3 Encounters:   09/18/17 104/72   09/05/17 114/68   07/03/17 110/70    Weight from Last 3 Encounters:   09/18/17 190 lb (86.2 kg)   09/05/17 190 lb (86.2 kg)   07/03/17 190 lb (86.2 kg)              We Performed the Following     Beta strep group A culture     Strep, Rapid Screen          Today's Medication Changes          These changes are accurate as of: 9/18/17  1:34 PM.  If you have any questions, ask your nurse or doctor.               Start taking these medicines.        Dose/Directions    levofloxacin 500 MG tablet   Commonly known as:  LEVAQUIN   Used for:  Acute maxillary sinusitis, recurrence not specified   Started by:  Danii Landa APRN CNP        Dose:  500 mg   Take 1 tablet (500 mg) by mouth daily   Quantity:  10 tablet   Refills:  0            Where to get your medicines      These medications were sent to [facility] [address]  [address] [place] [address]     Phone:  [phone]     levofloxacin 500 MG tablet                Primary Care Provider Office Phone # Fax #    DAMON Aden [phone] [address] [address]   "      Equal Access to Services     [facility] : Hadii aad ku hadrickykayla Haney, walizada lusamjeanneha, qafrancisco jta emersonramonaramin barros. So [facility] [phone].    ATENCIÓN: Si habla español, tiene a hewitt disposición servicios gratuitos de asistencia lingüística. Llame al [phone].    We comply with applicable federal civil rights laws and [place] laws. We do not discriminate on the basis of race, color, national origin, age, disability sex, sexual orientation or gender identity.            Thank you!     Thank you for choosing [facility]  for your care. Our goal is always to provide you with excellent care. Hearing back from our patients is one way we can continue to improve our services. Please take a few minutes to complete the written survey that you may receive in the mail after your visit with us. Thank you!             Your Updated Medication List - Protect others around you: Learn how to safely use, store and throw away your medicines at www.disposemymeds.org.          This list is accurate as of: 9/18/17  1:34 PM.  Always use your most recent med list.                   Brand Name Dispense Instructions for use Diagnosis    levofloxacin 500 MG tablet    LEVAQUIN    10 tablet    Take 1 tablet (500 mg) by mouth daily    Acute maxillary sinusitis, recurrence not specified       levothyroxine 100 MCG tablet    SYNTHROID/LEVOTHROID    30 tablet    Take 1 tablet (100 mcg) by mouth daily    Hypothyroidism, unspecified type

## 2017-09-18 NOTE — PROGRESS NOTES
SUBJECTIVE:   Gale Adams is a 27 year old female who presents to clinic today for the following health issues:      RESPIRATORY SYMPTOMS      Duration: over 5 days ago    Description  nasal congestion, sore throat, facial pain/pressure, cough, fever and fatigue/malaise    Severity: moderate    Accompanying signs and symptoms: body sore from fever and coughing    History (predisposing factors):  none    Precipitating or alleviating factors: None    Therapies tried and outcome:  rest and fluids oral decongestant started theraflu (heart started palpating - she stopped this)    Gale thought she was feeling better Saturday.  By Sunday and today her symptoms are worsening and she is becoming light headed and dizzy at times            Problem list and histories reviewed & adjusted, as indicated.  Additional history: as documented    Patient Active Problem List   Diagnosis     Swelling, mass, or lump in head and neck     Left knee injury     ACP (advance care planning)     Hypothyroidism, unspecified type     Past Surgical History:   Procedure Laterality Date     CARDIAC SURGERY      tachacardia in the past     SOFT TISSUE SURGERY      wisdom teeth extraction       Social History   Substance Use Topics     Smoking status: Never Smoker     Smokeless tobacco: Never Used     Alcohol use No     Family History   Problem Relation Age of Onset     CEREBROVASCULAR DISEASE Maternal Grandmother      CVA     DIABETES Maternal Grandmother      Breast Cancer Maternal Grandmother      Hypertension Father      Hyperlipidemia Mother      Depression Mother      Obesity Mother      Other Cancer Paternal Grandmother      Coronary Artery Disease No family hx of      Colon Cancer No family hx of      Prostate Cancer No family hx of      Anxiety Disorder No family hx of      MENTAL ILLNESS No family hx of      Substance Abuse No family hx of      Anesthesia Reaction No family hx of      Asthma No family hx of      OSTEOPOROSIS No  "family hx of      Genetic Disorder No family hx of      Thyroid Disease No family hx of          Current Outpatient Prescriptions   Medication Sig Dispense Refill     levothyroxine (SYNTHROID/LEVOTHROID) 100 MCG tablet Take 1 tablet (100 mcg) by mouth daily 30 tablet 3     Allergies   Allergen Reactions     Amoxicillin Rash         Reviewed and updated as needed this visit by clinical staffTobacco  Allergies  Meds  Med Hx  Surg Hx  Fam Hx  Soc Hx      Reviewed and updated as needed this visit by Provider         ROS:  CONSTITUTIONAL:chills, fatigue and fever   INTEGUMENTARY/SKIN: NEGATIVE for worrisome rashes, moles or lesions  ENT/MOUTH: ear pain bilateral, postnasal drainage, sore throat and tooth pain  RESP:cough-productive and sputum dark green  CV: NEGATIVE for chest pain, palpitations or peripheral edema    OBJECTIVE:     /72 (BP Location: Right arm, Patient Position: Sitting, Cuff Size: Adult Regular)  Pulse 92  Temp 100.3  F (37.9  C) (Tympanic)  Resp 18  Ht 5' 6\" (1.676 m)  Wt 190 lb (86.2 kg)  LMP 06/15/2017  SpO2 99%  BMI 30.67 kg/m2  Body mass index is 30.67 kg/(m^2).   GENERAL: healthy, alert and no distress  HENT: normal cephalic/atraumatic, ear canals and TM's normal, nose and mouth without ulcers or lesions, oropharynx clear, oral mucous membranes moist and sinuses: maxillary, frontal tenderness on both sides, maxillary, frontal swelling on both sides  NECK: no adenopathy, no asymmetry, masses, or scars and thyroid normal to palpation  RESP: lungs clear to auscultation - no rales, rhonchi or wheezes  CV: regular rate and rhythm, normal S1 S2, no S3 or S4, no murmur, click or rub, no peripheral edema and peripheral pulses strong  ABDOMEN: soft, nontender, no hepatosplenomegaly, no masses and bowel sounds normal  PSYCH: mentation appears normal, affect normal/bright    Diagnostic Test Results:  Results for orders placed or performed in visit on 09/18/17 (from the past 24 hour(s)) "   Strep, Rapid Screen   Result Value Ref Range    Specimen Description Throat     Rapid Strep A Screen       NEGATIVE: No Group A streptococcal antigen detected by immunoassay, await culture report.       ASSESSMENT:       PLAN:   ASSESSMENT / PLAN:  (J01.00) Acute maxillary sinusitis, recurrence not specified  (primary encounter diagnosis)  Comment: With worsening symptoms over the past couple of days will treat with an antibiotic. Discussed prednisone and with her rapid heart rate over the weekend from OTC theraflu held off on that for now. Gale should rest and stay hydrated. If her symptoms do not improve or worsen she should follow up.  Plan:  levofloxacin (LEVAQUIN) 500 MG tablet            (R07.0) Throat pain  Comment: symptomatic treatment for sore throat at this time. Culture is pending  Plan:  Strep, Rapid Screen,    Beta strep group A culture                    See Patient Instructions    DAMON Soler [facility] [address]

## 2017-09-19 ASSESSMENT — ANXIETY QUESTIONNAIRES: GAD7 TOTAL SCORE: 1

## 2017-09-20 LAB
BACTERIA SPEC CULT: NORMAL
SPECIMEN SOURCE: NORMAL

## 2017-09-29 ENCOUNTER — HOSPITAL ENCOUNTER (OUTPATIENT)
Dept: ULTRASOUND IMAGING | Facility: HOSPITAL | Age: 27
Discharge: HOME | End: 2017-09-29
Attending: NURSE PRACTITIONER | Admitting: NURSE PRACTITIONER
Payer: COMMERCIAL

## 2017-09-29 ENCOUNTER — TELEPHONE (OUTPATIENT)
Dept: FAMILY MEDICINE | Facility: OTHER | Age: 27
End: 2017-09-29

## 2017-09-29 DIAGNOSIS — J01.00 ACUTE NON-RECURRENT MAXILLARY SINUSITIS: ICD-10-CM

## 2017-09-29 DIAGNOSIS — J01.00 ACUTE MAXILLARY SINUSITIS: Primary | ICD-10-CM

## 2017-09-29 PROCEDURE — 76830 TRANSVAGINAL US NON-OB: CPT | Mod: TC

## 2017-09-29 PROCEDURE — 76856 US EXAM PELVIC COMPLETE: CPT | Mod: TC

## 2017-09-29 RX ORDER — AZITHROMYCIN 250 MG/1
TABLET, FILM COATED ORAL
Qty: 6 TABLET | Refills: 0 | Status: SHIPPED | OUTPATIENT
Start: 2017-09-29 | End: 2017-10-31

## 2017-09-29 NOTE — TELEPHONE ENCOUNTER
Pt called- states she was seen by Danii Landa last week finished Levaquin on wed. Still has a lingering cough- tight with occasional mucus production (green) - does not want to turn into pneumonia. Karin Villanueva LPN

## 2017-10-31 ENCOUNTER — OFFICE VISIT (OUTPATIENT)
Dept: OBGYN | Facility: OTHER | Age: 27
End: 2017-10-31
Attending: OBSTETRICS & GYNECOLOGY
Payer: COMMERCIAL

## 2017-10-31 VITALS
OXYGEN SATURATION: 98 % | SYSTOLIC BLOOD PRESSURE: 124 MMHG | DIASTOLIC BLOOD PRESSURE: 73 MMHG | WEIGHT: 185 LBS | BODY MASS INDEX: 29.73 KG/M2 | HEART RATE: 76 BPM | HEIGHT: 66 IN

## 2017-10-31 DIAGNOSIS — N91.2 ABSENCE OF MENSTRUATION: Primary | ICD-10-CM

## 2017-10-31 DIAGNOSIS — N97.9 FEMALE INFERTILITY: ICD-10-CM

## 2017-10-31 LAB — HCG UR QL: NEGATIVE

## 2017-10-31 PROCEDURE — 99214 OFFICE O/P EST MOD 30 MIN: CPT | Performed by: OBSTETRICS & GYNECOLOGY

## 2017-10-31 PROCEDURE — 81025 URINE PREGNANCY TEST: CPT | Performed by: OBSTETRICS & GYNECOLOGY

## 2017-10-31 RX ORDER — MEDROXYPROGESTERONE ACETATE 10 MG
10 TABLET ORAL DAILY
Qty: 10 TABLET | Refills: 3 | Status: SHIPPED | OUTPATIENT
Start: 2017-10-31 | End: 2017-11-10

## 2017-10-31 ASSESSMENT — PAIN SCALES - GENERAL: PAINLEVEL: NO PAIN (0)

## 2017-10-31 NOTE — MR AVS SNAPSHOT
After Visit Summary   10/31/2017    Gale Adams    MRN: [id number]           Patient Information     Date Of Birth          1990        Visit Information        Provider Department      10/31/2017 3:00 PM Lam Bravo MD [facility]        Today's Diagnoses     Absence of menstruation    -  1    Female infertility          Care Instructions    F/u 4-6 wks          Follow-ups after your visit        Your next 10 appointments already scheduled     Dec 05, 2017  3:30 PM CST   (Arrive by 3:15 PM)   SHORT with Lam Bravo MD   [facility] [place] ([facility] [place] )    [address]  [place] [address]   [phone]              Future tests that were ordered for you today     Open Future Orders        Priority Expected Expires Ordered    Progesterone Routine 11/20/2017 12/31/2017 10/31/2017    DHEA sulfate Routine 11/20/2017 12/31/2017 10/31/2017    Testosterone Free and Total Routine 11/20/2017 12/31/2017 10/31/2017    Follicle stimulating hormone Routine 11/6/2017 11/30/2017 10/31/2017    Estradiol Routine 11/6/2017 11/30/2017 10/31/2017    Lutropin Routine 11/6/2017 11/30/2017 10/31/2017            Who to contact     If you have questions or need follow up information about today's clinic visit or your schedule please contact [facility] directly at [phone].  Normal or non-critical lab and imaging results will be communicated to you by MyChart, letter or phone within 4 business days after the clinic has received the results. If you do not hear from us within 7 days, please contact the clinic through MyChart or phone. If you have a critical or abnormal lab result, we will notify you by phone as soon as possible.  Submit refill requests through [employer] or call your pharmacy and they will forward the refill request to us. Please allow 3 business days for your refill to be completed.          Additional Information About Your Visit       "  MyChart Information     [employer] gives you secure access to your electronic health record. If you see a primary care provider, you can also send messages to your care team and make appointments. If you have questions, please call your primary care clinic.  If you do not have a primary care provider, please call [phone] and they will assist you.        Care EveryWhere ID     This is your Care EveryWhere ID. This could be used by other organizations to access your [facility] medical records  [id number]        Your Vitals Were     Pulse Height Pulse Oximetry BMI (Body Mass Index)          76 5' 6\" (1.676 m) 98% 29.86 kg/m2         Blood Pressure from Last 3 Encounters:   10/31/17 124/73   09/18/17 104/72   09/05/17 114/68    Weight from Last 3 Encounters:   10/31/17 185 lb (83.9 kg)   09/18/17 190 lb (86.2 kg)   09/05/17 190 lb (86.2 kg)              We Performed the Following     HCG qualitative urine - CSC and Range          Today's Medication Changes          These changes are accurate as of: 10/31/17  3:51 PM.  If you have any questions, ask your nurse or doctor.               Start taking these medicines.        Dose/Directions    medroxyPROGESTERone 10 MG tablet   Commonly known as:  PROVERA   Used for:  Absence of menstruation        Dose:  10 mg   Take 1 tablet (10 mg) by mouth daily for 10 days   Quantity:  10 tablet   Refills:  3            Where to get your medicines      These medications were sent to [facility] [address]  [address] [place] [address]     Phone:  [phone]     medroxyPROGESTERone 10 MG tablet                Primary Care Provider Office Phone # Fax #    Danii DAMON Robb [phone]       [address] [address]        Equal Access to Services     [facility] AH: Dot lomeli Sojosefa, waaxda luqadaha, qaybta kaalmada adeegyada, ramin warren. So wa " [phone].    ATENCIÓN: Si misa trinidad, tiene a hewitt disposición servicios gratuitos de asistencia lingüística. Nuno chow [phone].    We comply with applicable federal civil rights laws and [place] laws. We do not discriminate on the basis of race, color, national origin, age, disability, sex, sexual orientation, or gender identity.            Thank you!     Thank you for choosing [facility]  for your care. Our goal is always to provide you with excellent care. Hearing back from our patients is one way we can continue to improve our services. Please take a few minutes to complete the written survey that you may receive in the mail after your visit with us. Thank you!             Your Updated Medication List - Protect others around you: Learn how to safely use, store and throw away your medicines at www.disposemymeds.org.          This list is accurate as of: 10/31/17  3:51 PM.  Always use your most recent med list.                   Brand Name Dispense Instructions for use Diagnosis    levofloxacin 500 MG tablet    LEVAQUIN    10 tablet    Take 1 tablet (500 mg) by mouth daily    Acute maxillary sinusitis, recurrence not specified       levothyroxine 100 MCG tablet    SYNTHROID/LEVOTHROID    30 tablet    Take 1 tablet (100 mcg) by mouth daily    Hypothyroidism, unspecified type       medroxyPROGESTERone 10 MG tablet    PROVERA    10 tablet    Take 1 tablet (10 mg) by mouth daily for 10 days    Absence of menstruation

## 2017-10-31 NOTE — NURSING NOTE
"Chief Complaint   Patient presents with     Consult     King/preconception/ possible fertility concerns       Initial /73 (BP Location: Left arm, Cuff Size: Adult Regular)  Pulse 76  Ht 5' 6\" (1.676 m)  Wt 185 lb (83.9 kg)  SpO2 98%  BMI 29.86 kg/m2 Estimated body mass index is 29.86 kg/(m^2) as calculated from the following:    Height as of this encounter: 5' 6\" (1.676 m).    Weight as of this encounter: 185 lb (83.9 kg).  Medication Reconciliation: autumn Dean      "

## 2017-10-31 NOTE — PROGRESS NOTES
CC: infertility consult   HPI: Gale Adams is a 27 year old female No LMP recorded. Menses are irregular, q 2 months, lasting 5 days.  The patient has been off OCP's for 2 yrs and actively trying to conceive for 9 months.  Periods were regular on OCP's.  She has h/o hypothyroid but currently well controlled.  Prolactin nml in past.  Denies galactorrhea or hirsutism.  On PNV.  See prior evals.  Recent US nml.      Past GYN history:  No STD history       Last PAP smear:  Normal   Menarche at age 14  History of abnormal PAP: No  History of surgery to cervix: No   Dysmenorrhea none.   PID History: No  History of  IUD use: No  Galactorrhea: No  JORGE exposure: No  Hirsuitism: No  Intolerance to hot or cold: No  Significant change in weight within last year: No           Prior pregnancy history is as follows: NA    PAST INFERTILITY EVALUATION AND TREATMENT:  Patient's work up has included:  Ultrasound Yes, nml      Hormonal evaluation has included:   TSH/Prolacin  nml  Semen Analysis on partner was Not Examined,    Past infertility treatment included non.    Partner is 28 years old. No history of trauma to the genitalia, medications, tobacco, drug use. No prior SA. No prior children.    Allergies: Amoxicillin                    Past Medical History:   Diagnosis Date     Acute upper respiratory infections of unspecified site 01/03/2001     Need for prophylactic vaccination and inoculation against other specified disease 03/05/2003       Past Surgical History:   Procedure Laterality Date     CARDIAC SURGERY      tachacardia in the past     SOFT TISSUE SURGERY      wisdom teeth extraction           Social History   Substance Use Topics     Smoking status: Never Smoker     Smokeless tobacco: Never Used     Alcohol use No              Family History   Problem Relation Age of Onset     CEREBROVASCULAR DISEASE Maternal Grandmother      CVA     DIABETES Maternal Grandmother      Breast Cancer Maternal Grandmother       "Hypertension Father      Hyperlipidemia Mother      Depression Mother      Obesity Mother      Other Cancer Paternal Grandmother      Coronary Artery Disease No family hx of      Colon Cancer No family hx of      Prostate Cancer No family hx of      Anxiety Disorder No family hx of      MENTAL ILLNESS No family hx of      Substance Abuse No family hx of      Anesthesia Reaction No family hx of      Asthma No family hx of      OSTEOPOROSIS No family hx of      Genetic Disorder No family hx of      Thyroid Disease No family hx of        Current Outpatient Prescriptions   Medication Sig Dispense Refill     medroxyPROGESTERone (PROVERA) 10 MG tablet Take 1 tablet (10 mg) by mouth daily for 10 days 10 tablet 3     levothyroxine (SYNTHROID/LEVOTHROID) 100 MCG tablet Take 1 tablet (100 mcg) by mouth daily 30 tablet 3     levofloxacin (LEVAQUIN) 500 MG tablet Take 1 tablet (500 mg) by mouth daily (Patient not taking: Reported on 10/31/2017) 10 tablet 0             EXAM:  Blood pressure 124/73, pulse 76, height 5' 6\" (1.676 m), weight 185 lb (83.9 kg), SpO2 98 %, not currently breastfeeding.   BMI= Body mass index is 29.86 kg/(m^2).  General - pleasant female in no acute distress  Normal mental status  Abdomen - soft, nontender, nondistended, no hepatosplenomegaly.  Pelvic/rectal - deferred.  Extrem:  neg        ASSESSMENT/ PLAN:  (N91.2) Irregular menses, likely anovulatory  Plan: medroxyPROGESTERone (PROVERA) 10 MG tablet, HCG        qualitative urine - CSC and Range          (N97.9) Female infertility  Plan: Follicle stimulating hormone, Estradiol,         Lutropin, Progesterone, DHEA sulfate,         Testosterone Free and Total        HSG, SA, Day 21 progesterone    I will plan to see pt back after infertility w/u to discuss results and POC.  Healthy life style measures discussed.   Pt has my card and phone number to call as needed if problems in the interim or she does not here her results. 30 minutes were spent with " the patient with greater than 50% of the visit spent in face-to-face counseling and coordination of care.    Lam Bravo MD

## 2017-11-04 ENCOUNTER — APPOINTMENT (OUTPATIENT)
Dept: GENERAL RADIOLOGY | Facility: HOSPITAL | Age: 27
End: 2017-11-04
Attending: NURSE PRACTITIONER
Payer: COMMERCIAL

## 2017-11-04 ENCOUNTER — HOSPITAL ENCOUNTER (EMERGENCY)
Facility: HOSPITAL | Age: 27
Discharge: HOME | End: 2017-11-04
Attending: NURSE PRACTITIONER | Admitting: NURSE PRACTITIONER
Payer: COMMERCIAL

## 2017-11-04 VITALS — SYSTOLIC BLOOD PRESSURE: 134 MMHG | OXYGEN SATURATION: 96 % | TEMPERATURE: 98.3 F | DIASTOLIC BLOOD PRESSURE: 80 MMHG

## 2017-11-04 DIAGNOSIS — J20.9 ACUTE BRONCHITIS, UNSPECIFIED ORGANISM: ICD-10-CM

## 2017-11-04 DIAGNOSIS — M62.838 NECK MUSCLE SPASM: ICD-10-CM

## 2017-11-04 LAB
BASOPHILS # BLD AUTO: 0.1 10E9/L (ref 0–0.2)
BASOPHILS NFR BLD AUTO: 0.5 %
CRP SERPL-MCNC: 9.6 MG/L (ref 0–8)
DIFFERENTIAL METHOD BLD: NORMAL
EOSINOPHIL # BLD AUTO: 0.2 10E9/L (ref 0–0.7)
EOSINOPHIL NFR BLD AUTO: 1.9 %
ERYTHROCYTE [DISTWIDTH] IN BLOOD BY AUTOMATED COUNT: 12.4 % (ref 10–15)
HCT VFR BLD AUTO: 38 % (ref 35–47)
HGB BLD-MCNC: 12.9 G/DL (ref 11.7–15.7)
IMM GRANULOCYTES # BLD: 0 10E9/L (ref 0–0.4)
IMM GRANULOCYTES NFR BLD: 0.4 %
LYMPHOCYTES # BLD AUTO: 2.6 10E9/L (ref 0.8–5.3)
LYMPHOCYTES NFR BLD AUTO: 26 %
MCH RBC QN AUTO: 29.4 PG (ref 26.5–33)
MCHC RBC AUTO-ENTMCNC: 33.9 G/DL (ref 31.5–36.5)
MCV RBC AUTO: 87 FL (ref 78–100)
MONOCYTES # BLD AUTO: 0.9 10E9/L (ref 0–1.3)
MONOCYTES NFR BLD AUTO: 9.2 %
NEUTROPHILS # BLD AUTO: 6.2 10E9/L (ref 1.6–8.3)
NEUTROPHILS NFR BLD AUTO: 62 %
NRBC # BLD AUTO: 0 10*3/UL
NRBC BLD AUTO-RTO: 0 /100
PLATELET # BLD AUTO: 289 10E9/L (ref 150–450)
RBC # BLD AUTO: 4.39 10E12/L (ref 3.8–5.2)
WBC # BLD AUTO: 9.9 10E9/L (ref 4–11)

## 2017-11-04 PROCEDURE — 25000125 ZZHC RX 250: Performed by: NURSE PRACTITIONER

## 2017-11-04 PROCEDURE — 40000275 ZZH STATISTIC RCP TIME EA 10 MIN

## 2017-11-04 PROCEDURE — 86140 C-REACTIVE PROTEIN: CPT | Performed by: NURSE PRACTITIONER

## 2017-11-04 PROCEDURE — 99214 OFFICE O/P EST MOD 30 MIN: CPT | Performed by: NURSE PRACTITIONER

## 2017-11-04 PROCEDURE — 25000132 ZZH RX MED GY IP 250 OP 250 PS 637

## 2017-11-04 PROCEDURE — 36415 COLL VENOUS BLD VENIPUNCTURE: CPT | Performed by: NURSE PRACTITIONER

## 2017-11-04 PROCEDURE — 94640 AIRWAY INHALATION TREATMENT: CPT

## 2017-11-04 PROCEDURE — 99214 OFFICE O/P EST MOD 30 MIN: CPT | Mod: 25

## 2017-11-04 PROCEDURE — 96372 THER/PROPH/DIAG INJ SC/IM: CPT

## 2017-11-04 PROCEDURE — 25000128 H RX IP 250 OP 636: Performed by: NURSE PRACTITIONER

## 2017-11-04 PROCEDURE — 85025 COMPLETE CBC W/AUTO DIFF WBC: CPT | Performed by: NURSE PRACTITIONER

## 2017-11-04 PROCEDURE — 71020 XR CHEST 2 VW: CPT | Mod: TC

## 2017-11-04 PROCEDURE — 94640 AIRWAY INHALATION TREATMENT: CPT | Mod: 76

## 2017-11-04 RX ORDER — ALBUTEROL SULFATE 90 UG/1
2 AEROSOL, METERED RESPIRATORY (INHALATION) EVERY 4 HOURS PRN
Qty: 1 INHALER | Refills: 0 | Status: SHIPPED | OUTPATIENT
Start: 2017-11-04 | End: 2018-02-22

## 2017-11-04 RX ORDER — KETOROLAC TROMETHAMINE 30 MG/ML
60 INJECTION, SOLUTION INTRAMUSCULAR; INTRAVENOUS ONCE
Status: COMPLETED | OUTPATIENT
Start: 2017-11-04 | End: 2017-11-04

## 2017-11-04 RX ORDER — IPRATROPIUM BROMIDE AND ALBUTEROL SULFATE 2.5; .5 MG/3ML; MG/3ML
3 SOLUTION RESPIRATORY (INHALATION) ONCE
Status: COMPLETED | OUTPATIENT
Start: 2017-11-04 | End: 2017-11-04

## 2017-11-04 RX ORDER — ALBUTEROL SULFATE 90 UG/1
AEROSOL, METERED RESPIRATORY (INHALATION)
Status: COMPLETED
Start: 2017-11-04 | End: 2017-11-04

## 2017-11-04 RX ORDER — ALBUTEROL SULFATE 90 UG/1
2 AEROSOL, METERED RESPIRATORY (INHALATION) ONCE
Status: DISCONTINUED | OUTPATIENT
Start: 2017-11-04 | End: 2017-11-04 | Stop reason: HOSPADM

## 2017-11-04 RX ORDER — GUAIFENESIN AND CODEINE PHOSPHATE 100; 10 MG/5ML; MG/5ML
120 SOLUTION ORAL ONCE
Status: DISCONTINUED | OUTPATIENT
Start: 2017-11-04 | End: 2017-11-04 | Stop reason: HOSPADM

## 2017-11-04 RX ADMIN — KETOROLAC TROMETHAMINE 60 MG: 60 INJECTION, SOLUTION INTRAMUSCULAR at 19:14

## 2017-11-04 RX ADMIN — IPRATROPIUM BROMIDE AND ALBUTEROL SULFATE 3 ML: .5; 3 SOLUTION RESPIRATORY (INHALATION) at 19:40

## 2017-11-04 RX ADMIN — ALBUTEROL SULFATE: 90 AEROSOL, METERED RESPIRATORY (INHALATION) at 20:23

## 2017-11-04 ASSESSMENT — ENCOUNTER SYMPTOMS
FATIGUE: 1
DIARRHEA: 0
FEVER: 0
NAUSEA: 0
SHORTNESS OF BREATH: 1
NECK PAIN: 1
ABDOMINAL PAIN: 0
SINUS PAIN: 0
COUGH: 1
MYALGIAS: 1
WHEEZING: 1
VOMITING: 0
CHILLS: 0
SINUS PRESSURE: 0
SORE THROAT: 0
WOUND: 0
APPETITE CHANGE: 0

## 2017-11-04 NOTE — ED PROVIDER NOTES
"  History     Chief Complaint   Patient presents with     Cough     for about 2 weeks     Neck Pain     right sided neck pain     The history is provided by the patient. No  was used.     Gale Adams is a 27 year old female who presents today with a CC of cough x 2 weeks and neck pain associated with coughing hard.  She has been having some shortness of breath with exertion.  She notes wheezing after \"coughing fits\".  No fevers or chills.  She has been feeling fatigued.  She is up coughing all night.  She was exposed to a family member who is currently hospitalized with pneumonia.  She has tried elevating, mucinex, multi-symptom cough medicine without improvement.  She has right sided neck muscle pain and spasm that she feels is from coughing.  She has not taken anything for the pain today.  No injury.  No history of asthma or lung conditions.  She denies chronic disease conditions.  She does not tolerate sudafed or prednisone due to a history of transient tachycardia.      Problem List:    Patient Active Problem List    Diagnosis Date Noted     Hypothyroidism, unspecified type 03/15/2017     Priority: Medium     ACP (advance care planning) 06/17/2016     Priority: Medium     Advance Care Planning 6/17/2016: ACP Review of Chart / Resources Provided:  Reviewed chart for advance care plan.  Gale Adams has no plan or code status on file. Discussed available resources and provided with information. Confirmed code status reflects current choices pending further ACP discussions.  Confirmed/documented legally designated decision makers.  Added by Abi Llamas             Left knee injury 06/12/2015     Priority: Medium     Swelling, mass, or lump in head and neck 11/21/2013     Priority: Medium        Past Medical History:    Past Medical History:   Diagnosis Date     Acute upper respiratory infections of unspecified site 01/03/2001     Need for prophylactic vaccination and inoculation " against other specified disease 03/05/2003       Past Surgical History:    Past Surgical History:   Procedure Laterality Date     CARDIAC SURGERY      tachacardia in the past     SOFT TISSUE SURGERY      wisdom teeth extraction       Family History:    Family History   Problem Relation Age of Onset     CEREBROVASCULAR DISEASE Maternal Grandmother      CVA     DIABETES Maternal Grandmother      Breast Cancer Maternal Grandmother      Hypertension Father      Hyperlipidemia Mother      Depression Mother      Obesity Mother      Other Cancer Paternal Grandmother      Coronary Artery Disease No family hx of      Colon Cancer No family hx of      Prostate Cancer No family hx of      Anxiety Disorder No family hx of      MENTAL ILLNESS No family hx of      Substance Abuse No family hx of      Anesthesia Reaction No family hx of      Asthma No family hx of      OSTEOPOROSIS No family hx of      Genetic Disorder No family hx of      Thyroid Disease No family hx of        Social History:  Marital Status:   [2]  Social History   Substance Use Topics     Smoking status: Never Smoker     Smokeless tobacco: Never Used     Alcohol use No        Medications:      albuterol (PROAIR HFA/PROVENTIL HFA/VENTOLIN HFA) 108 (90 BASE) MCG/ACT Inhaler   tiZANidine (ZANAFLEX) 4 MG tablet   medroxyPROGESTERone (PROVERA) 10 MG tablet   levothyroxine (SYNTHROID/LEVOTHROID) 100 MCG tablet         Review of Systems   Constitutional: Positive for fatigue. Negative for appetite change, chills and fever.   HENT: Positive for ear pain. Negative for sinus pain, sinus pressure and sore throat.    Respiratory: Positive for cough, shortness of breath and wheezing.    Cardiovascular: Negative for chest pain.   Gastrointestinal: Negative for abdominal pain, diarrhea, nausea and vomiting.   Musculoskeletal: Positive for myalgias and neck pain.   Skin: Negative for rash and wound.       Physical Exam   BP: 134/80  Heart Rate: 80  Temp: 98.3  F (36.8   C)  SpO2: 96 %      Physical Exam   Constitutional: She is oriented to person, place, and time. She appears well-developed. She is cooperative.  Non-toxic appearance. She appears ill. No distress.   HENT:   Head: Normocephalic and atraumatic.   Right Ear: Tympanic membrane, external ear and ear canal normal.   Left Ear: Tympanic membrane, external ear and ear canal normal.   Mouth/Throat: Uvula is midline and oropharynx is clear and moist.   Eyes: Conjunctivae are normal.   Neck: Normal range of motion. Muscular tenderness (right trapezius) present. No spinous process tenderness present. No rigidity. No edema and no erythema present.   Cardiovascular: Normal rate and regular rhythm.    Pulmonary/Chest: No respiratory distress. She has decreased breath sounds. She has no wheezes. She has no rhonchi. She has no rales.   Lymphadenopathy:     She has no cervical adenopathy.   Neurological: She is alert and oriented to person, place, and time.   Skin: Skin is warm and dry.   Psychiatric: She has a normal mood and affect. Her behavior is normal.   Nursing note and vitals reviewed.      ED Course     ED Course     Procedures    Results for orders placed or performed during the hospital encounter of 11/04/17   Chest XR,  PA & LAT    Narrative    XR CHEST 2 VW    HISTORY: 27 years Female cough, exposure to pneumonia    COMPARISON: None    TECHNIQUE: 2 views of the chest were obtained.    FINDINGS: Two views of the chest were obtained. Heart size and  pulmonary vascularity are within normal limits, lungs are clear both  views. No consolidating air space opacities are present.          Impression    IMPRESSION: Clear chest.    GHAZAL VELASQUEZ MD   CBC with platelets differential   Result Value Ref Range    WBC 9.9 4.0 - 11.0 10e9/L    RBC Count 4.39 3.8 - 5.2 10e12/L    Hemoglobin 12.9 11.7 - 15.7 g/dL    Hematocrit 38.0 35.0 - 47.0 %    MCV 87 78 - 100 fl    MCH 29.4 26.5 - 33.0 pg    MCHC 33.9 31.5 - 36.5 g/dL    RDW 12.4  10.0 - 15.0 %    Platelet Count 289 150 - 450 10e9/L    Diff Method Automated Method     % Neutrophils 62.0 %    % Lymphocytes 26.0 %    % Monocytes 9.2 %    % Eosinophils 1.9 %    % Basophils 0.5 %    % Immature Granulocytes 0.4 %    Nucleated RBCs 0 0 /100    Absolute Neutrophil 6.2 1.6 - 8.3 10e9/L    Absolute Lymphocytes 2.6 0.8 - 5.3 10e9/L    Absolute Monocytes 0.9 0.0 - 1.3 10e9/L    Absolute Eosinophils 0.2 0.0 - 0.7 10e9/L    Absolute Basophils 0.1 0.0 - 0.2 10e9/L    Abs Immature Granulocytes 0.0 0 - 0.4 10e9/L    Absolute Nucleated RBC 0.0    CRP inflammation   Result Value Ref Range    CRP Inflammation 9.6 (H) 0.0 - 8.0 mg/L     Medications   ipratropium - albuterol 0.5 mg/2.5 mg/3 mL (DUONEB) neb solution 3 mL (3 mLs Nebulization Given 11/4/17 1940)   ketorolac (TORADOL) injection 60 mg (60 mg Intramuscular Given 11/4/17 1914)   albuterol (PROAIR HFA/PROVENTIL HFA/VENTOLIN HFA) 108 (90 BASE) MCG/ACT Inhaler (  Given 11/4/17 2023)     Observed for a minimum of 20 minutes, tolerated medications well, no adverse efects noted, reports pain is 1/10 on discharge.    Assessments & Plan (with Medical Decision Making)     I have reviewed the nursing notes.    I have reviewed the findings, diagnosis, plan and need for follow up with the patient.  madie Adams is a 27 year old female who presents today with a CC of cough x 2 weeks, exposure to pneumonia and neck muscle tightness.  She was given a neb treatment and some toradol with significant relief.  Chest x-ray clear, does not show any signs of consolidation, lab work is reassuring.  Will treat her symptomatically with cheratussin, albuterol, OTC NSAIDs, and Zanaflex as needed for neck muscle spasm.  Cautioned against taking cheratussin and Zanaflex together, could cause increased sedation.  Do not drive or participate in activities that require mental alertness.  She verbalized understanding and is happy with this plan.      Discharge Medication List as of  11/4/2017  8:05 PM      START taking these medications    Details   albuterol (PROAIR HFA/PROVENTIL HFA/VENTOLIN HFA) 108 (90 BASE) MCG/ACT Inhaler Inhale 2 puffs into the lungs every 4 hours as needed for shortness of breath / dyspnea, wheezing or other (cough), Disp-1 Inhaler, R-0, E-Prescribe      tiZANidine (ZANAFLEX) 4 MG tablet Take 0.5-1 tablets (2-4 mg) by mouth nightly as needed for muscle spasms (MUSCLE SPASM), Disp-15 tablet, R-0, E-Prescribe             Final diagnoses:   Acute bronchitis, unspecified organism   Neck muscle spasm       11/4/2017   [facility]     Gale Lara NP  11/05/17 [id number]

## 2017-11-04 NOTE — ED NOTES
Pt presents today alone for c/o cough that has caused nose bleeds and she has been coughing so much she throws up, he throat hurts from coughing so much but doesn't hurt to swallow. She is now experiencing neck pain as well that she feels maybe from over exerting with coughing so much for so long.

## 2017-11-04 NOTE — ED AVS SNAPSHOT
[facility]    [address] [address]    Phone:  [phone]                                       Gale Adams   MRN: [id number]    Department:  [facility]   Date of Visit:  11/4/2017           After Visit Summary Signature Page     I have received my discharge instructions, and my questions have been answered. I have discussed any challenges I see with this plan with the nurse or doctor.    ..........................................................................................................................................  Patient/Patient Representative Signature      ..........................................................................................................................................  Patient Representative Print Name and Relationship to Patient    ..................................................               ................................................  Date                                            Time    ..........................................................................................................................................  Reviewed by Signature/Title    ...................................................              ..............................................  Date                                                            Time

## 2017-11-04 NOTE — ED AVS SNAPSHOT
[facility]    [address] [address]    Phone:  [phone]                                       Gale Adams   MRN: [id number]    Department:  [facility]   Date of Visit:  11/4/2017           Patient Information     Date Of Birth          1990        Your diagnoses for this visit were:     Acute bronchitis, unspecified organism        You were seen by Gale Lara NP.      Follow-up Information     Follow up with [facility].    Specialty:  EMERGENCY MEDICINE    Why:  As needed, If symptoms worsen, or concerns develop    Contact information:    [address] [place] [address] [phone]    Additional information:    From [place] Area: Take [address]. Turn left at [address]. Turn left at the first stoplight on [address]. At the first stop sign, take a right onto [address]. Take a left into the parking lot and continue through until you reach the North enterance of the building.       From [place]: Take [address]. Take the [address] ramp towards [place]. Turn left onto [address]. Take a slight right onto [address]. Turn left at the first stoplight on [address]. At the first stop sign, take a right onto [address]. Take a left into the parking lot and continue through until you reach the North enterance of the building.       From [place]: Take [address]. Take a right at [address]. At the first stop sign, take a right onto [address]. Take a left into the parking lot and continue through until you reach the North enterance of the building.         Follow up with Danii Landa APRN CNP.    Specialty:  Family Practice    Why:  As needed, if symptoms do not improve    Contact information:    [address]  [facility] [address]  [phone]          Discharge Instructions         Bronchitis, Viral (Adult)    You  have a viral bronchitis. Bronchitis is inflammation and swelling of the lining of the lungs. This is often caused by an infection. Symptoms include a dry, hacking cough that is worse at night. The cough may bring up yellow-green mucus. You may also feel short of breath or wheeze. Other symptoms may include tiredness, chest discomfort, and chills.  Bronchitis that is caused by a virus is not treated with antibiotics. Instead, medicines may be given to help relieve symptoms. Symptoms can last up to 2 weeks, although the cough may last much longer.  This illness is contagious during the first few days and is spread through the air by coughing and sneezing, or by direct contact (touching the sick person and then touching your own eyes, nose, or mouth).  Most viral illnesses resolve within 10 to 14 days with rest and simple home remedies, although they may sometimes last for several weeks.  Home care    If symptoms are severe, rest at home for the first 2 to 3 days. When you go back to your usual activities, don't let yourself get too tired.    Do not smoke. Also avoid being exposed to secondhand smoke.    You may use over-the-counter medicine to control fever or pain, unless another pain medicine was prescribed. (Note: If you have chronic liver or kidney disease or have ever had a stomach ulcer or gastrointestinal bleeding, talk with your healthcare provider before using these medicines. Also talk to your provider if you are taking medicine to prevent blood clots.) Aspirin should never be given to anyone younger than 18 years of age who is ill with a viral infection or fever. It may cause severe liver or brain damage.    Your appetite may be poor, so a light diet is fine. Avoid dehydration by drinking 6 to 8 glasses of fluids per day (such as water, soft drinks, sports drinks, juices, tea, or soup). Extra fluids will help loosen secretions in the nose and lungs.    Over-the-counter cough, cold, and sore-throat medicines  will not shorten the length of the illness, but they may help to reduce symptoms. (Note: Do not use decongestants if you have high blood pressure.)  Follow-up care  Follow up with your healthcare provider, or as advised. If you had an X-ray or ECG (electrocardiogram), a specialist will review it. You will be notified of any new findings that may affect your care.  Note: If you are age 65 or older, or if you have a chronic lung disease or condition that affects your immune system, or you smoke, talk to your healthcare provider about having pneumococcal vaccinations and a yearly influenza vaccination (flu shot).  When to seek medical advice  Call your healthcare provider right away if any of these occur:    Fever of 100.4 F (38 C) or higher    Coughing up increased amounts of colored sputum    Weakness, drowsiness, headache, facial pain, ear pain, or a stiff neck  Call 911, or get immediate medical care  Contact emergency services right away if any of these occur:    Coughing up blood    Worsening weakness, drowsiness, headache, or stiff neck    Trouble breathing, wheezing, or pain with breathing  Date Last Reviewed: 9/13/2015 2000-2017 [employer]. [address] [place] [address]. All rights reserved. This information is not intended as a substitute for professional medical care. Always follow your healthcare professional's instructions.          Future Appointments        Provider Department Dept Phone Center    12/5/2017 3:30 PM Lam Bravo MD [phone] [facility]         Review of your medicines      START taking        Dose / Directions Last dose taken    albuterol 108 (90 BASE) MCG/ACT Inhaler   Commonly known as:  PROAIR HFA/PROVENTIL HFA/VENTOLIN HFA   Dose:  2 puff   Quantity:  1 Inhaler        Inhale 2 puffs into the lungs every 4 hours as needed for shortness of breath / dyspnea, wheezing or other (cough)   Refills:  0        tiZANidine 4 MG tablet    Commonly known as:  ZANAFLEX   Dose:  2-4 mg   Quantity:  15 tablet        Take 0.5-1 tablets (2-4 mg) by mouth nightly as needed for muscle spasms (MUSCLE SPASM)   Refills:  0          Our records show that you are taking the medicines listed below. If these are incorrect, please call your family doctor or clinic.        Dose / Directions Last dose taken    levothyroxine 100 MCG tablet   Commonly known as:  SYNTHROID/LEVOTHROID   Dose:  100 mcg   Quantity:  30 tablet        Take 1 tablet (100 mcg) by mouth daily   Refills:  3        medroxyPROGESTERone 10 MG tablet   Commonly known as:  PROVERA   Dose:  10 mg   Quantity:  10 tablet        Take 1 tablet (10 mg) by mouth daily for 10 days   Refills:  3                Prescriptions were sent or printed at these locations (2 Prescriptions)                   [facility] [id number]  [place] [address] [address] [facility] [address]   [address], [place] [address]    Telephone:  [phone]   Fax:  [phone]   Hours:                  E-Prescribed (2 of 2)         albuterol (PROAIR HFA/PROVENTIL HFA/VENTOLIN HFA) 108 (90 BASE) MCG/ACT Inhaler               tiZANidine (ZANAFLEX) 4 MG tablet                Procedures and tests performed during your visit     CBC with platelets differential    CRP inflammation    Chest XR,  PA & LAT      Orders Needing Specimen Collection     None      Pending Results     Date and Time Order Name Status Description    11/4/2017 1911 Chest XR,  PA & LAT In process             Pending Culture Results     No orders found from 11/2/2017 to 11/5/2017.            Thank you for choosing [facility]       Thank you for choosing [facility] for your care. Our goal is always to provide you with excellent care. Hearing back from our patients is one way we can continue to improve our services. Please take a few minutes to complete the written survey that you may receive in the mail after you visit with us. Thank you!        [employer] Information     "[employer]" gives you secure  access to your electronic health record. If you see a primary care provider, you can also send messages to your care team and make appointments. If you have questions, please call your primary care clinic.  If you do not have a primary care provider, please call [phone] and they will assist you.        Care EveryWhere ID     This is your Care EveryWhere ID. This could be used by other organizations to access your [facility] medical records  [id number]        Equal Access to Services     VAN ROMAN : Dot ospinao Sojosefa, walizada lusamadaha, qafrancisco jta kaalmafernando salguero, ramin warren. So [facility] [phone].    ATENCIÓN: Si habla español, tiene a hewitt disposición servicios gratuitos de asistencia lingüística. Llame al [phone].    We comply with applicable federal civil rights laws and [place] laws. We do not discriminate on the basis of race, color, national origin, age, disability, sex, sexual orientation, or gender identity.            After Visit Summary       This is your record. Keep this with you and show to your community pharmacist(s) and doctor(s) at your next visit.

## 2017-11-05 NOTE — DISCHARGE INSTRUCTIONS

## 2017-11-15 DIAGNOSIS — N97.9 FEMALE INFERTILITY: ICD-10-CM

## 2017-11-15 PROCEDURE — 83001 ASSAY OF GONADOTROPIN (FSH): CPT | Mod: 90 | Performed by: OBSTETRICS & GYNECOLOGY

## 2017-11-15 PROCEDURE — 84403 ASSAY OF TOTAL TESTOSTERONE: CPT | Mod: 90 | Performed by: OBSTETRICS & GYNECOLOGY

## 2017-11-15 PROCEDURE — 99000 SPECIMEN HANDLING OFFICE-LAB: CPT | Performed by: OBSTETRICS & GYNECOLOGY

## 2017-11-15 PROCEDURE — 82627 DEHYDROEPIANDROSTERONE: CPT | Mod: 90 | Performed by: OBSTETRICS & GYNECOLOGY

## 2017-11-15 PROCEDURE — 82670 ASSAY OF TOTAL ESTRADIOL: CPT | Mod: 90 | Performed by: OBSTETRICS & GYNECOLOGY

## 2017-11-15 PROCEDURE — 84144 ASSAY OF PROGESTERONE: CPT | Mod: 90 | Performed by: OBSTETRICS & GYNECOLOGY

## 2017-11-15 PROCEDURE — 83002 ASSAY OF GONADOTROPIN (LH): CPT | Mod: 90 | Performed by: OBSTETRICS & GYNECOLOGY

## 2017-11-15 PROCEDURE — 36415 COLL VENOUS BLD VENIPUNCTURE: CPT | Performed by: OBSTETRICS & GYNECOLOGY

## 2017-11-15 PROCEDURE — 84270 ASSAY OF SEX HORMONE GLOBUL: CPT | Mod: 90 | Performed by: OBSTETRICS & GYNECOLOGY

## 2017-11-16 LAB
ESTRADIOL SERPL-MCNC: 29 PG/ML
FSH SERPL-ACNC: 3.6 IU/L
LH SERPL-ACNC: 4.1 IU/L
PROGEST SERPL-MCNC: 0.8 NG/ML

## 2017-11-17 LAB — DHEA-S SERPL-MCNC: 363 UG/DL (ref 35–430)

## 2017-11-18 LAB
SHBG SERPL-SCNC: 40 NMOL/L (ref 30–135)
TESTOST FREE SERPL-MCNC: 0.65 NG/DL (ref 0.08–0.74)
TESTOST SERPL-MCNC: 41 NG/DL (ref 8–60)

## 2017-11-21 DIAGNOSIS — N97.9 FEMALE INFERTILITY: Primary | ICD-10-CM

## 2017-12-04 DIAGNOSIS — N97.9 FEMALE INFERTILITY: ICD-10-CM

## 2017-12-04 LAB — PROGEST SERPL-MCNC: 0.9 NG/ML

## 2017-12-04 PROCEDURE — 99000 SPECIMEN HANDLING OFFICE-LAB: CPT | Performed by: OBSTETRICS & GYNECOLOGY

## 2017-12-04 PROCEDURE — 36415 COLL VENOUS BLD VENIPUNCTURE: CPT | Performed by: OBSTETRICS & GYNECOLOGY

## 2017-12-04 PROCEDURE — 84144 ASSAY OF PROGESTERONE: CPT | Mod: 90 | Performed by: OBSTETRICS & GYNECOLOGY

## 2017-12-09 DIAGNOSIS — E03.9 HYPOTHYROIDISM, UNSPECIFIED TYPE: ICD-10-CM

## 2017-12-11 NOTE — TELEPHONE ENCOUNTER
Levothyroxine      Last Written Prescription Date: 6/14/17  Last Fill Quantity: 30,  # refills: 3   Last Office Visit with G, [facility] or [facility] prescribing provider: 9/18/17

## 2017-12-12 RX ORDER — LEVOTHYROXINE SODIUM 100 UG/1
TABLET ORAL
Qty: 30 TABLET | Refills: 5 | Status: SHIPPED | OUTPATIENT
Start: 2017-12-12 | End: 2018-07-16

## 2018-01-16 ENCOUNTER — TELEPHONE (OUTPATIENT)
Dept: OBGYN | Facility: OTHER | Age: 28
End: 2018-01-16

## 2018-01-16 DIAGNOSIS — N91.4 SECONDARY OLIGOMENORRHEA: Primary | ICD-10-CM

## 2018-01-16 NOTE — TELEPHONE ENCOUNTER
Patient was seen by Dr. Bravo on 10/31/17 for infertility and was given Provera at that appointment. She did have a period and was able to come in and do her lab work. 21 day progesterone was done on 12/4/17 showing non-ovulatory and patient was to call with next menstrual cycle to schedule HSG and follow up appointment. Patient called to and has not had a period since. Informed patient to take a pregnancy test and if that comes back negative to call us back and we will go from there.

## 2018-01-24 RX ORDER — MEDROXYPROGESTERONE ACETATE 10 MG
10 TABLET ORAL DAILY
Qty: 10 TABLET | Refills: 0 | Status: SHIPPED | OUTPATIENT
Start: 2018-01-24 | End: 2019-02-01

## 2018-01-24 NOTE — TELEPHONE ENCOUNTER
I would have her take Provera for 10 days again then call when she gets period to schedule HSG and f/u appt after that.    Erx for provera x 10- days sent to [facility]

## 2018-01-24 NOTE — TELEPHONE ENCOUNTER
See previous note. Patient called back this morning and still has not had a period and had a negative pregnancy test. She is wondering what the next step would be. Please advise.

## 2018-02-06 DIAGNOSIS — N97.9 FEMALE INFERTILITY: Primary | ICD-10-CM

## 2018-02-15 ENCOUNTER — HOSPITAL ENCOUNTER (OUTPATIENT)
Dept: GENERAL RADIOLOGY | Facility: HOSPITAL | Age: 28
Discharge: HOME | End: 2018-02-15
Attending: OBSTETRICS & GYNECOLOGY | Admitting: OBSTETRICS & GYNECOLOGY
Payer: COMMERCIAL

## 2018-02-15 DIAGNOSIS — N97.9 FEMALE INFERTILITY: ICD-10-CM

## 2018-02-15 PROCEDURE — 74740 X-RAY FEMALE GENITAL TRACT: CPT | Mod: TC

## 2018-02-15 PROCEDURE — 25000128 H RX IP 250 OP 636: Performed by: RADIOLOGY

## 2018-02-15 RX ORDER — IOPAMIDOL 612 MG/ML
50 INJECTION, SOLUTION INTRAVASCULAR ONCE
Status: COMPLETED | OUTPATIENT
Start: 2018-02-15 | End: 2018-02-15

## 2018-02-15 RX ADMIN — IOPAMIDOL 30 ML: 612 INJECTION, SOLUTION INTRAVENOUS at 13:49

## 2018-02-22 ENCOUNTER — OFFICE VISIT (OUTPATIENT)
Dept: OBGYN | Facility: OTHER | Age: 28
End: 2018-02-22
Attending: OBSTETRICS & GYNECOLOGY
Payer: COMMERCIAL

## 2018-02-22 VITALS
OXYGEN SATURATION: 98 % | HEART RATE: 84 BPM | BODY MASS INDEX: 29.73 KG/M2 | DIASTOLIC BLOOD PRESSURE: 76 MMHG | SYSTOLIC BLOOD PRESSURE: 112 MMHG | WEIGHT: 185 LBS | HEIGHT: 66 IN

## 2018-02-22 DIAGNOSIS — N91.2 ABSENCE OF MENSTRUATION: Primary | ICD-10-CM

## 2018-02-22 DIAGNOSIS — N97.9 FEMALE INFERTILITY: ICD-10-CM

## 2018-02-22 PROCEDURE — 99213 OFFICE O/P EST LOW 20 MIN: CPT | Performed by: OBSTETRICS & GYNECOLOGY

## 2018-02-22 RX ORDER — MEDROXYPROGESTERONE ACETATE 10 MG
10 TABLET ORAL DAILY
Qty: 10 TABLET | Refills: 1 | Status: SHIPPED | OUTPATIENT
Start: 2018-02-22 | End: 2019-02-01

## 2018-02-22 RX ORDER — CLOMIPHENE CITRATE 50 MG/1
100 TABLET ORAL DAILY
Qty: 2 TABLET | Refills: 1 | Status: SHIPPED | OUTPATIENT
Start: 2018-02-22 | End: 2018-04-16 | Stop reason: DRUGHIGH

## 2018-02-22 ASSESSMENT — PAIN SCALES - GENERAL: PAINLEVEL: NO PAIN (0)

## 2018-02-22 NOTE — PROGRESS NOTES
S:  F/u infertility    See my prior eval.  Pt returns to discuss results and POC.  Patients testing reviewed.  Nml Tsh, Day 3 Fsh, prolactin, HSG.   Preogesterone anovulatory.  SA abnormal (motility, morphology).  Urology consult pending.  No new complaints.          Patient Active Problem List   Diagnosis     Swelling, mass, or lump in head and neck     Left knee injury     ACP (advance care planning)     Hypothyroidism, unspecified type     Female infertility            Past Medical History:   Diagnosis Date     Acute upper respiratory infections of unspecified site 01/03/2001     Need for prophylactic vaccination and inoculation against other specified disease 03/05/2003            Past Surgical History:   Procedure Laterality Date     CARDIAC SURGERY      tachacardia in the past     SOFT TISSUE SURGERY      wisdom teeth extraction            Social History   Substance Use Topics     Smoking status: Never Smoker     Smokeless tobacco: Never Used     Alcohol use No            Family History   Problem Relation Age of Onset     CEREBROVASCULAR DISEASE Maternal Grandmother      CVA     DIABETES Maternal Grandmother      Breast Cancer Maternal Grandmother      Hypertension Father      Hyperlipidemia Mother      Depression Mother      Obesity Mother      Other Cancer Paternal Grandmother      Coronary Artery Disease No family hx of      Colon Cancer No family hx of      Prostate Cancer No family hx of      Anxiety Disorder No family hx of      MENTAL ILLNESS No family hx of      Substance Abuse No family hx of      Anesthesia Reaction No family hx of      Asthma No family hx of      OSTEOPOROSIS No family hx of      Genetic Disorder No family hx of      Thyroid Disease No family hx of                Allergies   Allergen Reactions     Amoxicillin Rash            Current Outpatient Prescriptions   Medication Sig Dispense Refill     medroxyPROGESTERone (PROVERA) 10 MG tablet Take 1 tablet (10 mg) by mouth daily for 10  "days 10 tablet 1     clomiPHENE (CLOMID) 50 MG tablet Take 2 tablets (100 mg) by mouth daily Day 3-7 of menstrual cycle 2 tablet 1     levothyroxine (SYNTHROID/LEVOTHROID) 100 MCG tablet TAKE 1 TABLET BY MOUTH DAILY 30 tablet 5          Review Of Systems  Constitutional:  Denies fever  GI/ negative except as noted per hpi    O:   /76 (BP Location: Left arm, Cuff Size: Adult Regular)  Pulse 84  Ht 5' 6\" (1.676 m)  Wt 185 lb (83.9 kg)  SpO2 98%  BMI 29.86 kg/m2  Gen:  NAD, A and O         A:  Infertility with chronic anovulation, irregular menses, possible male factor.      P:  Clomid 100mg po days 3-7 of menstrual cycle. R/B/SE's discussed.   Day 21 progesterone to confirm ovulation.  Continue timed intercourse, healthy lifestyle measures, ODK, PNV.  Urology consult pending.   Pt has my card and phone number to call as needed if problems in the interim or she does not hear her results. Provera Rx if she does not get her period to induce menses.  Will check pregnancy test prior to starting.   F/u 3 months if no conception. 15  minutes were spent with the patient with greater than 50% of the visit spent in face-to-face counseling and coordination of care.    "

## 2018-02-22 NOTE — NURSING NOTE
"Chief Complaint   Patient presents with     RECHECK     infertility        Initial /76 (BP Location: Left arm, Cuff Size: Adult Regular)  Pulse 84  Ht 5' 6\" (1.676 m)  Wt 185 lb (83.9 kg)  SpO2 98%  BMI 29.86 kg/m2 Estimated body mass index is 29.86 kg/(m^2) as calculated from the following:    Height as of this encounter: 5' 6\" (1.676 m).    Weight as of this encounter: 185 lb (83.9 kg).  Medication Reconciliation: complete     Piper Dean      "

## 2018-03-02 ENCOUNTER — OFFICE VISIT (OUTPATIENT)
Dept: CHIROPRACTIC MEDICINE | Facility: OTHER | Age: 28
End: 2018-03-02
Attending: CHIROPRACTOR
Payer: COMMERCIAL

## 2018-03-02 DIAGNOSIS — M99.03 SEGMENTAL AND SOMATIC DYSFUNCTION OF LUMBAR REGION: Primary | ICD-10-CM

## 2018-03-02 DIAGNOSIS — M54.50 ACUTE BILATERAL LOW BACK PAIN WITHOUT SCIATICA: ICD-10-CM

## 2018-03-02 DIAGNOSIS — M99.01 SEGMENTAL AND SOMATIC DYSFUNCTION OF CERVICAL REGION: ICD-10-CM

## 2018-03-02 DIAGNOSIS — M99.02 SEGMENTAL AND SOMATIC DYSFUNCTION OF THORACIC REGION: ICD-10-CM

## 2018-03-02 PROCEDURE — 98941 CHIROPRACT MANJ 3-4 REGIONS: CPT | Mod: AT | Performed by: CHIROPRACTOR

## 2018-03-02 PROCEDURE — 99212 OFFICE O/P EST SF 10 MIN: CPT | Mod: 25 | Performed by: CHIROPRACTOR

## 2018-03-02 NOTE — MR AVS SNAPSHOT
After Visit Summary   3/2/2018    Gale Adams    MRN: [id number]           Patient Information     Date Of Birth          1990        Visit Information        Provider Department      3/2/2018 12:30 PM Vince Manley DC  [facility]        Today's Diagnoses     Segmental and somatic dysfunction of lumbar region    -  1    Acute bilateral low back pain without sciatica        Segmental and somatic dysfunction of thoracic region        Segmental and somatic dysfunction of cervical region           Follow-ups after your visit        Who to contact     If you have questions or need follow up information about today's clinic visit or your schedule please contact  [facility] directly at [phone].  Normal or non-critical lab and imaging results will be communicated to you by [email or web address], letter or phone within 4 business days after the clinic has received the results. If you do not hear from us within 7 days, please contact the clinic through [email or web address] or phone. If you have a critical or abnormal lab result, we will notify you by phone as soon as possible.  Submit refill requests through Dine in or call your pharmacy and they will forward the refill request to us. Please allow 3 business days for your refill to be completed.          Additional Information About Your Visit        MyChart Information     Dine in gives you secure access to your electronic health record. If you see a primary care provider, you can also send messages to your care team and make appointments. If you have questions, please call your primary care clinic.  If you do not have a primary care provider, please call [phone] and they will assist you.        Care EveryWhere ID     This is your Care EveryWhere ID. This could be used by other organizations to access your [place] medical records  [id number]         Blood Pressure from Last 3 Encounters:   02/22/18 112/76   11/04/17 134/80   10/31/17  124/73    Weight from Last 3 Encounters:   02/22/18 185 lb (83.9 kg)   10/31/17 185 lb (83.9 kg)   09/18/17 190 lb (86.2 kg)              We Performed the Following     CHIROPRAC MANIP,SPINAL,3-4 REGIONS        Primary Care Provider Office Phone # Fax #    DAMON Aden [facility] [phone]       [address]  [facility] [address]        Equal Access to Services     [facility] : Hadii aad ku hadasho Soomaali, waaxda luqadaha, qaybta kaalmada adeegyada, waxay idiin hayaan adeeg kharanaveed leyva . So [facility] [phone].    ATENCIÓN: Si habla español, tiene a hewitt disposición servicios gratuitos de asistencia lingüística. [facility] [phone].    We comply with applicable federal civil rights laws and [place] laws. We do not discriminate on the basis of race, color, national origin, age, disability, sex, sexual orientation, or gender identity.            Thank you!     Thank you for choosing  [facility]  [facility] your care. Our goal is always to provide you with excellent care. Hearing back from our patients is one way we can continue to improve our services. Please take a few minutes to complete the written survey that you may receive in the mail after your visit with us. Thank you!             Your Updated Medication List - Protect others around you: Learn how to safely use, store and throw away your medicines at www.disposemymeds.org.          This list is accurate as of 3/2/18 11:59 PM.  Always use your most recent med list.                   Brand Name Dispense Instructions for use Diagnosis    clomiPHENE 50 MG tablet    CLOMID    2 tablet    Take 2 tablets (100 mg) by mouth daily Day 3-7 of menstrual cycle    Female infertility, Absence of menstruation       levothyroxine 100 MCG tablet    SYNTHROID/LEVOTHROID    30 tablet    TAKE 1 TABLET BY MOUTH DAILY    Hypothyroidism, unspecified type       medroxyPROGESTERone 10 MG tablet    PROVERA    10 tablet    Take 1 tablet (10 mg) by  mouth daily for 10 days    Absence of menstruation

## 2018-03-05 NOTE — PROGRESS NOTES
Subjective Finding:    Chief compalint: Patient presents with:  Back Pain  Neck Pain  , Pain Scale: 2/10, Intensity: sharp, Duration: 2 weeks, Radiating: no.    Date of injury:     Activities that the pain restricts:   Home/household/hobbies/social activities: yes.  Work duties: yes.  Sleep: no.  Makes symptoms better: rest.  Makes symptoms worse: activity, lumbar extension and lumbar flexion.  Have you seen anyone else for the symptoms? No.  Work related: no.  Automobile related injury: no.    Objective and Assessment:    Posture Analysis:   High shoulder: .  Head tilt: .  High iliac crest: .  Head carriage: forward.  Thoracic Kyphosis: neutral.  Lumbar Lordosis: forward.    Lumbar Range of Motion: extension decreased.  Cervical Range of Motion: extension decreased.  Thoracic Range of Motion: flexion decreased and extension decreased.  Extremity Range of Motion: .    Palpation:   Quad lumb: bilateral, referred pain: no  Lev scapulae: sharp pain, referred pain: no    Segmental dysfunction pre-treatment and treatment area: C3, C6, T7, T9 and L5.    Assessment post-treatment:  Cervical: ROM increased.  Thoracic: ROM increased.  Lumbar: ROM increased.    Comments: .      Complicating Factors: .    Procedure(s):  CMT:  71851 Chiropractic manipulative treatment 3-4 regions performed   Cervical: Diversified, See above for level, Supine, Thoracic: Diversified, See above for level, Prone and Lumbar: Diversified, See above for level, Side posture    Modalities:  None performed this visit    Therapeutic procedures:  None    Plan:  Treatment plan: PRN.  Instructed patient: ice 20 minutes every other hour as needed.  Short term goals: increase ROM.  Long term goals: increase strength.  Prognosis: very good.

## 2018-03-26 DIAGNOSIS — N97.9 FEMALE INFERTILITY: Primary | ICD-10-CM

## 2018-04-13 DIAGNOSIS — N97.9 FEMALE INFERTILITY: ICD-10-CM

## 2018-04-13 LAB — PROGEST SERPL-MCNC: 7.9 NG/ML

## 2018-04-13 PROCEDURE — 36415 COLL VENOUS BLD VENIPUNCTURE: CPT | Performed by: OBSTETRICS & GYNECOLOGY

## 2018-04-13 PROCEDURE — 99000 SPECIMEN HANDLING OFFICE-LAB: CPT | Performed by: OBSTETRICS & GYNECOLOGY

## 2018-04-13 PROCEDURE — 84144 ASSAY OF PROGESTERONE: CPT | Mod: 90 | Performed by: OBSTETRICS & GYNECOLOGY

## 2018-04-16 DIAGNOSIS — N91.2 ABSENCE OF MENSTRUATION: ICD-10-CM

## 2018-04-16 DIAGNOSIS — N97.9 FEMALE INFERTILITY: Primary | ICD-10-CM

## 2018-04-16 RX ORDER — CLOMIPHENE CITRATE 50 MG/1
150 TABLET ORAL DAILY
COMMUNITY
End: 2018-05-16

## 2018-05-15 DIAGNOSIS — N97.9 FEMALE INFERTILITY: ICD-10-CM

## 2018-05-15 LAB — PROGEST SERPL-MCNC: 0.8 NG/ML

## 2018-05-15 PROCEDURE — 84144 ASSAY OF PROGESTERONE: CPT | Mod: 90 | Performed by: OBSTETRICS & GYNECOLOGY

## 2018-05-15 PROCEDURE — 99000 SPECIMEN HANDLING OFFICE-LAB: CPT | Performed by: OBSTETRICS & GYNECOLOGY

## 2018-05-15 PROCEDURE — 36415 COLL VENOUS BLD VENIPUNCTURE: CPT | Performed by: OBSTETRICS & GYNECOLOGY

## 2018-05-16 ENCOUNTER — TELEPHONE (OUTPATIENT)
Dept: OBGYN | Facility: OTHER | Age: 28
End: 2018-05-16

## 2018-05-16 DIAGNOSIS — N97.9 FEMALE INFERTILITY: Primary | ICD-10-CM

## 2018-05-16 RX ORDER — CLOMIPHENE CITRATE 50 MG/1
200 TABLET ORAL DAILY
Qty: 20 TABLET | Refills: 1 | Status: SHIPPED | OUTPATIENT
Start: 2018-05-16 | End: 2018-07-02

## 2018-05-16 NOTE — TELEPHONE ENCOUNTER
OK erx done for Clomid 200mg (4 tabs)  days 3-7 of menstrual cycle.  Order day 21 progesterone cycle.

## 2018-06-20 DIAGNOSIS — N97.9 FEMALE INFERTILITY: ICD-10-CM

## 2018-06-20 PROCEDURE — 36415 COLL VENOUS BLD VENIPUNCTURE: CPT | Performed by: OBSTETRICS & GYNECOLOGY

## 2018-06-20 PROCEDURE — 99000 SPECIMEN HANDLING OFFICE-LAB: CPT | Performed by: OBSTETRICS & GYNECOLOGY

## 2018-06-20 PROCEDURE — 84144 ASSAY OF PROGESTERONE: CPT | Mod: 90 | Performed by: OBSTETRICS & GYNECOLOGY

## 2018-06-21 LAB — PROGEST SERPL-MCNC: 0.5 NG/ML

## 2018-06-26 ENCOUNTER — TELEPHONE (OUTPATIENT)
Dept: FAMILY MEDICINE | Facility: OTHER | Age: 28
End: 2018-06-26

## 2018-06-26 ENCOUNTER — APPOINTMENT (OUTPATIENT)
Dept: GENERAL RADIOLOGY | Facility: HOSPITAL | Age: 28
End: 2018-06-26
Attending: PHYSICIAN ASSISTANT
Payer: COMMERCIAL

## 2018-06-26 ENCOUNTER — HOSPITAL ENCOUNTER (EMERGENCY)
Facility: HOSPITAL | Age: 28
Discharge: HOME | End: 2018-06-26
Attending: PHYSICIAN ASSISTANT | Admitting: PHYSICIAN ASSISTANT
Payer: COMMERCIAL

## 2018-06-26 VITALS
OXYGEN SATURATION: 97 % | TEMPERATURE: 98 F | SYSTOLIC BLOOD PRESSURE: 120 MMHG | HEART RATE: 88 BPM | RESPIRATION RATE: 16 BRPM | DIASTOLIC BLOOD PRESSURE: 81 MMHG

## 2018-06-26 DIAGNOSIS — J20.9 ACUTE BRONCHITIS WITH SYMPTOMS GREATER THAN 10 DAYS: ICD-10-CM

## 2018-06-26 LAB
ANION GAP SERPL CALCULATED.3IONS-SCNC: 8 MMOL/L (ref 3–14)
BASOPHILS # BLD AUTO: 0 10E9/L (ref 0–0.2)
BASOPHILS NFR BLD AUTO: 0.3 %
BUN SERPL-MCNC: 12 MG/DL (ref 7–30)
CALCIUM SERPL-MCNC: 9.2 MG/DL (ref 8.5–10.1)
CHLORIDE SERPL-SCNC: 103 MMOL/L (ref 94–109)
CO2 SERPL-SCNC: 26 MMOL/L (ref 20–32)
CREAT SERPL-MCNC: 0.68 MG/DL (ref 0.52–1.04)
CRP SERPL-MCNC: 15.8 MG/L (ref 0–8)
D DIMER PPP DDU-MCNC: <200 NG/ML D-DU (ref 0–300)
DIFFERENTIAL METHOD BLD: ABNORMAL
EOSINOPHIL # BLD AUTO: 0.1 10E9/L (ref 0–0.7)
EOSINOPHIL NFR BLD AUTO: 0.8 %
ERYTHROCYTE [DISTWIDTH] IN BLOOD BY AUTOMATED COUNT: 12.9 % (ref 10–15)
ERYTHROCYTE [SEDIMENTATION RATE] IN BLOOD BY WESTERGREN METHOD: 16 MM/H (ref 0–20)
GFR SERPL CREATININE-BSD FRML MDRD: >90 ML/MIN/1.7M2
GLUCOSE SERPL-MCNC: 102 MG/DL (ref 70–99)
HCT VFR BLD AUTO: 37.5 % (ref 35–47)
HGB BLD-MCNC: 12.9 G/DL (ref 11.7–15.7)
IMM GRANULOCYTES # BLD: 0 10E9/L (ref 0–0.4)
IMM GRANULOCYTES NFR BLD: 0.2 %
LYMPHOCYTES # BLD AUTO: 1.2 10E9/L (ref 0.8–5.3)
LYMPHOCYTES NFR BLD AUTO: 9.8 %
MCH RBC QN AUTO: 29.6 PG (ref 26.5–33)
MCHC RBC AUTO-ENTMCNC: 34.4 G/DL (ref 31.5–36.5)
MCV RBC AUTO: 86 FL (ref 78–100)
MONOCYTES # BLD AUTO: 0.7 10E9/L (ref 0–1.3)
MONOCYTES NFR BLD AUTO: 6.2 %
NEUTROPHILS # BLD AUTO: 9.9 10E9/L (ref 1.6–8.3)
NEUTROPHILS NFR BLD AUTO: 82.7 %
NRBC # BLD AUTO: 0 10*3/UL
NRBC BLD AUTO-RTO: 0 /100
PLATELET # BLD AUTO: 240 10E9/L (ref 150–450)
POTASSIUM SERPL-SCNC: 4 MMOL/L (ref 3.4–5.3)
PROCALCITONIN SERPL-MCNC: 0.05 NG/ML
RBC # BLD AUTO: 4.36 10E12/L (ref 3.8–5.2)
SODIUM SERPL-SCNC: 137 MMOL/L (ref 133–144)
TROPONIN I SERPL-MCNC: <0.015 UG/L (ref 0–0.04)
WBC # BLD AUTO: 12 10E9/L (ref 4–11)

## 2018-06-26 PROCEDURE — 80048 BASIC METABOLIC PNL TOTAL CA: CPT | Performed by: PHYSICIAN ASSISTANT

## 2018-06-26 PROCEDURE — 99285 EMERGENCY DEPT VISIT HI MDM: CPT | Mod: 25

## 2018-06-26 PROCEDURE — 25000132 ZZH RX MED GY IP 250 OP 250 PS 637: Performed by: PHYSICIAN ASSISTANT

## 2018-06-26 PROCEDURE — 86140 C-REACTIVE PROTEIN: CPT | Performed by: PHYSICIAN ASSISTANT

## 2018-06-26 PROCEDURE — 25000128 H RX IP 250 OP 636: Performed by: PHYSICIAN ASSISTANT

## 2018-06-26 PROCEDURE — 85379 FIBRIN DEGRADATION QUANT: CPT | Performed by: PHYSICIAN ASSISTANT

## 2018-06-26 PROCEDURE — 84145 PROCALCITONIN (PCT): CPT | Performed by: PHYSICIAN ASSISTANT

## 2018-06-26 PROCEDURE — 71046 X-RAY EXAM CHEST 2 VIEWS: CPT | Mod: TC

## 2018-06-26 PROCEDURE — 99285 EMERGENCY DEPT VISIT HI MDM: CPT | Performed by: PHYSICIAN ASSISTANT

## 2018-06-26 PROCEDURE — 96372 THER/PROPH/DIAG INJ SC/IM: CPT

## 2018-06-26 PROCEDURE — 93005 ELECTROCARDIOGRAM TRACING: CPT

## 2018-06-26 PROCEDURE — 85025 COMPLETE CBC W/AUTO DIFF WBC: CPT | Performed by: PHYSICIAN ASSISTANT

## 2018-06-26 PROCEDURE — 85652 RBC SED RATE AUTOMATED: CPT | Performed by: PHYSICIAN ASSISTANT

## 2018-06-26 PROCEDURE — 84484 ASSAY OF TROPONIN QUANT: CPT | Performed by: PHYSICIAN ASSISTANT

## 2018-06-26 PROCEDURE — 36415 COLL VENOUS BLD VENIPUNCTURE: CPT | Performed by: PHYSICIAN ASSISTANT

## 2018-06-26 RX ORDER — ACETAMINOPHEN 325 MG/1
975 TABLET ORAL ONCE
Status: COMPLETED | OUTPATIENT
Start: 2018-06-26 | End: 2018-06-26

## 2018-06-26 RX ORDER — KETOROLAC TROMETHAMINE 30 MG/ML
60 INJECTION, SOLUTION INTRAMUSCULAR; INTRAVENOUS ONCE
Status: COMPLETED | OUTPATIENT
Start: 2018-06-26 | End: 2018-06-26

## 2018-06-26 RX ORDER — CEFTRIAXONE SODIUM 1 G
1 VIAL (EA) INJECTION ONCE
Status: COMPLETED | OUTPATIENT
Start: 2018-06-26 | End: 2018-06-26

## 2018-06-26 RX ORDER — DOXYCYCLINE 100 MG/1
100 CAPSULE ORAL 2 TIMES DAILY
Qty: 14 CAPSULE | Refills: 0 | Status: SHIPPED | OUTPATIENT
Start: 2018-06-26 | End: 2019-02-01

## 2018-06-26 RX ADMIN — ACETAMINOPHEN 975 MG: 325 TABLET, FILM COATED ORAL at 19:18

## 2018-06-26 RX ADMIN — KETOROLAC TROMETHAMINE 60 MG: 30 INJECTION, SOLUTION INTRAMUSCULAR at 19:14

## 2018-06-26 RX ADMIN — CEFTRIAXONE 1 G: 1 INJECTION, POWDER, FOR SOLUTION INTRAMUSCULAR; INTRAVENOUS at 19:19

## 2018-06-26 ASSESSMENT — ENCOUNTER SYMPTOMS
ABDOMINAL PAIN: 0
COUGH: 1
NECK STIFFNESS: 0
FEVER: 1
PALPITATIONS: 0
NAUSEA: 0
CHEST TIGHTNESS: 0
SHORTNESS OF BREATH: 0
VOMITING: 0
SORE THROAT: 1
CHILLS: 1
NECK PAIN: 0
BACK PAIN: 0
STRIDOR: 0
NEUROLOGICAL NEGATIVE: 1
WHEEZING: 0

## 2018-06-26 NOTE — ED AVS SNAPSHOT
[facility]    [address] [address]    Phone:  [phone]                                       Gale Adams   MRN: [id number]    Department:  [facility]   Date of Visit:  6/26/2018           Patient Information     Date Of Birth          1990        Your diagnoses for this visit were:     Acute bronchitis with symptoms greater than 10 days        You were seen by Renetta Daniel PA-C.      Follow-up Information     Follow up with Danii Landa APRN CNP In 2 days.    Specialty:  Family Practice    Contact information:    [address]  [place] [address] [phone]          Follow up with [facility].    Specialty:  EMERGENCY MEDICINE    Why:  If symptoms worsen    Contact information:    [address]  [place] [address] [phone]    Additional information:    From [facility]: Take [address]. Turn left at [address]. Turn left at the first stoplight on [address] [place] [address]. At the first stop sign, take a right onto [address]. Take a left into the parking lot and continue through until you reach the North enterance of the building.       From [place]: Take [address]. Take the [address] ramp towards [place]. Turn left onto [address]. Take a slight right onto [address]. Turn left at the first stoplight on [address]. At the first stop sign, take a right onto [address]. Take a left into the parking lot and continue through until you reach the North enterance of the building.       From [place]: Take [address]. Take a right at [address]. At the first stop sign, take a right onto [address]. Take a left into the parking lot and continue through until you reach the North enterance of the building.         Discharge Instructions       Take the antibiotic as prescribed for your bronchitis/early pneumonia. Alternate between tylenol  and ibuprofen every 4 hours for fever control. I expect you to begin feeling better over the next couple days. Please return here with any new or worsening symptoms.   Acute Bronchitis  Your healthcare provider has told you that you have acute bronchitis. Bronchitis is infection or inflammation of the bronchial tubes (airways in the lungs). Normally, air moves easily in and out of the airways. Bronchitis narrows the airways, making it harder for air to flow in and out of the lungs. This causes symptoms such as shortness of breath, coughing up yellow or green mucus, and wheezing. Bronchitis can be acute or chronic. Acute means the condition comes on quickly and goes away in a short time, usually within 3 to 10 days. Chronic means a condition lasts a long time and often comes back.    What causes acute bronchitis?  Acute bronchitis almost always starts as a viral respiratory infection, such as a cold or the flu. Certain factors make it more likely for a cold or flu to turn into bronchitis. These include being very young, being elderly, having a heart or lung problem, or having a weak immune system. Cigarette smoking also makes bronchitis more likely.  When bronchitis develops, the airways become swollen. The airways may also become infected with bacteria. This is known as a secondary infection.  Diagnosing acute bronchitis  Your healthcare provider will examine you and ask about your symptoms and health history. You may also have a sputum culture to test the fluid in your lungs. Chest X-rays may be done to look for infection in the lungs.  Treating acute bronchitis  Bronchitis usually clears up as the cold or flu goes away. You can help feel better faster by doing the following:    Take medicine as directed. You may be told to take ibuprofen or other over-the-counter medicines. These help relieve inflammation in your bronchial tubes. Your healthcare provider may prescribe an inhaler to help open up the bronchial  tubes. Most of the time, acute bronchitis is caused by a viral infection. Antibiotics are usually not prescribed for viral infections.    Drink plenty of fluids, such as water, juice, or warm soup. Fluids loosen mucus so that you can cough it up. This helps you breathe more easily. Fluids also prevent dehydration.    Make sure you get plenty of rest.    Do not smoke. Do not allow anyone else to smoke in your home.  Recovery and follow-up  Follow up with your doctor as you are told. You will likely feel better in a week or two. But a dry cough can linger beyond that time. Let your doctor know if you still have symptoms (other than a dry cough) after 2 weeks, or if you re prone to getting bronchial infections. Take steps to protect yourself from future infections. These steps include stopping smoking and avoiding tobacco smoke, washing your hands often, and getting a yearly flu shot.  When to call your healthcare provider  Call the healthcare provider if you have any of the following:    Fever of 100.4 F (38.0 C) or higher, or as advised    Symptoms that get worse, or new symptoms    Trouble breathing    Symptoms that don t start to improve within a week, or within 3 days of taking antibiotics   Date Last Reviewed: 12/1/2016 2000-2017 [employer]. [address] [place] [address], [place] [address]. All rights reserved. This information is not intended as a substitute for professional medical care. Always follow your healthcare professional's instructions.          Your next 10 appointments already scheduled     Jun 27, 2018  7:40 PM CDT   (Arrive by 7:25 PM)   SHORT with DAMON Aden CNP   [facility] ([facility]    [address] [place] [address]   [phone]            Jun 29, 2018  1:30 PM CDT   (Arrive by 1:15 PM)   SHORT with Lam Bravo MD   [facility] ([facility] )    [address] [place] [address]    [phone]                 Review of your medicines      START taking        Dose / Directions Last dose taken    doxycycline 100 MG capsule   Commonly known as:  VIBRAMYCIN   Dose:  100 mg   Quantity:  14 capsule        Take 1 capsule (100 mg) by mouth 2 times daily for 7 days   Refills:  0          Our records show that you are taking the medicines listed below. If these are incorrect, please call your family doctor or clinic.        Dose / Directions Last dose taken    clomiPHENE 50 MG tablet   Commonly known as:  CLOMID   Dose:  200 mg   Quantity:  20 tablet        Take 4 tablets (200 mg) by mouth daily   Refills:  1        levothyroxine 100 MCG tablet   Commonly known as:  SYNTHROID/LEVOTHROID   Quantity:  30 tablet        TAKE 1 TABLET BY MOUTH DAILY   Refills:  5                Prescriptions were sent or printed at these locations (1 Prescription)                   [facility]  [place], [place] - [address]   [address], [place] [address]    Telephone:  [phone]   Fax:  [phone]   Hours:                  E-Prescribed (1 of 1)         doxycycline (VIBRAMYCIN) 100 MG capsule                Procedures and tests performed during your visit     Basic metabolic panel    CBC with platelets differential    CRP inflammation    Chest XR,  PA & LAT    D-Dimer ([facility])    EKG 12 lead    Erythrocyte sedimentation rate auto    Procalcitonin    Troponin I      Orders Needing Specimen Collection     None      Pending Results     Date and Time Order Name Status Description    6/26/2018 1749 Procalcitonin In process             Pending Culture Results     No orders found from 6/24/2018 to 6/27/2018.            Thank you for choosing [facility]       Thank you for choosing [place] [facility] for your care. Our goal is always to provide you with excellent care. Hearing back from our patients is one way we can continue to improve our services. Please take a few minutes to complete the written survey that you may receive  in the mail after you visit with us. Thank you!        Your Last ChanceharMobileAware Information     [email or web address] gives you secure access to your electronic health record. If you see a primary care provider, you can also send messages to your care team and make appointments. If you have questions, please call your primary care clinic.  If you do not have a primary care provider, please call [phone] and they will assist you.        Care EveryWhere ID     This is your Care EveryWhere ID. This could be used by other organizations to access your [place] medical records  [id number]        Equal Access to Services     VAN ROMAN : Dot Haney, brittany lawrence, valarie salguero, ramin leyva . So [facility] [phone].    ATENCIÓN: Si habla español, tiene a hewitt disposición servicios gratuitos de asistencia lingüística. Jazmineame al [phone].    We comply with applicable federal civil rights laws and [place] laws. We do not discriminate on the basis of race, color, national origin, age, disability, sex, sexual orientation, or gender identity.            After Visit Summary       This is your record. Keep this with you and show to your community pharmacist(s) and doctor(s) at your next visit.

## 2018-06-26 NOTE — ED NOTES
"In ED for \"fever this afternoon, headache, body tingling with aches and fast heart rate.  Has had a head congestion for a couple of weeks\"   "

## 2018-06-26 NOTE — TELEPHONE ENCOUNTER
12:58 PM    Reason for Call: OVERBOOK    Patient is having the following symptoms: hot/cold flashes, weak hands and legs for 3 weeks.    The patient is requesting an appointment for today or any time after 10 am tomorrow with Danii Landa.    Was an appointment offered for this call? No  If yes : Appointment type              Date    Preferred method for responding to this message: Telephone Call  What is your phone number ? [phone]    If we cannot reach you directly, may we leave a detailed response at the number you provided? Yes    Can this message wait until your PCP/provider returns, if unavailable today? Not applicable, provider is in today.     Thank you,     Sweetie Sparks

## 2018-06-26 NOTE — LETTER
June 26, 2018      To Whom It May Concern:      Gale Adams was seen in our Emergency Department today, 06/26/18. No work the next 2 days.     Sincerely,        Renetta Daniel PA-C

## 2018-06-26 NOTE — ED AVS SNAPSHOT
[facility]    [address] [address]    Phone:  [phone]                                       Gale Adams   MRN: [id number]    Department[facility]  [facility]   Date of Visit:  6/26/2018           After Visit Summary Signature Page     I have received my discharge instructions, and my questions have been answered. I have discussed any challenges I see with this plan with the nurse or doctor.    ..........................................................................................................................................  Patient/Patient Representative Signature      ..........................................................................................................................................  Patient Representative Print Name and Relationship to Patient    ..................................................               ................................................  Date                                            Time    ..........................................................................................................................................  Reviewed by Signature/Title    ...................................................              ..............................................  Date                                                            Time

## 2018-06-27 NOTE — DISCHARGE INSTRUCTIONS
Take the antibiotic as prescribed for your bronchitis/early pneumonia. Alternate between tylenol and ibuprofen every 4 hours for fever control. I expect you to begin feeling better over the next couple days. Please return here with any new or worsening symptoms.   Acute Bronchitis  Your healthcare provider has told you that you have acute bronchitis. Bronchitis is infection or inflammation of the bronchial tubes (airways in the lungs). Normally, air moves easily in and out of the airways. Bronchitis narrows the airways, making it harder for air to flow in and out of the lungs. This causes symptoms such as shortness of breath, coughing up yellow or green mucus, and wheezing. Bronchitis can be acute or chronic. Acute means the condition comes on quickly and goes away in a short time, usually within 3 to 10 days. Chronic means a condition lasts a long time and often comes back.    What causes acute bronchitis?  Acute bronchitis almost always starts as a viral respiratory infection, such as a cold or the flu. Certain factors make it more likely for a cold or flu to turn into bronchitis. These include being very young, being elderly, having a heart or lung problem, or having a weak immune system. Cigarette smoking also makes bronchitis more likely.  When bronchitis develops, the airways become swollen. The airways may also become infected with bacteria. This is known as a secondary infection.  Diagnosing acute bronchitis  Your healthcare provider will examine you and ask about your symptoms and health history. You may also have a sputum culture to test the fluid in your lungs. Chest X-rays may be done to look for infection in the lungs.  Treating acute bronchitis  Bronchitis usually clears up as the cold or flu goes away. You can help feel better faster by doing the following:    Take medicine as directed. You may be told to take ibuprofen or other over-the-counter medicines. These help relieve inflammation in your  bronchial tubes. Your healthcare provider may prescribe an inhaler to help open up the bronchial tubes. Most of the time, acute bronchitis is caused by a viral infection. Antibiotics are usually not prescribed for viral infections.    Drink plenty of fluids, such as water, juice, or warm soup. Fluids loosen mucus so that you can cough it up. This helps you breathe more easily. Fluids also prevent dehydration.    Make sure you get plenty of rest.    Do not smoke. Do not allow anyone else to smoke in your home.  Recovery and follow-up  Follow up with your doctor as you are told. You will likely feel better in a week or two. But a dry cough can linger beyond that time. Let your doctor know if you still have symptoms (other than a dry cough) after 2 weeks, or if you re prone to getting bronchial infections. Take steps to protect yourself from future infections. These steps include stopping smoking and avoiding tobacco smoke, washing your hands often, and getting a yearly flu shot.  When to call your healthcare provider  Call the healthcare provider if you have any of the following:    Fever of 100.4 F (38.0 C) or higher, or as advised    Symptoms that get worse, or new symptoms    Trouble breathing    Symptoms that don t start to improve within a week, or within 3 days of taking antibiotics   Date Last Reviewed: 12/1/2016 2000-2017 [employer]. [address] [place] [address], [place] [address]. All rights reserved. This information is not intended as a substitute for professional medical care. Always follow your healthcare professional's instructions.

## 2018-06-27 NOTE — ED PROVIDER NOTES
History     Chief Complaint   Patient presents with     Fever     HPI  Gale Adams is a 27 year old female who presents with nasal congestion, slight sore throat, and cough x 2 weeks with sudden development of fever today. She has intermittent left sided chest pain with radiation to the left shoulder as well. She took ibuprofen for her fever at 1330 today. Denies dyspnea.     Problem List:    Patient Active Problem List    Diagnosis Date Noted     Female infertility 12/06/2017     Priority: Medium       Possible male factor-  Decreased motility.  Urology referral  Dr. Quarles Repeat semen analysis when off alcohol x 3-6 months.  Consider IUI or him on Clomid if numbers not improved off alcohol.  Anovulation, clomid       Hypothyroidism, unspecified type 03/15/2017     Priority: Medium     ACP (advance care planning) 06/17/2016     Priority: Medium     Advance Care Planning 6/17/2016: ACP Review of Chart / Resources Provided:  Reviewed chart for advance care plan.  Gale Adams has no plan or code status on file. Discussed available resources and provided with information. Confirmed code status reflects current choices pending further ACP discussions.  Confirmed/documented legally designated decision makers.  Added by Abi Llamas             Left knee injury 06/12/2015     Priority: Medium     Swelling, mass, or lump in head and neck 11/21/2013     Priority: Medium        Past Medical History:    Past Medical History:   Diagnosis Date     Acute upper respiratory infections of unspecified site 01/03/2001     Need for prophylactic vaccination and inoculation against other specified disease 03/05/2003       Past Surgical History:    Past Surgical History:   Procedure Laterality Date     CARDIAC SURGERY      tachacardia in the past     SOFT TISSUE SURGERY      wisdom teeth extraction       Family History:    Family History   Problem Relation Age of Onset     Cerebrovascular Disease Maternal Grandmother       CVA     Diabetes Maternal Grandmother      Breast Cancer Maternal Grandmother      Hypertension Father      Hyperlipidemia Mother      Depression Mother      Obesity Mother      Other Cancer Paternal Grandmother      Coronary Artery Disease No family hx of      Colon Cancer No family hx of      Prostate Cancer No family hx of      Anxiety Disorder No family hx of      Mental Illness No family hx of      Substance Abuse No family hx of      Anesthesia Reaction No family hx of      Asthma No family hx of      Osteoperosis No family hx of      Genetic Disorder No family hx of      Thyroid Disease No family hx of        Social History:  Marital Status:   [2]  Social History   Substance Use Topics     Smoking status: Never Smoker     Smokeless tobacco: Never Used     Alcohol use No        Medications:      clomiPHENE (CLOMID) 50 MG tablet   doxycycline (VIBRAMYCIN) 100 MG capsule   levothyroxine (SYNTHROID/LEVOTHROID) 100 MCG tablet         Review of Systems   Constitutional: Positive for chills and fever.   HENT: Positive for congestion and sore throat.    Respiratory: Positive for cough. Negative for chest tightness, shortness of breath, wheezing and stridor.    Cardiovascular: Positive for chest pain. Negative for palpitations and leg swelling.   Gastrointestinal: Negative for abdominal pain, nausea and vomiting.   Genitourinary: Negative.    Musculoskeletal: Negative for back pain, neck pain and neck stiffness.   Skin: Negative.    Neurological: Negative.    All other systems reviewed and are negative.      Physical Exam   BP: 132/73  Pulse: 101  Heart Rate: 96  Temp: 101.2  F (38.4  C)  Resp: 20  SpO2: 99 %      Physical Exam   Constitutional: She is oriented to person, place, and time. She appears well-developed and well-nourished.  Non-toxic appearance. She does not have a sickly appearance. She appears ill. No distress.   HENT:   Head: Normocephalic and atraumatic.   Right Ear: External ear normal.   Left  Ear: External ear normal.   Nose: Nose normal.   Mouth/Throat: Oropharynx is clear and moist. No oropharyngeal exudate.   Eyes: Conjunctivae and EOM are normal. Pupils are equal, round, and reactive to light. Right eye exhibits no discharge. Left eye exhibits no discharge. No scleral icterus.   Neck: Normal range of motion. Neck supple.   Cardiovascular: Regular rhythm, normal heart sounds and intact distal pulses.  Tachycardia present.  Exam reveals no gallop and no friction rub.    No murmur heard.  Pulmonary/Chest: Effort normal and breath sounds normal. No respiratory distress. She has no wheezes. She has no rales. She exhibits tenderness (Left sided. ).   Abdominal: Soft. Bowel sounds are normal. There is no tenderness.   Musculoskeletal: She exhibits no edema.   Lymphadenopathy:     She has no cervical adenopathy.   Neurological: She is alert and oriented to person, place, and time. No cranial nerve deficit. Coordination normal.   Skin: Skin is warm and dry. No rash noted. She is not diaphoretic. No erythema. No pallor.   Psychiatric: She has a normal mood and affect. Her behavior is normal. Judgment and thought content normal.   Nursing note and vitals reviewed.      ED Course     ED Course     Procedures               Results for orders placed or performed during the hospital encounter of 06/26/18 (from the past 24 hour(s))   Chest XR,  PA & LAT    Narrative    XR CHEST 2 VW    HISTORY: 27 years Female cough, fever;     COMPARISON: 11/4/2017    TECHNIQUE: 2 views of the chest were obtained.    FINDINGS: Two views of the chest were obtained. Heart size and  pulmonary vascularity are within normal limits, lungs are clear both  views. No consolidating air space opacities are present.          Impression    IMPRESSION: Clear chest.    GHAZAL VELASQUEZ MD   CBC with platelets differential   Result Value Ref Range    WBC 12.0 (H) 4.0 - 11.0 10e9/L    RBC Count 4.36 3.8 - 5.2 10e12/L    Hemoglobin 12.9 11.7 - 15.7  g/dL    Hematocrit 37.5 35.0 - 47.0 %    MCV 86 78 - 100 fl    MCH 29.6 26.5 - 33.0 pg    MCHC 34.4 31.5 - 36.5 g/dL    RDW 12.9 10.0 - 15.0 %    Platelet Count 240 150 - 450 10e9/L    Diff Method Automated Method     % Neutrophils 82.7 %    % Lymphocytes 9.8 %    % Monocytes 6.2 %    % Eosinophils 0.8 %    % Basophils 0.3 %    % Immature Granulocytes 0.2 %    Nucleated RBCs 0 0 /100    Absolute Neutrophil 9.9 (H) 1.6 - 8.3 10e9/L    Absolute Lymphocytes 1.2 0.8 - 5.3 10e9/L    Absolute Monocytes 0.7 0.0 - 1.3 10e9/L    Absolute Eosinophils 0.1 0.0 - 0.7 10e9/L    Absolute Basophils 0.0 0.0 - 0.2 10e9/L    Abs Immature Granulocytes 0.0 0 - 0.4 10e9/L    Absolute Nucleated RBC 0.0    Procalcitonin   Result Value Ref Range    Procalcitonin 0.05 ng/ml   Basic metabolic panel   Result Value Ref Range    Sodium 137 133 - 144 mmol/L    Potassium 4.0 3.4 - 5.3 mmol/L    Chloride 103 94 - 109 mmol/L    Carbon Dioxide 26 20 - 32 mmol/L    Anion Gap 8 3 - 14 mmol/L    Glucose 102 (H) 70 - 99 mg/dL    Urea Nitrogen 12 7 - 30 mg/dL    Creatinine 0.68 0.52 - 1.04 mg/dL    GFR Estimate >90 >60 mL/min/1.7m2    GFR Estimate If Black >90 >60 mL/min/1.7m2    Calcium 9.2 8.5 - 10.1 mg/dL   CRP inflammation   Result Value Ref Range    CRP Inflammation 15.8 (H) 0.0 - 8.0 mg/L   Erythrocyte sedimentation rate auto   Result Value Ref Range    Sed Rate 16 0 - 20 mm/h   Troponin I   Result Value Ref Range    Troponin I ES <0.015 0.000 - 0.045 ug/L   D-Dimer ([facility])   Result Value Ref Range    D-Dimer ng/mL <200 0 - 300 ng/ml D-DU       Medications   acetaminophen (TYLENOL) tablet 975 mg (975 mg Oral Given 6/26/18 1918)   ketorolac (TORADOL) injection 60 mg (60 mg Intramuscular Given 6/26/18 1914)   cefTRIAXone (ROCEPHIN) injection 1 g (1 g Intramuscular Given 6/26/18 1919)   EKG: NSR without acute ST-T changes.     Assessments & Plan (with Medical Decision Making)   Gale presents with URI symptoms x 2 weeks with fever that began today.  VS reveal fever of 102.9, tachycardic at 122, normotensive at 132/73. She was given Tylenol 975mg PO and Toradol 60mg IM with fever improving to 98 following and left sided chest pain improved. EKG, D-dimer, troponin, and ESR WNL making myocarditis/pericarditis unlikely. CXR negative for pneumonia, but I suspect early pneumonia not visible on XR vs acute bronchitis with symptoms > 10 days. Will start her on Doxycycline and Rocephin 1g IM injection given in ED. She was discharged home in improved condition following.     Plan: Take the antibiotic as prescribed for your bronchitis/early pneumonia. Alternate between tylenol and ibuprofen every 4 hours for fever control. I expect you to begin feeling better over the next couple days. Please return here with any new or worsening symptoms.     I have reviewed the nursing notes.    I have reviewed the findings, diagnosis, plan and need for follow up with the patient.    Discharge Medication List as of 6/26/2018  7:48 PM      START taking these medications    Details   doxycycline (VIBRAMYCIN) 100 MG capsule Take 1 capsule (100 mg) by mouth 2 times daily for 7 days, Disp-14 capsule, R-0, E-Prescribe             Final diagnoses:   Acute bronchitis with symptoms greater than 10 days       6/26/2018   [facility]     Renetta Daniel PA-C  06/26/18 [id number]

## 2018-06-29 ENCOUNTER — OFFICE VISIT (OUTPATIENT)
Dept: OBGYN | Facility: OTHER | Age: 28
End: 2018-06-29
Attending: OBSTETRICS & GYNECOLOGY
Payer: COMMERCIAL

## 2018-06-29 VITALS
WEIGHT: 204 LBS | HEIGHT: 66 IN | SYSTOLIC BLOOD PRESSURE: 110 MMHG | BODY MASS INDEX: 32.78 KG/M2 | HEART RATE: 80 BPM | DIASTOLIC BLOOD PRESSURE: 62 MMHG

## 2018-06-29 DIAGNOSIS — N97.9 FEMALE INFERTILITY: ICD-10-CM

## 2018-06-29 PROCEDURE — 99213 OFFICE O/P EST LOW 20 MIN: CPT | Performed by: OBSTETRICS & GYNECOLOGY

## 2018-06-29 RX ORDER — PNV NO.118/IRON FUMARATE/FA 29 MG-1 MG
1 TABLET,CHEWABLE ORAL DAILY
COMMUNITY

## 2018-06-29 RX ORDER — LETROZOLE 2.5 MG/1
2.5 TABLET, FILM COATED ORAL DAILY
Qty: 5 TABLET | Refills: 1 | Status: SHIPPED | OUTPATIENT
Start: 2018-06-29 | End: 2018-08-14

## 2018-06-29 ASSESSMENT — PAIN SCALES - GENERAL: PAINLEVEL: NO PAIN (0)

## 2018-06-29 NOTE — NURSING NOTE
"Chief Complaint   Patient presents with     RECHECK     Follow up infertility       Initial /62 (BP Location: Left arm, Patient Position: Sitting, Cuff Size: Adult Large)  Pulse 80  Ht 5' 6\" (1.676 m)  Wt 204 lb (92.5 kg)  BMI 32.93 kg/m2 Estimated body mass index is 32.93 kg/(m^2) as calculated from the following:    Height as of this encounter: 5' 6\" (1.676 m).    Weight as of this encounter: 204 lb (92.5 kg).  Medication Reconciliation: complete    RANCHO ORR LPN    "

## 2018-06-29 NOTE — PROGRESS NOTES
SUBJECTIVE:                                                    Gale Adams is a 27 year old female who presents to clinic today for the following health issues:        Fever       Duration: 6 days    Description (location/character/radiation): Chest ,headcold    Intensity:  moderate    Accompanying signs and symptoms: Fever and chills, cough, body aches, head pain    History (similar episodes/previous evaluation): None    Precipitating or alleviating factors: None    Therapies tried and outcome: Currently on doxycycline 100MG caps x2 daily for 7 days       ED/UC Followup:    Facility:  [facility]  Date of visit: 6/26/2018  Reason for visit: Fever  Current Status: Is improved.         Problem list and histories reviewed & adjusted, as indicated.  Additional history: none    Patient Active Problem List   Diagnosis     Swelling, mass, or lump in head and neck     Left knee injury     ACP (advance care planning)     Hypothyroidism, unspecified type     Female infertility     Past Surgical History:   Procedure Laterality Date     CARDIAC SURGERY      tachacardia in the past     SOFT TISSUE SURGERY      wisdom teeth extraction       Social History   Substance Use Topics     Smoking status: Never Smoker     Smokeless tobacco: Never Used     Alcohol use No     Family History   Problem Relation Age of Onset     Cerebrovascular Disease Maternal Grandmother      CVA     Diabetes Maternal Grandmother      Breast Cancer Maternal Grandmother      Hypertension Father      Hyperlipidemia Mother      Depression Mother      Obesity Mother      Other Cancer Paternal Grandmother      Coronary Artery Disease No family hx of      Colon Cancer No family hx of      Prostate Cancer No family hx of      Anxiety Disorder No family hx of      Mental Illness No family hx of      Substance Abuse No family hx of      Anesthesia Reaction No family hx of      Asthma No family hx of      Osteoperosis No family hx of      Genetic  "Disorder No family hx of      Thyroid Disease No family hx of          Current Outpatient Prescriptions   Medication Sig Dispense Refill     doxycycline (VIBRAMYCIN) 100 MG capsule Take 1 capsule (100 mg) by mouth 2 times daily for 7 days 14 capsule 0     letrozole (FEMARA) 2.5 MG tablet Take 1 tablet (2.5 mg) by mouth daily Day 3-7 of menstrual cycle. 5 tablet 1     levothyroxine (SYNTHROID/LEVOTHROID) 100 MCG tablet TAKE 1 TABLET BY MOUTH DAILY 30 tablet 5     Prenatal Vit-Fe Fumarate-FA (PRENATAL 19) CHEW Take 1 chew tab by mouth daily       Allergies   Allergen Reactions     Amoxicillin Rash       ROS:  CONSTITUTIONAL:NEGATIVE for fever, chills,  POSITIVE  for fatigue  INTEGUMENTARY/SKIN: NEGATIVE for worrisome rashes, moles or lesions  ENT/MOUTH: NEGATIVE for ear, mouth and throat problems  RESP: NEGATIVE for significant cough or SOB  CV: NEGATIVE for chest pain, palpitations or peripheral edema    OBJECTIVE:     /68  Pulse 70  Temp 97.9  F (36.6  C) (Tympanic)  Ht 5' 6\" (1.676 m)  Wt 198 lb 12.8 oz (90.2 kg)  SpO2 98%  BMI 32.09 kg/m2  Body mass index is 32.09 kg/(m^2).   GENERAL: alert and no distress  NECK: no adenopathy, no asymmetry, masses, or scars and thyroid normal to palpation  RESP: lungs clear to auscultation - no rales, rhonchi or wheezes  CV: regular rate and rhythm, normal S1 S2, no S3 or S4, no murmur, click or rub, no peripheral edema and peripheral pulses strong  ABDOMEN: soft, nontender, no hepatosplenomegaly, no masses and bowel sounds normal  SKIN: no suspicious lesions or rashes  PSYCH: mentation appears normal, affect normal/bright    Diagnostic Test Results:  Results for orders placed or performed during the hospital encounter of 06/26/18   Chest XR,  PA & LAT    Narrative    XR CHEST 2 VW    HISTORY: 27 years Female cough, fever;     COMPARISON: 11/4/2017    TECHNIQUE: 2 views of the chest were obtained.    FINDINGS: Two views of the chest were obtained. Heart size " and  pulmonary vascularity are within normal limits, lungs are clear both  views. No consolidating air space opacities are present.          Impression    IMPRESSION: Clear chest.    GHAZAL VELASQUEZ MD   CBC with platelets differential   Result Value Ref Range    WBC 12.0 (H) 4.0 - 11.0 10e9/L    RBC Count 4.36 3.8 - 5.2 10e12/L    Hemoglobin 12.9 11.7 - 15.7 g/dL    Hematocrit 37.5 35.0 - 47.0 %    MCV 86 78 - 100 fl    MCH 29.6 26.5 - 33.0 pg    MCHC 34.4 31.5 - 36.5 g/dL    RDW 12.9 10.0 - 15.0 %    Platelet Count 240 150 - 450 10e9/L    Diff Method Automated Method     % Neutrophils 82.7 %    % Lymphocytes 9.8 %    % Monocytes 6.2 %    % Eosinophils 0.8 %    % Basophils 0.3 %    % Immature Granulocytes 0.2 %    Nucleated RBCs 0 0 /100    Absolute Neutrophil 9.9 (H) 1.6 - 8.3 10e9/L    Absolute Lymphocytes 1.2 0.8 - 5.3 10e9/L    Absolute Monocytes 0.7 0.0 - 1.3 10e9/L    Absolute Eosinophils 0.1 0.0 - 0.7 10e9/L    Absolute Basophils 0.0 0.0 - 0.2 10e9/L    Abs Immature Granulocytes 0.0 0 - 0.4 10e9/L    Absolute Nucleated RBC 0.0    Procalcitonin   Result Value Ref Range    Procalcitonin 0.05 ng/ml   Basic metabolic panel   Result Value Ref Range    Sodium 137 133 - 144 mmol/L    Potassium 4.0 3.4 - 5.3 mmol/L    Chloride 103 94 - 109 mmol/L    Carbon Dioxide 26 20 - 32 mmol/L    Anion Gap 8 3 - 14 mmol/L    Glucose 102 (H) 70 - 99 mg/dL    Urea Nitrogen 12 7 - 30 mg/dL    Creatinine 0.68 0.52 - 1.04 mg/dL    GFR Estimate >90 >60 mL/min/1.7m2    GFR Estimate If Black >90 >60 mL/min/1.7m2    Calcium 9.2 8.5 - 10.1 mg/dL   CRP inflammation   Result Value Ref Range    CRP Inflammation 15.8 (H) 0.0 - 8.0 mg/L   Erythrocyte sedimentation rate auto   Result Value Ref Range    Sed Rate 16 0 - 20 mm/h   Troponin I   Result Value Ref Range    Troponin I ES <0.015 0.000 - 0.045 ug/L   D-Dimer ([facility])   Result Value Ref Range    D-Dimer ng/mL <200 0 - 300 ng/ml D-DU       ASSESSMENT/PLAN:     1. Acute bronchitis with  symptoms > 10 days  Plan to complete current therapy.  Discussed continued fatigue and encouraged resting when she needs to and to stay hydrated. Gale should follow up if symptoms worsen again.           See Patient Instructions    DAMON Soler [facility]

## 2018-06-29 NOTE — PROGRESS NOTES
S:  F/u infertility.  See my prior evals.  Nml w/u except anovulation.  Possible male factor but  had f/u with urology recently and told all was nml with SA.  We have not been able to achieve ovulation with clomid.           Patient Active Problem List   Diagnosis     Swelling, mass, or lump in head and neck     Left knee injury     ACP (advance care planning)     Hypothyroidism, unspecified type     Female infertility            Past Medical History:   Diagnosis Date     Acute upper respiratory infections of unspecified site 01/03/2001     Need for prophylactic vaccination and inoculation against other specified disease 03/05/2003            Past Surgical History:   Procedure Laterality Date     CARDIAC SURGERY      tachacardia in the past     SOFT TISSUE SURGERY      wisdom teeth extraction            Social History   Substance Use Topics     Smoking status: Never Smoker     Smokeless tobacco: Never Used     Alcohol use No            Family History   Problem Relation Age of Onset     Cerebrovascular Disease Maternal Grandmother      CVA     Diabetes Maternal Grandmother      Breast Cancer Maternal Grandmother      Hypertension Father      Hyperlipidemia Mother      Depression Mother      Obesity Mother      Other Cancer Paternal Grandmother      Coronary Artery Disease No family hx of      Colon Cancer No family hx of      Prostate Cancer No family hx of      Anxiety Disorder No family hx of      Mental Illness No family hx of      Substance Abuse No family hx of      Anesthesia Reaction No family hx of      Asthma No family hx of      Osteoperosis No family hx of      Genetic Disorder No family hx of      Thyroid Disease No family hx of                Allergies   Allergen Reactions     Amoxicillin Rash            Current Outpatient Prescriptions   Medication Sig Dispense Refill     clomiPHENE (CLOMID) 50 MG tablet Take 4 tablets (200 mg) by mouth daily 20 tablet 1     doxycycline (VIBRAMYCIN) 100 MG  "capsule Take 1 capsule (100 mg) by mouth 2 times daily for 7 days 14 capsule 0     levothyroxine (SYNTHROID/LEVOTHROID) 100 MCG tablet TAKE 1 TABLET BY MOUTH DAILY 30 tablet 5     Prenatal Vit-Fe Fumarate-FA (PRENATAL 19) CHEW Take 1 chew tab by mouth daily            Review Of Systems  Constitutional:  Denies fever  GI/ negative except as noted per hpi    O:   /62 (BP Location: Left arm, Patient Position: Sitting, Cuff Size: Adult Large)  Pulse 80  Ht 5' 6\" (1.676 m)  Wt 204 lb (92.5 kg)  BMI 32.93 kg/m2  Gen:  NAD, A and O         A: Infertility with chronic anovulation and unsuccessful Clomid therapy    P: Discussed options of changing medication to other ovulation induction agent such as Letrozole or referral for IUI with  ovulation induction/ follicle monitoring and HCG.  Pt desires to try the Letrazole first for a few months and if no success consider referral.   R/B SE's discussed.  Day 21 progesterone ordered to assess ovulation.   Pt has my card and phone number to call as needed if problems in the interim or she does not here her results.   15 minutes were spent with the patient with greater than 50% of the visit spent in face-to-face counseling and coordination of care.        "

## 2018-06-29 NOTE — MR AVS SNAPSHOT
After Visit Summary   6/29/2018    Gale Adams    MRN: [id number]           Patient Information     Date Of Birth          1990        Visit Information        Provider Department      6/29/2018 1:30 PM Lam Bravo MD [facility]        Today's Diagnoses     Female infertility          Care Instructions     Pt has my card and phone number to call as needed if problems in the interim or she does not here her results.             Follow-ups after your visit        Your next 10 appointments already scheduled     Jul 02, 2018 10:20 AM CDT   (Arrive by 10:05 AM)   SHORT with DAMON Aden CNP   [facility] ([facility] )    [address]  [facility] [address]   [phone]              Future tests that were ordered for you today     Open Future Orders        Priority Expected Expires Ordered    Progesterone Routine 6/29/2018 7/31/2018 6/29/2018            Who to contact     If you have questions or need follow up information about today's clinic visit or your schedule please contact [facility] directly at [phone].  Normal or non-critical lab and imaging results will be communicated to you by CryoLifehart, letter or phone within 4 business days after the clinic has received the results. If you do not hear from us within 7 days, please contact the clinic through CryoLifehart or phone. If you have a critical or abnormal lab result, we will notify you by phone as soon as possible.  Submit refill requests through Adonit or call your pharmacy and they will forward the refill request to us. Please allow 3 business days for your refill to be completed.          Additional Information About Your Visit        CryoLifehart Information     Adonit gives you secure access to your electronic health record. If you see a primary care provider, you can also send messages to your care team and make appointments. If you have questions, please  "call your primary care clinic.  If you do not have a primary care provider, please call [phone] and they will assist you.        Care EveryWhere ID     This is your Care EveryWhere ID. This could be used by other organizations to access your [place] medical records  [id number]        Your Vitals Were     Pulse Height BMI (Body Mass Index)             80 5' 6\" (1.676 m) 32.93 kg/m2          Blood Pressure from Last 3 Encounters:   06/29/18 110/62   06/26/18 120/81   02/22/18 112/76    Weight from Last 3 Encounters:   06/29/18 204 lb (92.5 kg)   02/22/18 185 lb (83.9 kg)   10/31/17 185 lb (83.9 kg)                 Today's Medication Changes          These changes are accurate as of 6/29/18  2:15 PM.  If you have any questions, ask your nurse or doctor.               Start taking these medicines.        Dose/Directions    letrozole 2.5 MG tablet   Commonly known as:  FEMARA   Used for:  Female infertility   Started by:  Lam Bravo MD        Dose:  2.5 mg   Take 1 tablet (2.5 mg) by mouth daily Day 3-7 of menstrual cycle.   Quantity:  5 tablet   Refills:  1            Where to get your medicines      These medications were sent to [facility] [place]  [address]  [address] [place] [address]     Phone:  [phone]     letrozole 2.5 MG tablet                Primary Care Provider Office Phone # Fax #    Danii DAMON Robb [facility] [phone]       [address] [place] [address]        Equal Access to Services     [facility] AH: Hadmumtaz Haney, waaxda luqadaha, qaybta kaalramin santos. So [facility] [phone].    ATENCIÓN: Si habla español, tiene a hewitt disposición servicios gratuitos de asistencia lingüística. Llame al [phone].    We comply with applicable federal civil rights laws and [place] laws. We do not discriminate on the basis of race, color, national origin, age, disability, sex, " sexual orientation, or gender identity.            Thank you!     Thank you for choosing [facility]  [facility]. Our goal is always to provide you with excellent care. Hearing back from our patients is one way we can continue to improve our services. Please take a few minutes to complete the written survey that you may receive in the mail after your visit with us. Thank you!             Your Updated Medication List - Protect others around you: Learn how to safely use, store and throw away your medicines at www.disposemymeds.org.          This list is accurate as of 6/29/18  2:15 PM.  Always use your most recent med list.                   Brand Name Dispense Instructions for use Diagnosis    clomiPHENE 50 MG tablet    CLOMID    20 tablet    Take 4 tablets (200 mg) by mouth daily    Female infertility       doxycycline 100 MG capsule    VIBRAMYCIN    14 capsule    Take 1 capsule (100 mg) by mouth 2 times daily for 7 days        letrozole 2.5 MG tablet    FEMARA    5 tablet    Take 1 tablet (2.5 mg) by mouth daily Day 3-7 of menstrual cycle.    Female infertility       levothyroxine 100 MCG tablet    SYNTHROID/LEVOTHROID    30 tablet    TAKE 1 TABLET BY MOUTH DAILY    Hypothyroidism, unspecified type       PRENATAL 19 Chew      Take 1 chew tab by mouth daily

## 2018-06-29 NOTE — PATIENT INSTRUCTIONS
Pt has my card and phone number to call as needed if problems in the interim or she does not here her results.

## 2018-07-02 ENCOUNTER — OFFICE VISIT (OUTPATIENT)
Dept: FAMILY MEDICINE | Facility: OTHER | Age: 28
End: 2018-07-02
Attending: NURSE PRACTITIONER
Payer: COMMERCIAL

## 2018-07-02 VITALS
DIASTOLIC BLOOD PRESSURE: 68 MMHG | OXYGEN SATURATION: 98 % | WEIGHT: 198.8 LBS | HEIGHT: 66 IN | BODY MASS INDEX: 31.95 KG/M2 | TEMPERATURE: 97.9 F | HEART RATE: 70 BPM | SYSTOLIC BLOOD PRESSURE: 112 MMHG

## 2018-07-02 DIAGNOSIS — J20.9 ACUTE BRONCHITIS WITH SYMPTOMS > 10 DAYS: Primary | ICD-10-CM

## 2018-07-02 PROCEDURE — 99213 OFFICE O/P EST LOW 20 MIN: CPT | Performed by: NURSE PRACTITIONER

## 2018-07-02 ASSESSMENT — PAIN SCALES - GENERAL: PAINLEVEL: NO PAIN (0)

## 2018-07-02 ASSESSMENT — ANXIETY QUESTIONNAIRES
GAD7 TOTAL SCORE: 0
3. WORRYING TOO MUCH ABOUT DIFFERENT THINGS: NOT AT ALL
5. BEING SO RESTLESS THAT IT IS HARD TO SIT STILL: NOT AT ALL
1. FEELING NERVOUS, ANXIOUS, OR ON EDGE: NOT AT ALL
2. NOT BEING ABLE TO STOP OR CONTROL WORRYING: NOT AT ALL
7. FEELING AFRAID AS IF SOMETHING AWFUL MIGHT HAPPEN: NOT AT ALL
6. BECOMING EASILY ANNOYED OR IRRITABLE: NOT AT ALL

## 2018-07-02 ASSESSMENT — PATIENT HEALTH QUESTIONNAIRE - PHQ9: 5. POOR APPETITE OR OVEREATING: NOT AT ALL

## 2018-07-02 NOTE — MR AVS SNAPSHOT
After Visit Summary   7/2/2018    Gale Adams    MRN: [id number]           Patient Information     Date Of Birth          1990        Visit Information        Provider Department      7/2/2018 10:20 AM Danii Landa APRN [facility]        Care Instructions      Pneumonia (Adult)  Pneumonia is an infection deep within the lungs. It is in the small air sacs (alveoli). Pneumonia may be caused by a virus or bacteria. Pneumonia caused by bacteria is usually treated with an antibiotic. Severe cases may need to be treated in the hospital. Milder cases can be treated at home. Symptoms usually start to get better during the first 2 days of treatment.    Home care  Follow these guidelines when caring for yourself at home:    Rest at home for the first 2 to 3 days, or until you feel stronger. Don t let yourself get overly tired when you go back to your activities.    Stay away from cigarette smoke - yours or other people s.    You may use acetaminophen or ibuprofen to control fever or pain, unless another medicine was prescribed. If you have chronic liver or kidney disease, talk with your healthcare provider before using these medicines. Also talk with your provider if you ve had a stomach ulcer or gastrointestinal bleeding. Don t give aspirin to anyone younger than 18 years of age who is ill with a fever. It may cause severe liver damage.    Your appetite may be poor, so a light diet is fine.    Drink 6 to 8 glasses of fluids every day to make sure you are getting enough fluids. Beverages can include water, sport drinks, sodas without caffeine, juices, tea, or soup. Fluids will help loosen secretions in the lung. This will make it easier for you to cough up the phlegm (sputum). If you also have heart or kidney disease, check with your healthcare provider before you drink extra fluids.    Take antibiotic medicine prescribed until it is all gone, even if you are feeling  better after a few days.  Follow-up care  Follow up with your healthcare provider in the next 2 to 3 days, or as advised. This is to be sure the medicine is helping you get better.  If you are 65 or older, you should get a pneumococcal vaccine and a yearly flu (influenza) shot. You should also get these vaccines if you have chronic lung disease like asthma, emphysema, or COPD. Recently, a second type of pneumonia vaccine has become available for everyone over 65 years old. This is in addition to the previous vaccine. Ask your provider about this.  When to seek medical advice  Call your healthcare provider right away if any of these occur:    You don t get better within the first 48 hours of treatment    Shortness of breath gets worse    Rapid breathing (more than 25 breaths per minute)    Coughing up blood    Chest pain gets worse with breathing    Fever of 100.4 F (38 C) or higher that doesn t get better with fever medicine    Weakness, dizziness, or fainting that gets worse    Thirst or dry mouth that gets worse    Sinus pain, headache, or a stiff neck    Chest pain not caused by coughing  Date Last Reviewed: 1/1/2017 2000-2017 The [employer]. [address] [place] [address]. All rights reserved. This information is not intended as a substitute for professional medical care. Always follow your healthcare professional's instructions.                Follow-ups after your visit        Follow-up notes from your care team     Return if symptoms worsen or fail to improve.      Who to contact     If you have questions or need follow up information about today's clinic visit or your schedule please contact [facility] directly at [phone].  Normal or non-critical lab and imaging results will be communicated to you by MyChart, letter or phone within 4 business days after the clinic has received the results. If you do not hear from us within 7 days, please contact the clinic through  "MyChart or phone. If you have a critical or abnormal lab result, we will notify you by phone as soon as possible.  Submit refill requests through IMASTE or call your pharmacy and they will forward the refill request to us. Please allow 3 business days for your refill to be completed.          Additional Information About Your Visit        Bulldog Solutionshart Information     IMASTE gives you secure access to your electronic health record. If you see a primary care provider, you can also send messages to your care team and make appointments. If you have questions, please call your primary care clinic.  If you do not have a primary care provider, please call [phone] and they will assist you.        Care EveryWhere ID     This is your Care EveryWhere ID. This could be used by other organizations to access your [place] medical records  [id number]        Your Vitals Were     Pulse Temperature Height Pulse Oximetry BMI (Body Mass Index)       70 97.9  F (36.6  C) (Tympanic) 5' 6\" (1.676 m) 98% 32.09 kg/m2        Blood Pressure from Last 3 Encounters:   07/02/18 112/68   06/29/18 110/62   06/26/18 120/81    Weight from Last 3 Encounters:   07/02/18 198 lb 12.8 oz (90.2 kg)   06/29/18 204 lb (92.5 kg)   02/22/18 185 lb (83.9 kg)              Today, you had the following     No orders found for display         Today's Medication Changes          These changes are accurate as of 7/2/18 10:38 AM.  If you have any questions, ask your nurse or doctor.               Stop taking these medicines if you haven't already. Please contact your care team if you have questions.     clomiPHENE 50 MG tablet   Commonly known as:  CLOMID   Stopped by:  Danii Landa APRN CNP                    Primary Care Provider Office Phone # Fax #    DAMON Aden [phone] [address] [place] [address]        Equal Access to Services     CHARLIE ROMAN AH: Hadii brittany Hernandez " valarie lawrencemafernando ashtonramin bravo. So [facility] [phone].    ATENCIÓN: Si misa trinidad, tiene a hewitt disposición servicios gratuitos de asistencia lingüística. Nuno al [phone].    We comply with applicable federal civil rights laws and [place] laws. We do not discriminate on the basis of race, color, national origin, age, disability, sex, sexual orientation, or gender identity.            Thank you!     Thank you for choosing [facility]  for your care. Our goal is always to provide you with excellent care. Hearing back from our patients is one way we can continue to improve our services. Please take a few minutes to complete the written survey that you may receive in the mail after your visit with us. Thank you!             Your Updated Medication List - Protect others around you: Learn how to safely use, store and throw away your medicines at www.disposemymeds.org.          This list is accurate as of 7/2/18 10:38 AM.  Always use your most recent med list.                   Brand Name Dispense Instructions for use Diagnosis    doxycycline 100 MG capsule    VIBRAMYCIN    14 capsule    Take 1 capsule (100 mg) by mouth 2 times daily for 7 days        letrozole 2.5 MG tablet    FEMARA    5 tablet    Take 1 tablet (2.5 mg) by mouth daily Day 3-7 of menstrual cycle.    Female infertility       levothyroxine 100 MCG tablet    SYNTHROID/LEVOTHROID    30 tablet    TAKE 1 TABLET BY MOUTH DAILY    Hypothyroidism, unspecified type       PRENATAL 19 Chew      Take 1 chew tab by mouth daily

## 2018-07-02 NOTE — NURSING NOTE
"Chief Complaint   Patient presents with     Fever       Initial /68  Pulse 70  Temp 97.9  F (36.6  C) (Tympanic)  Ht 5' 6\" (1.676 m)  Wt 198 lb 12.8 oz (90.2 kg)  SpO2 98%  BMI 32.09 kg/m2 Estimated body mass index is 32.09 kg/(m^2) as calculated from the following:    Height as of this encounter: 5' 6\" (1.676 m).    Weight as of this encounter: 198 lb 12.8 oz (90.2 kg).  Medication Reconciliation: complete    Muna Ochoa LPN  "

## 2018-07-02 NOTE — PATIENT INSTRUCTIONS

## 2018-07-03 ASSESSMENT — ANXIETY QUESTIONNAIRES: GAD7 TOTAL SCORE: 0

## 2018-07-03 ASSESSMENT — PATIENT HEALTH QUESTIONNAIRE - PHQ9: SUM OF ALL RESPONSES TO PHQ QUESTIONS 1-9: 0

## 2018-07-24 NOTE — PROGRESS NOTES
SUBJECTIVE:   Gale Adams is a 27 year old female who presents to clinic today for the following health issues:      Hypothyroidism Follow-up      Since last visit, patient describes the following symptoms: Weight stable, no hair loss, no skin changes, no constipation, no loose stools      Amount of exercise or physical activity: 6-7 days/week for an average of 30-45 minutes    Problems taking medications regularly: No    Medication side effects: none    Diet: regular (no restrictions)            Problem list and histories reviewed & adjusted, as indicated.  Additional history: as documented    Patient Active Problem List   Diagnosis     Swelling, mass, or lump in head and neck     Left knee injury     ACP (advance care planning)     Hypothyroidism, unspecified type     Female infertility     Past Surgical History:   Procedure Laterality Date     CARDIAC SURGERY      tachacardia in the past     SOFT TISSUE SURGERY      wisdom teeth extraction       Social History   Substance Use Topics     Smoking status: Never Smoker     Smokeless tobacco: Never Used     Alcohol use No     Family History   Problem Relation Age of Onset     Cerebrovascular Disease Maternal Grandmother      CVA     Diabetes Maternal Grandmother      Breast Cancer Maternal Grandmother      Hypertension Father      Hyperlipidemia Mother      Depression Mother      Obesity Mother      Other Cancer Paternal Grandmother      Coronary Artery Disease No family hx of      Colon Cancer No family hx of      Prostate Cancer No family hx of      Anxiety Disorder No family hx of      Mental Illness No family hx of      Substance Abuse No family hx of      Anesthesia Reaction No family hx of      Asthma No family hx of      Osteoperosis No family hx of      Genetic Disorder No family hx of      Thyroid Disease No family hx of          Current Outpatient Prescriptions   Medication Sig Dispense Refill     letrozole (FEMARA) 2.5 MG tablet Take 1 tablet (2.5  "mg) by mouth daily Day 3-7 of menstrual cycle. 5 tablet 1     levothyroxine (SYNTHROID/LEVOTHROID) 100 MCG tablet TAKE 1 TABLET BY MOUTH DAILY 30 tablet 0     Prenatal Vit-Fe Fumarate-FA (PRENATAL 19) CHEW Take 1 chew tab by mouth daily       Allergies   Allergen Reactions     Amoxicillin Rash       Reviewed and updated as needed this visit by clinical staff       Reviewed and updated as needed this visit by Provider         ROS:  CONSTITUTIONAL: NEGATIVE for fever, chills, change in weight  INTEGUMENTARY/SKIN: acne increasing  ENT/MOUTH: NEGATIVE for ear, mouth and throat problems  RESP: NEGATIVE for significant cough or SOB  CV: NEGATIVE for chest pain, palpitations or peripheral edema  ENDOCRINE: Hx thyroid disease    OBJECTIVE:     /70  Pulse 72  Temp 98.6  F (37  C) (Tympanic)  Resp 18  Ht 5' 6\" (1.676 m)  Wt 198 lb (89.8 kg)  SpO2 98%  BMI 31.96 kg/m2  Body mass index is 31.96 kg/(m^2).   GENERAL: healthy, alert and no distress  NECK: no adenopathy, no asymmetry, masses, or scars and thyroid normal to palpation  RESP: lungs clear to auscultation - no rales, rhonchi or wheezes  CV: regular rate and rhythm, normal S1 S2, no S3 or S4, no murmur, click or rub, no peripheral edema and peripheral pulses strong  PSYCH: mentation appears normal, affect normal/bright    Diagnostic Test Results:  Results for orders placed or performed in visit on 07/25/18 (from the past 24 hour(s))   TSH with free T4 reflex   Result Value Ref Range    TSH 0.78 0.40 - 4.00 mU/L       ASSESSMENT/PLAN:     1. Hypothyroidism, unspecified type  Reordered Levothyroxine same dose plan for follow up in 1 year with normal TSH level  - TSH with free T4 reflex  - levothyroxine (SYNTHROID/LEVOTHROID) 100 MCG tablet; Take 1 tablet (100 mcg) by mouth daily  Dispense: 90 tablet; Refill: 3    Gale agrees with plan and all questions answered    See Patient Instructions    DAMON Soler [facility]  "

## 2018-07-25 ENCOUNTER — OFFICE VISIT (OUTPATIENT)
Dept: FAMILY MEDICINE | Facility: OTHER | Age: 28
End: 2018-07-25
Attending: NURSE PRACTITIONER
Payer: COMMERCIAL

## 2018-07-25 VITALS
RESPIRATION RATE: 18 BRPM | HEIGHT: 66 IN | TEMPERATURE: 98.6 F | HEART RATE: 72 BPM | BODY MASS INDEX: 31.82 KG/M2 | DIASTOLIC BLOOD PRESSURE: 70 MMHG | OXYGEN SATURATION: 98 % | SYSTOLIC BLOOD PRESSURE: 110 MMHG | WEIGHT: 198 LBS

## 2018-07-25 DIAGNOSIS — E03.9 HYPOTHYROIDISM, UNSPECIFIED TYPE: Primary | ICD-10-CM

## 2018-07-25 LAB — TSH SERPL DL<=0.005 MIU/L-ACNC: 0.78 MU/L (ref 0.4–4)

## 2018-07-25 PROCEDURE — 99213 OFFICE O/P EST LOW 20 MIN: CPT | Performed by: NURSE PRACTITIONER

## 2018-07-25 PROCEDURE — 36415 COLL VENOUS BLD VENIPUNCTURE: CPT | Performed by: NURSE PRACTITIONER

## 2018-07-25 PROCEDURE — 84443 ASSAY THYROID STIM HORMONE: CPT | Performed by: NURSE PRACTITIONER

## 2018-07-25 RX ORDER — LEVOTHYROXINE SODIUM 100 UG/1
100 TABLET ORAL DAILY
Qty: 90 TABLET | Refills: 3 | Status: SHIPPED | OUTPATIENT
Start: 2018-07-25 | End: 2019-08-29

## 2018-07-25 ASSESSMENT — ANXIETY QUESTIONNAIRES
6. BECOMING EASILY ANNOYED OR IRRITABLE: NOT AT ALL
2. NOT BEING ABLE TO STOP OR CONTROL WORRYING: NOT AT ALL
4. TROUBLE RELAXING: NOT AT ALL
GAD7 TOTAL SCORE: 0
1. FEELING NERVOUS, ANXIOUS, OR ON EDGE: NOT AT ALL
7. FEELING AFRAID AS IF SOMETHING AWFUL MIGHT HAPPEN: NOT AT ALL
3. WORRYING TOO MUCH ABOUT DIFFERENT THINGS: NOT AT ALL
5. BEING SO RESTLESS THAT IT IS HARD TO SIT STILL: NOT AT ALL

## 2018-07-25 ASSESSMENT — PAIN SCALES - GENERAL: PAINLEVEL: NO PAIN (0)

## 2018-07-25 NOTE — PATIENT INSTRUCTIONS
Hypothyroidism       You have hypothyroidism. This means your thyroid gland is not making enough thyroid hormone. This hormone is vital to body growth and metabolism. If you don t make enough, many body processes slow down. This can cause symptoms throughout the body. Hypothyroidism can range from mild to severe. The most severe form is called myxedema.  There are a number of causes of hypothyroidism. A common cause is Hashimoto s disease. This disease causes the body s own immune system to attack the thyroid gland. When you have certain treatments, such as surgery to remove the thyroid gland, this can also cause hypothyroidism.  Symptoms of hypothyroidism can include:    Fatigue    Trouble concentrating or thinking clearly; forgetfulness    Dry skin    Hair loss    Weight gain    Low tolerance to cold    Constipation    Depression    Personality changes    Tingling or prickling of the hands or feet    Heavy, absent, or irregular periods (women only)  Older adults may sometimes have other symptoms. These can include:    Muscle aches and weakness    Confusion    Incontinence (unable to control urine or stool)    Trouble moving around    Falling  Treatment for hypothyroidism involves taking thyroid hormone pills daily. These pills replace the hormone your thyroid doesn t make. You will likely need to take a daily pill for the rest of your life. Tips for taking this medicine are given below.  Home care  Tips for taking your medicine    Take your thyroid hormone pills as prescribed by your healthcare provider. This is most often 1 pill a day on an empty stomach. Use a pillbox labeled with the days of the week. This will help you remember to take your pill each day.    Don t take products that contain iron and calcium or antacids within 4 hours of taking your thyroid hormone pills.    Don t take other medicines with your thyroid hormone pill without checking with your provider first.    Tell your provider if you have  any side effects from your medicines that bother you.    Never change the dosage or stop taking your thyroid pills without talking to your provider first.  General care    Always talk with your provider before trying other medicines or treatments for your thyroid problem.    If you see other healthcare providers, be sure to let them know about your thyroid problem.  Follow-up care  See your healthcare provider for checkups as advised. You may need regular tests to check the level of thyroid hormone in your blood.  When to seek medical advice  Call your healthcare provider right away if any of these occur:    New symptoms develop    Symptoms return, continue, or worsen even after treatment    Extreme fatigue    Puffy hands, face, or feet    Fast or irregular heartbeat    Confusion  Call 911  Call 911 if any of these occur:    Fainting    Chest pain    Shortness of breath or trouble breathing  Date Last Reviewed: 8/24/2015 2000-2017 [employer]. [address] [place] [address]. All rights reserved. This information is not intended as a substitute for professional medical care. Always follow your healthcare professional's instructions.

## 2018-07-25 NOTE — MR AVS SNAPSHOT
After Visit Summary   7/25/2018    Gale Adams    MRN: [id number]           Patient Information     Date Of Birth          1990        Visit Information        Provider Department      7/25/2018 2:40 PM Danii Landa APRN [facility]        Today's Diagnoses     Hypothyroidism, unspecified type    -  1      Care Instructions      Hypothyroidism       You have hypothyroidism. This means your thyroid gland is not making enough thyroid hormone. This hormone is vital to body growth and metabolism. If you don t make enough, many body processes slow down. This can cause symptoms throughout the body. Hypothyroidism can range from mild to severe. The most severe form is called myxedema.  There are a number of causes of hypothyroidism. A common cause is Hashimoto s disease. This disease causes the body s own immune system to attack the thyroid gland. When you have certain treatments, such as surgery to remove the thyroid gland, this can also cause hypothyroidism.  Symptoms of hypothyroidism can include:    Fatigue    Trouble concentrating or thinking clearly; forgetfulness    Dry skin    Hair loss    Weight gain    Low tolerance to cold    Constipation    Depression    Personality changes    Tingling or prickling of the hands or feet    Heavy, absent, or irregular periods (women only)  Older adults may sometimes have other symptoms. These can include:    Muscle aches and weakness    Confusion    Incontinence (unable to control urine or stool)    Trouble moving around    Falling  Treatment for hypothyroidism involves taking thyroid hormone pills daily. These pills replace the hormone your thyroid doesn t make. You will likely need to take a daily pill for the rest of your life. Tips for taking this medicine are given below.  Home care  Tips for taking your medicine    Take your thyroid hormone pills as prescribed by your healthcare provider. This is most often 1 pill a day  on an empty stomach. Use a pillbox labeled with the days of the week. This will help you remember to take your pill each day.    Don t take products that contain iron and calcium or antacids within 4 hours of taking your thyroid hormone pills.    Don t take other medicines with your thyroid hormone pill without checking with your provider first.    Tell your provider if you have any side effects from your medicines that bother you.    Never change the dosage or stop taking your thyroid pills without talking to your provider first.  General care    Always talk with your provider before trying other medicines or treatments for your thyroid problem.    If you see other healthcare providers, be sure to let them know about your thyroid problem.  Follow-up care  See your healthcare provider for checkups as advised. You may need regular tests to check the level of thyroid hormone in your blood.  When to seek medical advice  Call your healthcare provider right away if any of these occur:    New symptoms develop    Symptoms return, continue, or worsen even after treatment    Extreme fatigue    Puffy hands, face, or feet    Fast or irregular heartbeat    Confusion  Call 911  Call 911 if any of these occur:    Fainting    Chest pain    Shortness of breath or trouble breathing  Date Last Reviewed: 8/24/2015 2000-2017 [employer]. [address] [place] [address]. All rights reserved. This information is not intended as a substitute for professional medical care. Always follow your healthcare professional's instructions.                Follow-ups after your visit        Who to contact     If you have questions or need follow up information about today's clinic visit or your schedule please contact [facility] directly at [phone].  Normal or non-critical lab and imaging results will be communicated to you by MyChart, letter or phone within 4 business days after the clinic has received  "the results. If you do not hear from us within 7 days, please contact the clinic through [employer] or phone. If you have a critical or abnormal lab result, we will notify you by phone as soon as possible.  Submit refill requests through [employer] or call your pharmacy and they will forward the refill request to us. Please allow 3 business days for your refill to be completed.          Additional Information About Your Visit        [employer] Information     [employer] gives you secure access to your electronic health record. If you see a primary care provider, you can also send messages to your care team and make appointments. If you have questions, please call your primary care clinic.  If you do not have a primary care provider, please call [phone] and they will assist you.        Care EveryWhere ID     This is your Care EveryWhere ID. This could be used by other organizations to access your [facility] medical records  [id number]        Your Vitals Were     Pulse Temperature Respirations Height Pulse Oximetry BMI (Body Mass Index)    72 98.6  F (37  C) (Tympanic) 18 5' 6\" (1.676 m) 98% 31.96 kg/m2       Blood Pressure from Last 3 Encounters:   07/25/18 110/70   07/02/18 112/68   06/29/18 110/62    Weight from Last 3 Encounters:   07/25/18 198 lb (89.8 kg)   07/02/18 198 lb 12.8 oz (90.2 kg)   06/29/18 204 lb (92.5 kg)              We Performed the Following     TSH with free T4 reflex        Primary Care Provider Office Phone # Fax #    Danii DAMON Robb [facility] [phone]       [address] [facility] [address]        Equal Access to Services     VAN ROMAN : Hadmumtaz Haney, brittany lawrence, qaramin lópez. So [facility] [phone].    ATENCIÓN: Si habla español, tiene a hewitt disposición servicios gratuitos de asistencia lingüística. Nuno al [phone].    We comply with applicable federal civil rights laws and [place] laws. " We do not discriminate on the basis of race, color, national origin, age, disability, sex, sexual orientation, or gender identity.            Thank you!     Thank you for choosing [facility]  for your care. Our goal is always to provide you with excellent care. Hearing back from our patients is one way we can continue to improve our services. Please take a few minutes to complete the written survey that you may receive in the mail after your visit with us. Thank you!             Your Updated Medication List - Protect others around you: Learn how to safely use, store and throw away your medicines at www.disposemymeds.org.          This list is accurate as of 7/25/18  3:07 PM.  Always use your most recent med list.                   Brand Name Dispense Instructions for use Diagnosis    letrozole 2.5 MG tablet    FEMARA    5 tablet    Take 1 tablet (2.5 mg) by mouth daily Day 3-7 of menstrual cycle.    Female infertility       levothyroxine 100 MCG tablet    SYNTHROID/LEVOTHROID    30 tablet    TAKE 1 TABLET BY MOUTH DAILY    Hypothyroidism, unspecified type       PRENATAL 19 Chew      Take 1 chew tab by mouth daily

## 2018-07-26 ASSESSMENT — ANXIETY QUESTIONNAIRES: GAD7 TOTAL SCORE: 0

## 2018-07-26 ASSESSMENT — PATIENT HEALTH QUESTIONNAIRE - PHQ9: SUM OF ALL RESPONSES TO PHQ QUESTIONS 1-9: 1

## 2018-08-10 DIAGNOSIS — N97.9 FEMALE INFERTILITY: ICD-10-CM

## 2018-08-10 PROCEDURE — 99000 SPECIMEN HANDLING OFFICE-LAB: CPT | Performed by: OBSTETRICS & GYNECOLOGY

## 2018-08-10 PROCEDURE — 84144 ASSAY OF PROGESTERONE: CPT | Mod: 90 | Performed by: OBSTETRICS & GYNECOLOGY

## 2018-08-10 PROCEDURE — 36415 COLL VENOUS BLD VENIPUNCTURE: CPT | Performed by: OBSTETRICS & GYNECOLOGY

## 2018-08-13 LAB — PROGEST SERPL-MCNC: 0.6 NG/ML

## 2018-08-14 DIAGNOSIS — N97.9 FEMALE INFERTILITY: Primary | ICD-10-CM

## 2018-08-14 DIAGNOSIS — N97.9 FEMALE INFERTILITY: ICD-10-CM

## 2018-08-14 RX ORDER — LETROZOLE 2.5 MG/1
5 TABLET, FILM COATED ORAL DAILY
Qty: 10 TABLET | Refills: 1 | Status: SHIPPED | OUTPATIENT
Start: 2018-08-14 | End: 2018-09-12

## 2018-09-12 ENCOUNTER — HOSPITAL ENCOUNTER (EMERGENCY)
Facility: HOSPITAL | Age: 28
Discharge: HOME | End: 2018-09-12
Attending: NURSE PRACTITIONER | Admitting: NURSE PRACTITIONER
Payer: COMMERCIAL

## 2018-09-12 ENCOUNTER — TELEPHONE (OUTPATIENT)
Dept: FAMILY MEDICINE | Facility: OTHER | Age: 28
End: 2018-09-12

## 2018-09-12 VITALS
SYSTOLIC BLOOD PRESSURE: 127 MMHG | RESPIRATION RATE: 16 BRPM | DIASTOLIC BLOOD PRESSURE: 80 MMHG | OXYGEN SATURATION: 98 % | TEMPERATURE: 98.2 F

## 2018-09-12 DIAGNOSIS — L42 PITYRIASIS ROSEA: ICD-10-CM

## 2018-09-12 PROCEDURE — 99213 OFFICE O/P EST LOW 20 MIN: CPT | Performed by: NURSE PRACTITIONER

## 2018-09-12 PROCEDURE — G0463 HOSPITAL OUTPT CLINIC VISIT: HCPCS

## 2018-09-12 RX ORDER — PREDNISONE 20 MG/1
20 TABLET ORAL DAILY
Qty: 5 TABLET | Refills: 0 | Status: SHIPPED | OUTPATIENT
Start: 2018-09-12 | End: 2018-09-25

## 2018-09-12 ASSESSMENT — ENCOUNTER SYMPTOMS
FATIGUE: 0
FEVER: 0
DIZZINESS: 0
SHORTNESS OF BREATH: 0
MUSCULOSKELETAL NEGATIVE: 1
SORE THROAT: 0
COUGH: 0
HEADACHES: 0

## 2018-09-12 NOTE — ED TRIAGE NOTES
Pt presents with a circular reddened rash for 1 week- with the majority of the rash appearing yesterday and today. Has rash to her arms ,legs,stomach and chest.

## 2018-09-12 NOTE — TELEPHONE ENCOUNTER
1:58 PM    Reason for Call: OVERBOOK    Patient is having the following symptoms: Poss hives/red skin dots for 1 weeks and spreading    The patient is requesting an appointment for ASAP with Dr Grossman (Danii Landa out for rest of week).    Was an appointment offered for this call? Yes  If yes : Appointment type short              Date  09/24/18    Preferred method for responding to this message: Telephone Call  What is your phone number ? Ext [phone]    If we cannot reach you directly, may we leave a detailed response at the number you provided? Yes    Can this message wait until your PCP/provider returns, if unavailable today? No, PCP out     Rizwana Smith

## 2018-09-12 NOTE — ED PROVIDER NOTES
History     Chief Complaint   Patient presents with     Rash     c/o rash x 1 week     HPI  Gale Adams is a 28 year old female who started with a large circular rash to right chest, now has developed smaller red circular lesions to abdomen and thighs, few on arms. Do not itch or hurt. No recent URI though had pneumonia 4 months ago.  No new soaps, foods, clothes perfumes.  No known tick bites, though has a hernandez that sleeps with her at night.   No known allergies.     Problem List:    Patient Active Problem List    Diagnosis Date Noted     Female infertility 12/06/2017     Priority: Medium       Possible male factor-  Decreased motility.  Urology referral  Dr. Quarles Repeat semen analysis when off alcohol x 3-6 months.    Repeat Nml by report.    Anovulation, clomid ->Letrozole       Hypothyroidism, unspecified type 03/15/2017     Priority: Medium     ACP (advance care planning) 06/17/2016     Priority: Medium     Advance Care Planning 6/17/2016: ACP Review of Chart / Resources Provided:  Reviewed chart for advance care plan.  Gale Adams has no plan or code status on file. Discussed available resources and provided with information. Confirmed code status reflects current choices pending further ACP discussions.  Confirmed/documented legally designated decision makers.  Added by Abi Llamas             Left knee injury 06/12/2015     Priority: Medium     Swelling, mass, or lump in head and neck 11/21/2013     Priority: Medium        Past Medical History:    Past Medical History:   Diagnosis Date     Acute upper respiratory infections of unspecified site 01/03/2001     Need for prophylactic vaccination and inoculation against other specified disease 03/05/2003       Past Surgical History:    Past Surgical History:   Procedure Laterality Date     CARDIAC SURGERY      tachacardia in the past     SOFT TISSUE SURGERY      wisdom teeth extraction       Family History:    Family History   Problem  Relation Age of Onset     Cerebrovascular Disease Maternal Grandmother      CVA     Diabetes Maternal Grandmother      Breast Cancer Maternal Grandmother      Hypertension Father      Hyperlipidemia Mother      Depression Mother      Obesity Mother      Other Cancer Paternal Grandmother      Coronary Artery Disease No family hx of      Colon Cancer No family hx of      Prostate Cancer No family hx of      Anxiety Disorder No family hx of      Mental Illness No family hx of      Substance Abuse No family hx of      Anesthesia Reaction No family hx of      Asthma No family hx of      Osteoporosis No family hx of      Genetic Disorder No family hx of      Thyroid Disease No family hx of        Social History:  Marital Status:   [2]  Social History   Substance Use Topics     Smoking status: Never Smoker     Smokeless tobacco: Never Used     Alcohol use No        Medications:      levothyroxine (SYNTHROID/LEVOTHROID) 100 MCG tablet   predniSONE (DELTASONE) 20 MG tablet   Prenatal Vit-Fe Fumarate-FA (PRENATAL 19) CHEW         Review of Systems   Constitutional: Negative for fatigue and fever.   HENT: Negative for ear pain and sore throat.    Respiratory: Negative for cough and shortness of breath.    Musculoskeletal: Negative.    Skin: Positive for rash.        Larger spot  on right chest -fading now  - smaller  Red  spots on abdomen and thighs   Neurological: Negative for dizziness and headaches.       Physical Exam   BP: 127/80  Heart Rate: 76  Temp: 98.2  F (36.8  C)  Resp: 16  SpO2: 98 %      Physical Exam   Constitutional: She is oriented to person, place, and time. She appears well-developed and well-nourished.   HENT:   Right Ear: External ear normal.   Left Ear: External ear normal.   Nose: Nose normal.   Mouth/Throat: Oropharynx is clear and moist.   Eyes: Conjunctivae and EOM are normal. Pupils are equal, round, and reactive to light.   Neck: Normal range of motion. Neck supple. Thyromegaly present.    Cardiovascular: Normal rate, regular rhythm and normal heart sounds.    No murmur heard.  Pulmonary/Chest: Effort normal and breath sounds normal.   Musculoskeletal: Normal range of motion. She exhibits no edema.   Lymphadenopathy:     She has no cervical adenopathy.   Neurological: She is alert and oriented to person, place, and time.   Skin:   Has 4cm fading red spot on right chest(Winslow spot)   And 6-8mm round red lesions on abdomen and thighs.  Have slight scale in middle.  Looks like pityriasis rosea.         ED Course     ED Course     Procedures                 No results found for this or any previous visit (from the past 24 hour(s)).    Medications - No data to display    Assessments & Plan (with Medical Decision Making)     I have reviewed the nursing notes.    I have reviewed the findings, diagnosis, plan and need for follow up with the patient.      New Prescriptions    PREDNISONE (DELTASONE) 20 MG TABLET    Take 1 tablet (20 mg) by mouth daily       Final diagnoses:   Pityriasis rosea     ASSESSMENT / PLAN:  (L42) Pityriasis rosea  Comment:Does not look like lyme or ringworm     Plan: 1. Prednisone 20mg a day for 5 days  2. Can use Triamcinolone cream to lesions that are itchy  3 Self limiting, come in crops.        9/12/2018   [facility]     Michael Donnelly NP  09/12/18 8031

## 2018-09-12 NOTE — ED AVS SNAPSHOT
[facility]    [address] [address]    Phone:  [phone]                                       Gale Adams   MRN: [id number]    Department:  [facility]   Date of Visit:  9/12/2018           After Visit Summary Signature Page     I have received my discharge instructions, and my questions have been answered. I have discussed any challenges I see with this plan with the nurse or doctor.    ..........................................................................................................................................  Patient/Patient Representative Signature      ..........................................................................................................................................  Patient Representative Print Name and Relationship to Patient    ..................................................               ................................................  Date                                   Time    ..........................................................................................................................................  Reviewed by Signature/Title    ...................................................              ..............................................  Date                                               Time          22EPIC Rev 08/18

## 2018-09-12 NOTE — DISCHARGE INSTRUCTIONS
Pityriasis Rosea    1. Prednisone 20mg a day for 5 days  2. Triamcinalone cream for any itchy lesions.    3. Self limiting. Comes in crops.    This is a harmless non-contagious rash. The exact cause is unknown. It is not an allergic reaction, and does not seem to be caused by a viral or fungal infection. Although anyone can get it, it is most often seen in children and young adults ages 10 to 35.  Pityriasis usually resolves on its own without treatment in 2 weeks.  In some people it may take 6 to 8 weeks to clear up.   Home care    For dry skin, use a moisturizing cream. For itchiness, use 1% hydrocortisone cream (no prescription needed) or calamine lotion 2 to 3 times a day.    Exposure to natural sunlight helps some people. It may help fade the rash, but doesn't seem to help the itching. Don't overdo it in the sun, as your skin may be more sensitive than usual. You don t want to burn yourself. Artificial sun lamps, sun beds, and tanning salons are not recommended.    This condition is not contagious. Your child may attend  or school while the rash is present.  Medicines  Talk with your healthcare provider before using any medicines. All medicines have side effects.    Medicines will not get rid of the rash.     Moisturizing skin creams may help.    Antihistamines may help with itching, but they can make you sleepy.    Topical steroids are sometimes used.  Follow-up care  Follow up with your healthcare provider, or as advised. Call your provider if the itching is not controlled by the above suggestions, or if the rash lasts longer than 3 months.  When to seek medical advice  Call your healthcare provider right away if any of these occur:    You develop a rash and are pregnant    Severe itching    Signs of infection in the skin (increasing redness, drainage of pus, yellow-brown scabs)    Fever or other symptoms of a new illness  Date Last Reviewed: 8/1/2016 2000-2017 [employer] [address]  [place] [address]. All rights reserved. This information is not intended as a substitute for professional medical care. Always follow your healthcare professional's instructions.          Understanding Pityriasis Rosea    Pityriasis rosea is a type of skin rash. It starts with one large round or oval scaly patch called the herald patch, and then causes many more small patches. The rash most often appears on the chest, back, and belly. It can take 1 to 2 months to go away. But once it s gone, it doesn t come back.  How to say it  [id number]-EYE-ah-sis RO-zee-ah   What causes pityriasis rosea?  The cause is not yet known but experts think it may be from a virus. The rash happens most often in people ages 10 to 35, and in pregnant women. If you are pregnant, make sure to tell your healthcare provider about your rash.  Symptoms of pityriasis rosea  In some people, the rash shows up 1 to 2 weeks after symptoms such as headache, sore throat, nausea, stuffy nose, and fever. The rash often starts with one large scaly patch in the shape of a Andreafski or oval. The patch may be pink or red if you have pale skin. It may be purple, brown, or gray if you have darker skin. It can be 1 to 2 inches wide or larger. It usually appears on the chest or back. This is called a herald or mother patch.  Smaller patches then show up in 1 to 2 weeks on the chest, back, belly, arms, and legs. It can also show up on the neck and face. The rash can form the shape of a [place] tree on your back. The patches may itch, especially if your skin gets warmer during exercise or a hot shower. You may also feel tired and achy.  Treatment for pityriasis rosea  The rash should go away without treatment, but it can take 4 to 8 weeks or longer. Once the rash goes away, it doesn t come back.  You can treat your itching with any of these:    Corticosteroid cream or ointment. You can apply this medicine to the rash 2 to 3 times a day, for up to 3  weeks.    Calamine lotion. This is a pink, watery lotion that can help stop itching.    Antihistamine. This medicine can help reduce itching. You can put it on the skin as a cream or take it by mouth as a pill.    Other anti-itch lotion or cream. Ask your healthcare provider about other anti-itch lotion or cream that can help relieve itching. He or she may prescribe a stronger medicine if drugstore medicine isn t helping you.  If you have severe symptoms, your healthcare provider may treat you with any of the below:    Prednisone. This is an oral steroid medicine. It can help relieve severe itching if needed.    Acyclovir. This is a type of anti-virus medicine. It may help the rash go away sooner in some people.    Ultraviolet light treatment. Exposing the skin to ultraviolet light in the first week can help lessen symptoms.  When to call your healthcare provider  Call your healthcare provider right away if you have any of these:    New symptoms    Rash that lasts for more than 3 months    Symptoms that don t get better in 1 to 2 months, or get worse   Date Last Reviewed: 5/1/2016 2000-2017 [employer]. [address] [place] [address], [place] [address]. All rights reserved. This information is not intended as a substitute for professional medical care. Always follow your healthcare professional's instructions.

## 2018-09-18 DIAGNOSIS — N97.9 FEMALE INFERTILITY: ICD-10-CM

## 2018-09-18 LAB — PROGEST SERPL-MCNC: 0.5 NG/ML

## 2018-09-18 PROCEDURE — 84144 ASSAY OF PROGESTERONE: CPT | Mod: 90 | Performed by: OBSTETRICS & GYNECOLOGY

## 2018-09-18 PROCEDURE — 99000 SPECIMEN HANDLING OFFICE-LAB: CPT | Performed by: OBSTETRICS & GYNECOLOGY

## 2018-09-18 PROCEDURE — 36415 COLL VENOUS BLD VENIPUNCTURE: CPT | Performed by: OBSTETRICS & GYNECOLOGY

## 2018-09-19 DIAGNOSIS — N97.9 FEMALE INFERTILITY: Primary | ICD-10-CM

## 2018-09-19 RX ORDER — LETROZOLE 2.5 MG/1
7.5 TABLET, FILM COATED ORAL DAILY
Qty: 15 TABLET | Refills: 1 | Status: SHIPPED | OUTPATIENT
Start: 2018-09-19 | End: 2019-02-01

## 2018-09-25 ENCOUNTER — OFFICE VISIT (OUTPATIENT)
Dept: FAMILY MEDICINE | Facility: OTHER | Age: 28
End: 2018-09-25
Attending: NURSE PRACTITIONER
Payer: COMMERCIAL

## 2018-09-25 VITALS
BODY MASS INDEX: 32.83 KG/M2 | SYSTOLIC BLOOD PRESSURE: 120 MMHG | OXYGEN SATURATION: 98 % | TEMPERATURE: 99 F | HEART RATE: 76 BPM | WEIGHT: 203.4 LBS | DIASTOLIC BLOOD PRESSURE: 82 MMHG

## 2018-09-25 DIAGNOSIS — E03.9 HYPOTHYROIDISM, UNSPECIFIED TYPE: ICD-10-CM

## 2018-09-25 DIAGNOSIS — L42 PITYRIASIS ROSEA: Primary | ICD-10-CM

## 2018-09-25 PROCEDURE — 99213 OFFICE O/P EST LOW 20 MIN: CPT | Performed by: NURSE PRACTITIONER

## 2018-09-25 RX ORDER — TRIAMCINOLONE ACETONIDE 1 MG/G
OINTMENT TOPICAL
Qty: 80 G | Refills: 0 | Status: SHIPPED | OUTPATIENT
Start: 2018-09-25 | End: 2019-01-15

## 2018-09-25 ASSESSMENT — ANXIETY QUESTIONNAIRES
6. BECOMING EASILY ANNOYED OR IRRITABLE: NOT AT ALL
5. BEING SO RESTLESS THAT IT IS HARD TO SIT STILL: NOT AT ALL
7. FEELING AFRAID AS IF SOMETHING AWFUL MIGHT HAPPEN: NOT AT ALL
2. NOT BEING ABLE TO STOP OR CONTROL WORRYING: NOT AT ALL
GAD7 TOTAL SCORE: 0
3. WORRYING TOO MUCH ABOUT DIFFERENT THINGS: NOT AT ALL
4. TROUBLE RELAXING: NOT AT ALL
1. FEELING NERVOUS, ANXIOUS, OR ON EDGE: NOT AT ALL

## 2018-09-25 ASSESSMENT — PAIN SCALES - GENERAL: PAINLEVEL: NO PAIN (0)

## 2018-09-25 NOTE — PATIENT INSTRUCTIONS
Pityriasis Rosea  This is a harmless non-contagious rash. The exact cause is unknown. It is not an allergic reaction, and does not seem to be caused by a viral or fungal infection. Although anyone can get it, it is most often seen in children and young adults ages 10 to 35.  Pityriasis usually resolves on its own without treatment in 2 weeks.  In some people it may take 6 to 8 weeks to clear up.   Home care    For dry skin, use a moisturizing cream. For itchiness, use 1% hydrocortisone cream (no prescription needed) or calamine lotion 2 to 3 times a day.    Exposure to natural sunlight helps some people. It may help fade the rash, but doesn't seem to help the itching. Don't overdo it in the sun, as your skin may be more sensitive than usual. You don t want to burn yourself. Artificial sun lamps, sun beds, and tanning salons are not recommended.    This condition is not contagious. Your child may attend  or school while the rash is present.  Medicines  Talk with your healthcare provider before using any medicines. All medicines have side effects.    Medicines will not get rid of the rash.     Moisturizing skin creams may help.    Antihistamines may help with itching, but they can make you sleepy.    Topical steroids are sometimes used.  Follow-up care  Follow up with your healthcare provider, or as advised. Call your provider if the itching is not controlled by the above suggestions, or if the rash lasts longer than 3 months.  When to seek medical advice  Call your healthcare provider right away if any of these occur:    You develop a rash and are pregnant    Severe itching    Signs of infection in the skin (increasing redness, drainage of pus, yellow-brown scabs)    Fever or other symptoms of a new illness  Date Last Reviewed: 8/1/2016 2000-2017 [employer]. [address] [place] [address], [place] [address]. All rights reserved. This information is not intended as a substitute for professional  medical care. Always follow your healthcare professional's instructions.

## 2018-09-25 NOTE — PROGRESS NOTES
SUBJECTIVE:   Gale Adams is a 28 year old female who presents to clinic today for the following health issues:      ED/UC Followup:    Facility: [facility] [place]  Date of visit: 9/12/2018  Reason for visit: pityriasis rosea  Current Status: Took prednisone for 5 days, rash started to go away. Once stopped prednisone rash came back and even worse. No s/sx, denies itching, burning. Just the rash, redness.  Noted rash stared after starting new fertility medication      Gale would like to have her thyroid check due to neck swelling. Last TSH   TSH   Date Value Ref Range Status   07/25/2018 0.78 0.40 - 4.00 mU/L Final             Problem list and histories reviewed & adjusted, as indicated.  Additional history: as documented    Patient Active Problem List   Diagnosis     Swelling, mass, or lump in head and neck     Left knee injury     ACP (advance care planning)     Hypothyroidism, unspecified type     Female infertility     Past Surgical History:   Procedure Laterality Date     CARDIAC SURGERY      tachacardia in the past     SOFT TISSUE SURGERY      wisdom teeth extraction       Social History   Substance Use Topics     Smoking status: Never Smoker     Smokeless tobacco: Never Used     Alcohol use No     Family History   Problem Relation Age of Onset     Cerebrovascular Disease Maternal Grandmother      CVA     Diabetes Maternal Grandmother      Breast Cancer Maternal Grandmother      Hypertension Father      Hyperlipidemia Mother      Depression Mother      Obesity Mother      Other Cancer Paternal Grandmother      Coronary Artery Disease No family hx of      Colon Cancer No family hx of      Prostate Cancer No family hx of      Anxiety Disorder No family hx of      Mental Illness No family hx of      Substance Abuse No family hx of      Anesthesia Reaction No family hx of      Asthma No family hx of      Osteoporosis No family hx of      Genetic Disorder No family hx of      Thyroid Disease No family hx of           Current Outpatient Prescriptions   Medication Sig Dispense Refill     levothyroxine (SYNTHROID/LEVOTHROID) 100 MCG tablet Take 1 tablet (100 mcg) by mouth daily 90 tablet 3     Prenatal Vit-Fe Fumarate-FA (PRENATAL 19) CHEW Take 1 chew tab by mouth daily       letrozole (FEMARA) 2.5 MG tablet Take 3 tablets (7.5 mg) by mouth daily for 5 days Day 3-7 of menstrual cycle. 15 tablet 1     predniSONE (DELTASONE) 20 MG tablet Take 1 tablet (20 mg) by mouth daily (Patient not taking: Reported on 9/25/2018) 5 tablet 0     Allergies   Allergen Reactions     Amoxicillin Rash       Reviewed and updated as needed this visit by clinical staff       Reviewed and updated as needed this visit by Provider         ROS:  CONSTITUTIONAL: NEGATIVE for fever, chills, change in weight  INTEGUMENTARY/SKIN: dry red patches generalized increasing in numbers. Did decrease with the prednisone, but they are starting to return  RESP: NEGATIVE for significant cough or SOB  CV: NEGATIVE for chest pain, palpitations or peripheral edema    OBJECTIVE:     /82 (BP Location: Left arm, Patient Position: Chair, Cuff Size: Adult Regular)  Pulse 76  Temp 99  F (37.2  C) (Tympanic)  Wt 203 lb 6.4 oz (92.3 kg)  SpO2 98%  BMI 32.83 kg/m2  Body mass index is 32.83 kg/(m^2).   GENERAL: healthy, alert and no distress  NECK: swelling around thyroid neck area  RESP: lungs clear to auscultation - no rales, rhonchi or wheezes  CV: regular rate and rhythm, normal S1 S2, no S3 or S4, no murmur, click or rub, no peripheral edema and peripheral pulses strong  ABDOMEN: soft, nontender, no hepatosplenomegaly, no masses and bowel sounds normal  SKIN: generalized dry erythema patches   PSYCH: mentation appears normal, affect normal/bright    Diagnostic Test Results:  Thyroid US pending    ASSESSMENT/PLAN:     1. Pityriasis rosea  Gale did have some relief from prednisone. Plan today to try triamcinolone ointment and increase zyrtec to 2-4 times a day  short term. If rash does not improve or worsens she should follow up. Possible will need to discuss alternative treatment with her OB/GYN if rash is a reaction to the new fertility medication. She is not having any itching, SOB, or breathing problems. Plan to continue to monitor at this time.   - triamcinolone (KENALOG) 0.1 % ointment; Apply sparingly to affected area three times daily for 14 days.  Dispense: 80 g; Refill: 0    2. Hypothyroidism, unspecified type  Plan for US due to neck swelling. May need endocrine referral or ENT referral.   - US Thyroid; Future        See Patient Instructions    DAMON Soler [facility] [place]

## 2018-09-25 NOTE — NURSING NOTE
"Chief Complaint   Patient presents with     UC Follow-Up       Initial /82 (BP Location: Left arm, Patient Position: Chair, Cuff Size: Adult Regular)  Pulse 76  Temp 99  F (37.2  C) (Tympanic)  Wt 203 lb 6.4 oz (92.3 kg)  SpO2 98%  BMI 32.83 kg/m2 Estimated body mass index is 32.83 kg/(m^2) as calculated from the following:    Height as of 7/25/18: 5' 6\" (1.676 m).    Weight as of this encounter: 203 lb 6.4 oz (92.3 kg).  Medication Reconciliation: complete    Leticia Alexis    "

## 2018-09-25 NOTE — MR AVS SNAPSHOT
After Visit Summary   9/25/2018    Gale Adams    MRN: [id number]           Patient Information     Date Of Birth          1990        Visit Information        Provider Department      9/25/2018 3:10 PM Danii Landa APRN CNP [facility] [place]        Today's Diagnoses     Pityriasis rosea    -  1    Hypothyroidism, unspecified type          Care Instructions      Pityriasis Rosea  This is a harmless non-contagious rash. The exact cause is unknown. It is not an allergic reaction, and does not seem to be caused by a viral or fungal infection. Although anyone can get it, it is most often seen in children and young adults ages 10 to 35.  Pityriasis usually resolves on its own without treatment in 2 weeks.  In some people it may take 6 to 8 weeks to clear up.   Home care    For dry skin, use a moisturizing cream. For itchiness, use 1% hydrocortisone cream (no prescription needed) or calamine lotion 2 to 3 times a day.    Exposure to natural sunlight helps some people. It may help fade the rash, but doesn't seem to help the itching. Don't overdo it in the sun, as your skin may be more sensitive than usual. You don t want to burn yourself. Artificial sun lamps, sun beds, and tanning salons are not recommended.    This condition is not contagious. Your child may attend  or school while the rash is present.  Medicines  Talk with your healthcare provider before using any medicines. All medicines have side effects.    Medicines will not get rid of the rash.     Moisturizing skin creams may help.    Antihistamines may help with itching, but they can make you sleepy.    Topical steroids are sometimes used.  Follow-up care  Follow up with your healthcare provider, or as advised. Call your provider if the itching is not controlled by the above suggestions, or if the rash lasts longer than 3 months.  When to seek medical advice  Call your healthcare provider right away if  any of these occur:    You develop a rash and are pregnant    Severe itching    Signs of infection in the skin (increasing redness, drainage of pus, yellow-brown scabs)    Fever or other symptoms of a new illness  Date Last Reviewed: 8/1/2016 2000-2017 [employer]. [address] [place] [address]. All rights reserved. This information is not intended as a substitute for professional medical care. Always follow your healthcare professional's instructions.                Follow-ups after your visit        Future tests that were ordered for you today     Open Future Orders        Priority Expected Expires Ordered    US Thyroid Routine  9/25/2019 9/25/2018            Who to contact     If you have questions or need follow up information about today's clinic visit or your schedule please contact [facility] directly at [phone].  Normal or non-critical lab and imaging results will be communicated to you by [email or web address], letter or phone within 4 business days after the clinic has received the results. If you do not hear from us within 7 days, please contact the clinic through [email or web address] or phone. If you have a critical or abnormal lab result, we will notify you by phone as soon as possible.  Submit refill requests through [employer] or call your pharmacy and they will forward the refill request to us. Please allow 3 business days for your refill to be completed.          Additional Information About Your Visit        [email or web address] Information     [employer] gives you secure access to your electronic health record. If you see a primary care provider, you can also send messages to your care team and make appointments. If you have questions, please call your primary care clinic.  If you do not have a primary care provider, please call [phone] and they will assist you.        Care EveryWhere ID     This is your Care EveryWhere ID. This could be used by other organizations to access your  [place] medical records  [id number]        Your Vitals Were     Pulse Temperature Pulse Oximetry BMI (Body Mass Index)          76 99  F (37.2  C) (Tympanic) 98% 32.83 kg/m2         Blood Pressure from Last 3 Encounters:   09/25/18 120/82   09/12/18 127/80   07/25/18 110/70    Weight from Last 3 Encounters:   09/25/18 203 lb 6.4 oz (92.3 kg)   07/25/18 198 lb (89.8 kg)   07/02/18 198 lb 12.8 oz (90.2 kg)                 Today's Medication Changes          These changes are accurate as of 9/25/18  3:21 PM.  If you have any questions, ask your nurse or doctor.               Start taking these medicines.        Dose/Directions    triamcinolone 0.1 % ointment   Commonly known as:  KENALOG   Used for:  Pityriasis rosea   Started by:  Danii Landa APRN CNP        Apply sparingly to affected area three times daily for 14 days.   Quantity:  80 g   Refills:  0            Where to get your medicines      These medications were sent to [facility] [place] - [address]  [address] [place] [address]     Phone:  [phone]     triamcinolone 0.1 % ointment                Primary Care Provider Office Phone # Fax #    DAMON Aden [phone]       [address]  [place] [address]        Equal Access to Services     [facility] : Hadii carolyn ku hadasho Soomaali, waaxda luqadaha, qaybta kaalmada adeegyada, ramin leyva . So [facility] [phone].    ATENCIÓN: Si habla español, tiene a hewitt disposición servicios gratuitos de asistencia lingüística. Llame al [phone].    We comply with applicable federal civil rights laws and [place] laws. We do not discriminate on the basis of race, color, national origin, age, disability, sex, sexual orientation, or gender identity.            Thank you!     Thank you for choosing [facility] [place]  for your care. Our goal is always to provide you with excellent care.  Hearing back from our patients is one way we can continue to improve our services. Please take a few minutes to complete the written survey that you may receive in the mail after your visit with us. Thank you!             Your Updated Medication List - Protect others around you: Learn how to safely use, store and throw away your medicines at www.disposemymeds.org.          This list is accurate as of 9/25/18  3:21 PM.  Always use your most recent med list.                   Brand Name Dispense Instructions for use Diagnosis    letrozole 2.5 MG tablet    FEMARA    15 tablet    Take 3 tablets (7.5 mg) by mouth daily for 5 days Day 3-7 of menstrual cycle.    Female infertility       levothyroxine 100 MCG tablet    SYNTHROID/LEVOTHROID    90 tablet    Take 1 tablet (100 mcg) by mouth daily    Hypothyroidism, unspecified type       PRENATAL 19 Chew      Take 1 chew tab by mouth daily        triamcinolone 0.1 % ointment    KENALOG    80 g    Apply sparingly to affected area three times daily for 14 days.    Pityriasis rosea

## 2018-09-26 ASSESSMENT — PATIENT HEALTH QUESTIONNAIRE - PHQ9: SUM OF ALL RESPONSES TO PHQ QUESTIONS 1-9: 0

## 2018-09-26 ASSESSMENT — ANXIETY QUESTIONNAIRES: GAD7 TOTAL SCORE: 0

## 2018-09-27 ENCOUNTER — HOSPITAL ENCOUNTER (OUTPATIENT)
Dept: ULTRASOUND IMAGING | Facility: HOSPITAL | Age: 28
Discharge: HOME | End: 2018-09-27
Attending: NURSE PRACTITIONER | Admitting: NURSE PRACTITIONER
Payer: COMMERCIAL

## 2018-09-27 DIAGNOSIS — E03.9 HYPOTHYROIDISM, UNSPECIFIED TYPE: ICD-10-CM

## 2018-09-27 PROCEDURE — 76536 US EXAM OF HEAD AND NECK: CPT | Mod: TC

## 2018-09-28 ENCOUNTER — TELEPHONE (OUTPATIENT)
Dept: EDUCATION SERVICES | Facility: OTHER | Age: 28
End: 2018-09-28

## 2018-09-28 DIAGNOSIS — E03.9 HYPOTHYROIDISM, UNSPECIFIED TYPE: Primary | ICD-10-CM

## 2018-09-28 DIAGNOSIS — R93.89 THYROID WITH HETEROGENEOUS ECHOTEXTURE DETERMINED BY ULTRASOUND: ICD-10-CM

## 2018-09-28 NOTE — TELEPHONE ENCOUNTER
Called Penny to discuss results of US. It appears as thyroid is increasing in size. Plan to refer to endocrine. Penny agrees with plan and would like to try to get in with [facility] if possible.     Danii JOSE FNP-BC  Family Nurse Practitioner

## 2018-10-01 ENCOUNTER — TELEPHONE (OUTPATIENT)
Dept: FAMILY MEDICINE | Facility: OTHER | Age: 28
End: 2018-10-01

## 2018-10-01 NOTE — TELEPHONE ENCOUNTER
[facility] sent a fax to let us know they have tried to call the pt and have left several messages to return their call. Pt has not returned any calls.  BRIDGET sent to bryon hartman

## 2018-10-01 NOTE — TELEPHONE ENCOUNTER
Please make sure they have the right number.  I spoke with her last week about this.    Danii JOSE FNP-BC  Family Nurse Practitioner

## 2018-10-29 DIAGNOSIS — N97.9 FEMALE INFERTILITY: ICD-10-CM

## 2018-10-29 PROCEDURE — 36415 COLL VENOUS BLD VENIPUNCTURE: CPT | Performed by: OBSTETRICS & GYNECOLOGY

## 2018-10-29 PROCEDURE — 84144 ASSAY OF PROGESTERONE: CPT | Mod: 90 | Performed by: OBSTETRICS & GYNECOLOGY

## 2018-10-29 PROCEDURE — 99000 SPECIMEN HANDLING OFFICE-LAB: CPT | Performed by: OBSTETRICS & GYNECOLOGY

## 2018-10-30 LAB — PROGEST SERPL-MCNC: 14.5 NG/ML

## 2018-10-31 DIAGNOSIS — N97.9 FEMALE INFERTILITY: Primary | ICD-10-CM

## 2018-11-09 NOTE — PROGRESS NOTES
SUBJECTIVE:   Gale Adams is a 28 year old female who presents to clinic today for the following health issues:    Follow up from Endocrinology for thyroid issues      Duration: years    Description (location/character/radiation): N/A    Intensity:  N/A    Accompanying signs and symptoms: N/A    History (similar episodes/previous evaluation): yes, follow up from seeing the endocrinologist    Precipitating or alleviating factors: None    Therapies tried and outcome: levothyroxine     Gale was seen by Dr Claire with Endocrinology 10/26/2018. Plan to continue to monitor TSH and treat as needed.           Problem list and histories reviewed & adjusted, as indicated.  Additional history: as documented    Patient Active Problem List   Diagnosis     Swelling, mass, or lump in head and neck     Left knee injury     ACP (advance care planning)     Hypothyroidism, unspecified type     Female infertility     Past Surgical History:   Procedure Laterality Date     CARDIAC SURGERY      tachacardia in the past     SOFT TISSUE SURGERY      wisdom teeth extraction       Social History   Substance Use Topics     Smoking status: Never Smoker     Smokeless tobacco: Never Used     Alcohol use No     Family History   Problem Relation Age of Onset     Cerebrovascular Disease Maternal Grandmother      CVA     Diabetes Maternal Grandmother      Breast Cancer Maternal Grandmother      Hypertension Father      Hyperlipidemia Mother      Depression Mother      Obesity Mother      Other Cancer Paternal Grandmother      Coronary Artery Disease No family hx of      Colon Cancer No family hx of      Prostate Cancer No family hx of      Anxiety Disorder No family hx of      Mental Illness No family hx of      Substance Abuse No family hx of      Anesthesia Reaction No family hx of      Asthma No family hx of      Osteoporosis No family hx of      Genetic Disorder No family hx of      Thyroid Disease No family hx of          Current  Outpatient Prescriptions   Medication Sig Dispense Refill     letrozole (FEMARA) 2.5 MG tablet Take 3 tablets (7.5 mg) by mouth daily for 5 days Day 3-7 of menstrual cycle. 15 tablet 1     levothyroxine (SYNTHROID/LEVOTHROID) 100 MCG tablet Take 1 tablet (100 mcg) by mouth daily 90 tablet 3     Prenatal Vit-Fe Fumarate-FA (PRENATAL 19) CHEW Take 1 chew tab by mouth daily       triamcinolone (KENALOG) 0.1 % ointment Apply sparingly to affected area three times daily for 14 days. 80 g 0     Allergies   Allergen Reactions     Amoxicillin Rash       Reviewed and updated as needed this visit by clinical staff       Reviewed and updated as needed this visit by Provider         ROS:  CONSTITUTIONAL: NEGATIVE for fever, chills, change in weight  RESP: NEGATIVE for significant cough or SOB  CV: NEGATIVE for chest pain, palpitations or peripheral edema  ENDOCRINE: Hx thyroid disease    OBJECTIVE:     /80 (BP Location: Right arm, Patient Position: Sitting, Cuff Size: Adult Regular)  Pulse 78  Temp 98.8  F (37.1  C) (Tympanic)  Resp 16  SpO2 98%  There is no height or weight on file to calculate BMI.   GENERAL: healthy, alert and no distress  NECK: mild goiter  RESP: lungs clear to auscultation - no rales, rhonchi or wheezes  CV: regular rate and rhythm, normal S1 S2, no S3 or S4, no murmur, click or rub, no peripheral edema and peripheral pulses strong  PSYCH: mentation appears normal, affect normal/bright    Diagnostic Test Results:  none     ASSESSMENT/PLAN:     1. Hypothyroidism, unspecified type  Continue current therapy with a plan to check TSH June/July 2019 unless change in symptoms.           See Patient Instructions    DAMON Soler [facility] [place]

## 2018-11-12 ENCOUNTER — OFFICE VISIT (OUTPATIENT)
Dept: FAMILY MEDICINE | Facility: OTHER | Age: 28
End: 2018-11-12
Attending: NURSE PRACTITIONER
Payer: COMMERCIAL

## 2018-11-12 VITALS
RESPIRATION RATE: 16 BRPM | DIASTOLIC BLOOD PRESSURE: 80 MMHG | SYSTOLIC BLOOD PRESSURE: 126 MMHG | OXYGEN SATURATION: 98 % | HEART RATE: 78 BPM | TEMPERATURE: 98.8 F

## 2018-11-12 DIAGNOSIS — E03.9 HYPOTHYROIDISM, UNSPECIFIED TYPE: Primary | ICD-10-CM

## 2018-11-12 PROCEDURE — 99213 OFFICE O/P EST LOW 20 MIN: CPT | Performed by: NURSE PRACTITIONER

## 2018-11-12 ASSESSMENT — ANXIETY QUESTIONNAIRES
2. NOT BEING ABLE TO STOP OR CONTROL WORRYING: NOT AT ALL
4. TROUBLE RELAXING: NOT AT ALL
5. BEING SO RESTLESS THAT IT IS HARD TO SIT STILL: NOT AT ALL
6. BECOMING EASILY ANNOYED OR IRRITABLE: NOT AT ALL
7. FEELING AFRAID AS IF SOMETHING AWFUL MIGHT HAPPEN: NOT AT ALL
3. WORRYING TOO MUCH ABOUT DIFFERENT THINGS: NOT AT ALL
1. FEELING NERVOUS, ANXIOUS, OR ON EDGE: SEVERAL DAYS
IF YOU CHECKED OFF ANY PROBLEMS ON THIS QUESTIONNAIRE, HOW DIFFICULT HAVE THESE PROBLEMS MADE IT FOR YOU TO DO YOUR WORK, TAKE CARE OF THINGS AT HOME, OR GET ALONG WITH OTHER PEOPLE: NOT DIFFICULT AT ALL
GAD7 TOTAL SCORE: 1

## 2018-11-12 ASSESSMENT — PAIN SCALES - GENERAL: PAINLEVEL: NO PAIN (0)

## 2018-11-12 ASSESSMENT — PATIENT HEALTH QUESTIONNAIRE - PHQ9: SUM OF ALL RESPONSES TO PHQ QUESTIONS 1-9: 3

## 2018-11-12 NOTE — MR AVS SNAPSHOT
After Visit Summary   11/12/2018    Gale Adams    MRN: [id number]           Patient Information     Date Of Birth          1990        Visit Information        Provider Department      11/12/2018 11:40 AM Danii Landa APRN CNP [facility] [place] [facility]        Today's Diagnoses     Hypothyroidism, unspecified type    -  1      Care Instructions      Hypothyroidism       You have hypothyroidism. This means your thyroid gland is not making enough thyroid hormone. This hormone is vital to body growth and metabolism. If you don t make enough, many body processes slow down. This can cause symptoms throughout the body. Hypothyroidism can range from mild to severe. The most severe form is called myxedema.  There are a number of causes of hypothyroidism. A common cause is Hashimoto s disease. This disease causes the body s own immune system to attack the thyroid gland. When you have certain treatments, such as surgery to remove the thyroid gland, this can also cause hypothyroidism. Sometimes the thyroid gland is not functioning because of lack of stimulation from the pituitary gland.  Symptoms of hypothyroidism can include:    Fatigue    Trouble concentrating or thinking clearly; forgetfulness    Dry skin    Hair loss    Weight gain    Low tolerance to cold    Constipation    Depression    Personality changes    Tingling or prickling of the hands or feet    Heavy, absent, or irregular periods (women only)  Older adults may sometimes have other symptoms. These can include:    Muscle aches and weakness    Confusion    Incontinence (unable to control urine or stool)    Trouble moving around    Falling  Treatment for hypothyroidism involves taking thyroid hormone pills daily. These pills replace the hormone your thyroid doesn t make. You will likely need to take a daily pill for the rest of your life. Tips for taking this medicine are given below.  Home care  Tips for taking your  medicine    Take your thyroid hormone pills as prescribed by your healthcare provider. This is most often 1 pill a day on an empty stomach. Use a pillbox labeled with the days of the week. This will help you remember to take your pill each day.    Don t take products that contain iron and calcium or antacids within 4 hours of taking your thyroid hormone pills.    Don t take other medicines with your thyroid hormone pill without checking with your provider first.    Tell your provider if you have any side effects from your medicines that bother you, especially any chest pain or irregular heart beats.    Never change the dosage or stop taking your thyroid pills without talking to your provider first.  General care    Always talk with your provider before trying other medicines or treatments for your thyroid problem.    If you see other healthcare providers, be sure to let them know about your thyroid problem.    Let your healthcare provider know if you become pregnant because your dose of thyroid hormone will need to be adjusted.  Follow-up care  See your healthcare provider for checkups as advised. You may need regular tests to check the level of thyroid hormone in your blood.  When to seek medical advice  Call your healthcare provider right away if any of these occur:    New symptoms develop    Symptoms return, continue, or worsen even after treatment    Extreme fatigue    Puffy hands, face, or feet    Fast or irregular heartbeat    Confusion     Call 911  Call 911 if any of these occur:    Fainting    Chest pain    Shortness of breath or trouble breathing   Date Last Reviewed: 4/1/2018 2000-2018 The [employer]. [address] [place] [address]. All rights reserved. This information is not intended as a substitute for professional medical care. Always follow your healthcare professional's instructions.                Follow-ups after your visit        Follow-up notes from your care team      Return for check TSH in june/July 2019.      Who to contact     If you have questions or need follow up information about today's clinic visit or your schedule please contact [facility] [place] directly at [phone].  Normal or non-critical lab and imaging results will be communicated to you by MyChart, letter or phone within 4 business days after the clinic has received the results. If you do not hear from us within 7 days, please contact the clinic through MyChart or phone. If you have a critical or abnormal lab result, we will notify you by phone as soon as possible.  Submit refill requests through [employer] or call your pharmacy and they will forward the refill request to us. Please allow 3 business days for your refill to be completed.          Additional Information About Your Visit        [facility] Information     [employer] gives you secure access to your electronic health record. If you see a primary care provider, you can also send messages to your care team and make appointments. If you have questions, please call your primary care clinic.  If you do not have a primary care provider, please call [phone] and they will assist you.        Care EveryWhere ID     This is your Care EveryWhere ID. This could be used by other organizations to access your [facility] medical records  [id number]        Your Vitals Were     Pulse Temperature Respirations Pulse Oximetry          78 98.8  F (37.1  C) (Tympanic) 16 98%         Blood Pressure from Last 3 Encounters:   11/12/18 126/80   09/25/18 120/82   09/12/18 127/80    Weight from Last 3 Encounters:   09/25/18 203 lb 6.4 oz (92.3 kg)   07/25/18 198 lb (89.8 kg)   07/02/18 198 lb 12.8 oz (90.2 kg)              Today, you had the following     No orders found for display       Primary Care Provider Office Phone # Fax #    DAMON Aden [phone] [address] [place] [address]        Equal Access to Services      [facility]AN : Hadii aad ku yani Haney, waaxda luqadaha, qaybta kaalmada adeshelly, ramin contreras haybridgette gilmoremarinanaveed leyva . So [facility] [phone].    ATENCIÓN: Si habla español, tiene a hewitt disposición servicios gratuitos de asistencia lingüística. Jazmineame al [phone].    We comply with applicable federal civil rights laws and [place] laws. We do not discriminate on the basis of race, color, national origin, age, disability, sex, sexual orientation, or gender identity.            Thank you!     Thank you for choosing [facility]  for your care. Our goal is always to provide you with excellent care. Hearing back from our patients is one way we can continue to improve our services. Please take a few minutes to complete the written survey that you may receive in the mail after your visit with us. Thank you!             Your Updated Medication List - Protect others around you: Learn how to safely use, store and throw away your medicines at www.disposemymeds.org.          This list is accurate as of 11/12/18 12:26 PM.  Always use your most recent med list.                   Brand Name Dispense Instructions for use Diagnosis    letrozole 2.5 MG tablet    FEMARA    15 tablet    Take 3 tablets (7.5 mg) by mouth daily for 5 days Day 3-7 of menstrual cycle.    Female infertility       levothyroxine 100 MCG tablet    SYNTHROID/LEVOTHROID    90 tablet    Take 1 tablet (100 mcg) by mouth daily    Hypothyroidism, unspecified type       PRENATAL 19 Chew      Take 1 chew tab by mouth daily        triamcinolone 0.1 % ointment    KENALOG    80 g    Apply sparingly to affected area three times daily for 14 days.    Pityriasis rosea

## 2018-11-12 NOTE — PATIENT INSTRUCTIONS
Hypothyroidism       You have hypothyroidism. This means your thyroid gland is not making enough thyroid hormone. This hormone is vital to body growth and metabolism. If you don t make enough, many body processes slow down. This can cause symptoms throughout the body. Hypothyroidism can range from mild to severe. The most severe form is called myxedema.  There are a number of causes of hypothyroidism. A common cause is Hashimoto s disease. This disease causes the body s own immune system to attack the thyroid gland. When you have certain treatments, such as surgery to remove the thyroid gland, this can also cause hypothyroidism. Sometimes the thyroid gland is not functioning because of lack of stimulation from the pituitary gland.  Symptoms of hypothyroidism can include:    Fatigue    Trouble concentrating or thinking clearly; forgetfulness    Dry skin    Hair loss    Weight gain    Low tolerance to cold    Constipation    Depression    Personality changes    Tingling or prickling of the hands or feet    Heavy, absent, or irregular periods (women only)  Older adults may sometimes have other symptoms. These can include:    Muscle aches and weakness    Confusion    Incontinence (unable to control urine or stool)    Trouble moving around    Falling  Treatment for hypothyroidism involves taking thyroid hormone pills daily. These pills replace the hormone your thyroid doesn t make. You will likely need to take a daily pill for the rest of your life. Tips for taking this medicine are given below.  Home care  Tips for taking your medicine    Take your thyroid hormone pills as prescribed by your healthcare provider. This is most often 1 pill a day on an empty stomach. Use a pillbox labeled with the days of the week. This will help you remember to take your pill each day.    Don t take products that contain iron and calcium or antacids within 4 hours of taking your thyroid hormone pills.    Don t take other medicines with  your thyroid hormone pill without checking with your provider first.    Tell your provider if you have any side effects from your medicines that bother you, especially any chest pain or irregular heart beats.    Never change the dosage or stop taking your thyroid pills without talking to your provider first.  General care    Always talk with your provider before trying other medicines or treatments for your thyroid problem.    If you see other healthcare providers, be sure to let them know about your thyroid problem.    Let your healthcare provider know if you become pregnant because your dose of thyroid hormone will need to be adjusted.  Follow-up care  See your healthcare provider for checkups as advised. You may need regular tests to check the level of thyroid hormone in your blood.  When to seek medical advice  Call your healthcare provider right away if any of these occur:    New symptoms develop    Symptoms return, continue, or worsen even after treatment    Extreme fatigue    Puffy hands, face, or feet    Fast or irregular heartbeat    Confusion     Call 911  Call 911 if any of these occur:    Fainting    Chest pain    Shortness of breath or trouble breathing   Date Last Reviewed: 4/1/2018 2000-2018 [employer]. [address] [place] [address], [place] [address]. All rights reserved. This information is not intended as a substitute for professional medical care. Always follow your healthcare professional's instructions.

## 2018-11-12 NOTE — NURSING NOTE
"Chief Complaint   Patient presents with     Thyroid Problem       Initial /80 (BP Location: Right arm, Patient Position: Sitting, Cuff Size: Adult Regular)  Pulse 78  Temp 98.8  F (37.1  C) (Tympanic)  Resp 16  SpO2 98% Estimated body mass index is 32.83 kg/(m^2) as calculated from the following:    Height as of 7/25/18: 5' 6\" (1.676 m).    Weight as of 9/25/18: 203 lb 6.4 oz (92.3 kg).  Medication Reconciliation: complete    Nga Head LPN  "

## 2018-11-13 ASSESSMENT — ANXIETY QUESTIONNAIRES: GAD7 TOTAL SCORE: 1

## 2018-12-13 ENCOUNTER — OFFICE VISIT (OUTPATIENT)
Dept: CHIROPRACTIC MEDICINE | Facility: OTHER | Age: 28
End: 2018-12-13
Attending: CHIROPRACTOR
Payer: COMMERCIAL

## 2018-12-13 DIAGNOSIS — M99.03 SEGMENTAL AND SOMATIC DYSFUNCTION OF LUMBAR REGION: ICD-10-CM

## 2018-12-13 DIAGNOSIS — M99.02 SEGMENTAL AND SOMATIC DYSFUNCTION OF THORACIC REGION: ICD-10-CM

## 2018-12-13 DIAGNOSIS — M99.01 SEGMENTAL AND SOMATIC DYSFUNCTION OF CERVICAL REGION: Primary | ICD-10-CM

## 2018-12-13 DIAGNOSIS — M54.2 CERVICALGIA: ICD-10-CM

## 2018-12-13 PROCEDURE — 98941 CHIROPRACT MANJ 3-4 REGIONS: CPT | Mod: AT | Performed by: CHIROPRACTOR

## 2018-12-18 NOTE — PROGRESS NOTES
Subjective Finding:    Chief compalint: Patient presents with:  Back Pain: stiffness  Neck Pain  , Pain Scale: 2/10, Intensity: sharp, Duration: 2 weeks, Radiating: no.    Date of injury:     Activities that the pain restricts:   Home/household/hobbies/social activities: yes.  Work duties: yes.  Sleep: no.  Makes symptoms better: rest.  Makes symptoms worse: activity, lumbar extension and lumbar flexion.  Have you seen anyone else for the symptoms? No.  Work related: no.  Automobile related injury: no.    Objective and Assessment:    Posture Analysis:   High shoulder: .  Head tilt: .  High iliac crest: .  Head carriage: forward.  Thoracic Kyphosis: neutral.  Lumbar Lordosis: forward.    Lumbar Range of Motion: extension decreased.  Cervical Range of Motion: extension decreased.  Thoracic Range of Motion: flexion decreased and extension decreased.  Extremity Range of Motion: .    Palpation:   Quad lumb: bilateral, referred pain: no  Lev scapulae: sharp pain, referred pain: no    Segmental dysfunction pre-treatment and treatment area: C3, C6, T7, T9 and L5.    Assessment post-treatment:  Cervical: ROM increased.  Thoracic: ROM increased.  Lumbar: ROM increased.    Comments: .      Complicating Factors: .    Procedure(s):  CMT:  49857 Chiropractic manipulative treatment 3-4 regions performed   Cervical: Diversified, See above for level, Supine, Thoracic: Diversified, See above for level, Prone and Lumbar: Diversified, See above for level, Side posture    Modalities:  None performed this visit    Therapeutic procedures:  None    Plan:  Treatment plan: PRN.  Instructed patient: ice 20 minutes every other hour as needed.  Short term goals: increase ROM.  Long term goals: increase strength.  Prognosis: very good.

## 2019-01-15 ENCOUNTER — OFFICE VISIT (OUTPATIENT)
Dept: FAMILY MEDICINE | Facility: OTHER | Age: 29
End: 2019-01-15
Attending: NURSE PRACTITIONER
Payer: COMMERCIAL

## 2019-01-15 VITALS
TEMPERATURE: 99.3 F | SYSTOLIC BLOOD PRESSURE: 118 MMHG | WEIGHT: 206 LBS | BODY MASS INDEX: 33.25 KG/M2 | OXYGEN SATURATION: 97 % | HEART RATE: 86 BPM | DIASTOLIC BLOOD PRESSURE: 78 MMHG

## 2019-01-15 DIAGNOSIS — J02.9 ACUTE PHARYNGITIS, UNSPECIFIED ETIOLOGY: Primary | ICD-10-CM

## 2019-01-15 LAB
DEPRECATED S PYO AG THROAT QL EIA: NORMAL
SPECIMEN SOURCE: NORMAL

## 2019-01-15 PROCEDURE — 87081 CULTURE SCREEN ONLY: CPT | Performed by: NURSE PRACTITIONER

## 2019-01-15 PROCEDURE — 87880 STREP A ASSAY W/OPTIC: CPT | Performed by: NURSE PRACTITIONER

## 2019-01-15 PROCEDURE — 99213 OFFICE O/P EST LOW 20 MIN: CPT | Performed by: NURSE PRACTITIONER

## 2019-01-15 ASSESSMENT — PAIN SCALES - GENERAL: PAINLEVEL: SEVERE PAIN (6)

## 2019-01-15 NOTE — PROGRESS NOTES
SUBJECTIVE:   Gale Adams is a 28 year old female who presents to clinic today for the following health issues:      RESPIRATORY SYMPTOMS      Duration: 2 days     Description  sore throat and weakness    Severity: moderate    Accompanying signs and symptoms: None    History (predisposing factors):  none    Precipitating or alleviating factors: None    Therapies tried and outcome:  Lozenges and ibuprofen        Problem list and histories reviewed & adjusted, as indicated.  Additional history: as documented    Patient Active Problem List   Diagnosis     Swelling, mass, or lump in head and neck     Left knee injury     ACP (advance care planning)     Hypothyroidism, unspecified type     Female infertility     Past Surgical History:   Procedure Laterality Date     CARDIAC SURGERY      tachacardia in the past     SOFT TISSUE SURGERY      wisdom teeth extraction       Social History     Tobacco Use     Smoking status: Never Smoker     Smokeless tobacco: Never Used   Substance Use Topics     Alcohol use: No     Family History   Problem Relation Age of Onset     Cerebrovascular Disease Maternal Grandmother         CVA     Diabetes Maternal Grandmother      Breast Cancer Maternal Grandmother      Hypertension Father      Hyperlipidemia Mother      Depression Mother      Obesity Mother      Other Cancer Paternal Grandmother      Coronary Artery Disease No family hx of      Colon Cancer No family hx of      Prostate Cancer No family hx of      Anxiety Disorder No family hx of      Mental Illness No family hx of      Substance Abuse No family hx of      Anesthesia Reaction No family hx of      Asthma No family hx of      Osteoporosis No family hx of      Genetic Disorder No family hx of      Thyroid Disease No family hx of          Current Outpatient Medications   Medication Sig Dispense Refill     levothyroxine (SYNTHROID/LEVOTHROID) 100 MCG tablet Take 1 tablet (100 mcg) by mouth daily 90 tablet 3     Prenatal Vit-Fe  Fumarate-FA (PRENATAL 19) CHEW Take 1 chew tab by mouth daily       letrozole (FEMARA) 2.5 MG tablet Take 3 tablets (7.5 mg) by mouth daily for 5 days Day 3-7 of menstrual cycle. 15 tablet 1     Allergies   Allergen Reactions     Amoxicillin Rash       Reviewed and updated as needed this visit by clinical staff       Reviewed and updated as needed this visit by Provider         ROS:  CONSTITUTIONAL:chills, fatigue and fever low grade  INTEGUMENTARY/SKIN: NEGATIVE for worrisome rashes, moles or lesions  ENT/MOUTH: sinus pressure and sore throat  RESP:cough and SOB  CV: NEGATIVE for chest pain, palpitations or peripheral edema  GI: nausea  : denies dysuria  MUSCULOSKELETAL: generalized weakness    OBJECTIVE:     /78 (Patient Position: Sitting)   Pulse 86   Temp 99.3  F (37.4  C) (Tympanic)   Wt 93.4 kg (206 lb)   SpO2 97%   BMI 33.25 kg/m    Body mass index is 33.25 kg/m .   GENERAL: alert and no distress  HENT: normal cephalic/atraumatic, ear canals and TM's normal, nose and mouth without ulcers or lesions, oropharynx clear, oral mucous membranes moist and tonsillar erythema  RESP: lungs clear to auscultation - no rales, rhonchi or wheezes  CV: regular rate and rhythm, normal S1 S2, no S3 or S4, no murmur, click or rub, no peripheral edema and peripheral pulses strong  ABDOMEN: soft, nontender, no hepatosplenomegaly, no masses and bowel sounds normal  SKIN: no suspicious lesions or rashes  PSYCH: mentation appears normal, affect normal/bright    Diagnostic Test Results:  Results for orders placed or performed in visit on 01/15/19 (from the past 24 hour(s))   Rapid strep screen   Result Value Ref Range    Specimen Description Throat     Rapid Strep A Screen       NEGATIVE: No Group A streptococcal antigen detected by immunoassay, await culture report.       ASSESSMENT/PLAN:     1. Acute pharyngitis unspecified etiology  Discussed negative rapid strep, culture pending discussed symptomatic treatment.  Should notify us if develops fevers, symptoms worsen or any concerns. Gale agrees with plan   - Rapid strep screen  - Beta strep group A culture        See Patient Instructions    [facility] [place]

## 2019-01-15 NOTE — PATIENT INSTRUCTIONS
Patient Education     Self-Care for Sore Throats    Sore throats happen for many reasons, such as colds, allergies, and infections caused by viruses or bacteria. In any case, your throat becomes red and sore. Your goal for self-care is to reduce your discomfort while giving your throat a chance to heal.  Moisten and soothe your throat  Tips include the following:    Try a sip of water first thing after waking up.    Keep your throat moist by drinking 6 or more glasses of clear liquids every day.    Run a cool-air humidifier in your room overnight.    Avoid cigarette smoke.     Suck on throat lozenges, cough drops, hard candy, ice chips, or frozen fruit-juice bars. Use the sugar-free versions if your diet or medical condition requires them.  Gargle to ease irritation  Gargling every hour or 2 can ease irritation. Try gargling with 1 of these solutions:    1/4 teaspoon of salt in 1/2 cup of warm water    An over-the-counter anesthetic gargle  Use medicine for more relief  Over-the-counter medicine can reduce sore throat symptoms. Ask your pharmacist if you have questions about which medicine to use:    Ease pain with anesthetic sprays. Aspirin or an aspirin substitute also helps. Remember, never give aspirin to anyone 18 or younger, or if you are already taking blood thinners.     For sore throats caused by allergies, try antihistamines to block the allergic reaction.    Remember: unless a sore throat is caused by a bacterial infection, antibiotics won t help you.  Prevent future sore throats  Prevention tips include the following:    Stop smoking or reduce contact with secondhand smoke. Smoke irritates the tender throat lining.    Limit contact with pets and with allergy-causing substances, such as pollen and mold.    When you re around someone with a sore throat or cold, wash your hands often to keep viruses or bacteria from spreading.    Don t strain your vocal cords.  Call your healthcare provider  Contact your  healthcare provider if you have:    A temperature over 101 F (38.3 C)    White spots on the throat    Great difficulty swallowing    Trouble breathing    A skin rash    Recent exposure to someone else with strep bacteria    Severe hoarseness and swollen glands in the neck or jaw   Date Last Reviewed: 8/1/2016 2000-2018 [employer]. [address] [place], [place] [address]. All rights reserved. This information is not intended as a substitute for professional medical care. Always follow your healthcare professional's instructions.

## 2019-01-17 LAB
BACTERIA SPEC CULT: NORMAL
SPECIMEN SOURCE: NORMAL

## 2019-01-18 ENCOUNTER — TELEPHONE (OUTPATIENT)
Dept: FAMILY MEDICINE | Facility: OTHER | Age: 29
End: 2019-01-18

## 2019-01-18 DIAGNOSIS — J02.9 ACUTE PHARYNGITIS, UNSPECIFIED ETIOLOGY: Primary | ICD-10-CM

## 2019-01-18 RX ORDER — AZITHROMYCIN 250 MG/1
TABLET, FILM COATED ORAL
Qty: 6 TABLET | Refills: 0 | Status: SHIPPED | OUTPATIENT
Start: 2019-01-18 | End: 2019-02-01

## 2019-01-18 NOTE — TELEPHONE ENCOUNTER
Left message notifying patient that Rx was sent in.  Advised to call back with any questions or further concerns.

## 2019-01-18 NOTE — TELEPHONE ENCOUNTER
Received call from patient.  She was seen  1.15.19 for URI symptoms and sore throat.  She was told to notify if symptoms didn't go away.  She is still having sore throat.  Cough has gotten worse.  Denies fever or chest pain when she is not coughing.    She states she is having some body weakness.    She is requesting to be see before Wednesday of next week.    Do you need to see patient? Or since you just saw would you send in RX.  Please advise what you would like patient to do. Thank you!     Phone: [phone]

## 2019-01-18 NOTE — TELEPHONE ENCOUNTER
With her symptoms worsening I sent an order for Z-pac. 2 tabs today and 1 tablet daily for the next 4 days.    Danii JOSE [facility]  Family Nurse Practitioner

## 2019-01-28 ENCOUNTER — MYC MEDICAL ADVICE (OUTPATIENT)
Dept: OBGYN | Facility: OTHER | Age: 29
End: 2019-01-28

## 2019-01-29 NOTE — TELEPHONE ENCOUNTER
Sometimes autoimmune antibodies can cause interference on blood tests but not urine.  When they do cause interference it is usually a false positive/not negative.

## 2019-02-01 ENCOUNTER — OFFICE VISIT (OUTPATIENT)
Dept: OBGYN | Facility: OTHER | Age: 29
End: 2019-02-01
Attending: OBSTETRICS & GYNECOLOGY
Payer: COMMERCIAL

## 2019-02-01 VITALS
HEIGHT: 66 IN | SYSTOLIC BLOOD PRESSURE: 102 MMHG | WEIGHT: 200 LBS | BODY MASS INDEX: 32.14 KG/M2 | HEART RATE: 76 BPM | DIASTOLIC BLOOD PRESSURE: 60 MMHG

## 2019-02-01 DIAGNOSIS — N97.9 FEMALE INFERTILITY: ICD-10-CM

## 2019-02-01 PROCEDURE — 99213 OFFICE O/P EST LOW 20 MIN: CPT | Performed by: OBSTETRICS & GYNECOLOGY

## 2019-02-01 RX ORDER — LETROZOLE 2.5 MG/1
7.5 TABLET, FILM COATED ORAL DAILY
Qty: 15 TABLET | Refills: 1 | Status: SHIPPED | OUTPATIENT
Start: 2019-02-01 | End: 2019-07-02

## 2019-02-01 ASSESSMENT — MIFFLIN-ST. JEOR: SCORE: 1658.94

## 2019-02-01 ASSESSMENT — PAIN SCALES - GENERAL: PAINLEVEL: NO PAIN (0)

## 2019-02-01 NOTE — NURSING NOTE
"Chief Complaint   Patient presents with     Follow Up     Infertility       Initial /60 (BP Location: Left arm, Patient Position: Sitting, Cuff Size: Adult Large)   Pulse 76   Ht 1.676 m (5' 6\")   Wt 90.7 kg (200 lb)   LMP 01/31/2019 (Exact Date)   BMI 32.28 kg/m   Estimated body mass index is 32.28 kg/m  as calculated from the following:    Height as of this encounter: 1.676 m (5' 6\").    Weight as of this encounter: 90.7 kg (200 lb).  Medication Reconciliation: complete    RANCHO ORR LPN  "

## 2019-02-02 NOTE — PROGRESS NOTES
S:  F/u infertility  See my prior evals.  Nml w/u except anovulation.  Possible male factor but  had f/u with urology recently and told all was nml with SA.  We have not been able to achieve ovulation with clomid. We were successful with high dose Letrozole. She did not take last month and her period was 2 weeks late but started it yesterday.           Patient Active Problem List   Diagnosis     Swelling, mass, or lump in head and neck     Left knee injury     ACP (advance care planning)     Hypothyroidism, unspecified type     Female infertility            Past Medical History:   Diagnosis Date     Acute upper respiratory infections of unspecified site 01/03/2001     Need for prophylactic vaccination and inoculation against other specified disease 03/05/2003            Past Surgical History:   Procedure Laterality Date     CARDIAC SURGERY      tachacardia in the past     SOFT TISSUE SURGERY      wisdom teeth extraction            Social History     Tobacco Use     Smoking status: Never Smoker     Smokeless tobacco: Never Used   Substance Use Topics     Alcohol use: No            Family History   Problem Relation Age of Onset     Cerebrovascular Disease Maternal Grandmother         CVA     Diabetes Maternal Grandmother      Breast Cancer Maternal Grandmother      Hypertension Father      Hyperlipidemia Mother      Depression Mother      Obesity Mother      Other Cancer Paternal Grandmother      Coronary Artery Disease No family hx of      Colon Cancer No family hx of      Prostate Cancer No family hx of      Anxiety Disorder No family hx of      Mental Illness No family hx of      Substance Abuse No family hx of      Anesthesia Reaction No family hx of      Asthma No family hx of      Osteoporosis No family hx of      Genetic Disorder No family hx of      Thyroid Disease No family hx of                Allergies   Allergen Reactions     Amoxicillin Rash            Current Outpatient Medications   Medication  "Sig Dispense Refill     letrozole (FEMARA) 2.5 MG tablet Take 3 tablets (7.5 mg) by mouth daily Day 3-7 of menstrual cycle. 15 tablet 1     levothyroxine (SYNTHROID/LEVOTHROID) 100 MCG tablet Take 1 tablet (100 mcg) by mouth daily 90 tablet 3     Prenatal Vit-Fe Fumarate-FA (PRENATAL 19) CHEW Take 1 chew tab by mouth daily            Review Of Systems  Constitutional:  Denies fever  GI/ negative except as noted per hpi    O:   /60 (BP Location: Left arm, Patient Position: Sitting, Cuff Size: Adult Large)   Pulse 76   Ht 1.676 m (5' 6\")   Wt 90.7 kg (200 lb)   LMP 01/31/2019 (Exact Date)   BMI 32.28 kg/m    Gen:  NAD, A and O           A:P)  Primary infertility;  Pt may benefit from IUI.  If no pregnancy with this cycle of Letrozole recommend referral for more advance fertility services.  (Pt would prefer [place]).  Will update fertility w/u with TSH/prolactin given recent amenorrhea and day 21 progesterone with Letrozole this cycle to confirm ovulation.15 minutes were spent with the patient with greater than 50% of the visit spent in face-to-face counseling and coordination of care.          "

## 2019-02-20 DIAGNOSIS — N97.9 FEMALE INFERTILITY: ICD-10-CM

## 2019-02-20 LAB
PROGEST SERPL-MCNC: 1.1 NG/ML
PROLACTIN SERPL-MCNC: 6 UG/L (ref 3–27)
T4 FREE SERPL-MCNC: 1.22 NG/DL (ref 0.76–1.46)
TSH SERPL DL<=0.005 MIU/L-ACNC: 0.78 MU/L (ref 0.4–4)

## 2019-02-20 PROCEDURE — 84144 ASSAY OF PROGESTERONE: CPT | Mod: 90 | Performed by: OBSTETRICS & GYNECOLOGY

## 2019-02-20 PROCEDURE — 84443 ASSAY THYROID STIM HORMONE: CPT | Performed by: OBSTETRICS & GYNECOLOGY

## 2019-02-20 PROCEDURE — 99000 SPECIMEN HANDLING OFFICE-LAB: CPT | Performed by: OBSTETRICS & GYNECOLOGY

## 2019-02-20 PROCEDURE — 84439 ASSAY OF FREE THYROXINE: CPT | Performed by: OBSTETRICS & GYNECOLOGY

## 2019-02-20 PROCEDURE — 84146 ASSAY OF PROLACTIN: CPT | Mod: 90 | Performed by: OBSTETRICS & GYNECOLOGY

## 2019-02-20 PROCEDURE — 36415 COLL VENOUS BLD VENIPUNCTURE: CPT | Performed by: OBSTETRICS & GYNECOLOGY

## 2019-02-21 DIAGNOSIS — N97.9 FEMALE INFERTILITY: Primary | ICD-10-CM

## 2019-04-18 NOTE — MR AVS SNAPSHOT
"              After Visit Summary   2/22/2018    Gale Adams    MRN: [id number]           Patient Information     Date Of Birth          1990        Visit Information        Provider Department      2/22/2018 11:30 AM Lam Bravo MD [facility]        Today's Diagnoses     Absence of menstruation    -  1    Female infertility          Care Instructions    F/u 3 months          Follow-ups after your visit        Who to contact     If you have questions or need follow up information about today's clinic visit or your schedule please contact [facility] directly at [phone].  Normal or non-critical lab and imaging results will be communicated to you by SeamlessDocshart, letter or phone within 4 business days after the clinic has received the results. If you do not hear from us within 7 days, please contact the clinic through RentMineOnlinet or phone. If you have a critical or abnormal lab result, we will notify you by phone as soon as possible.  Submit refill requests through FanHero or call your pharmacy and they will forward the refill request to us. Please allow 3 business days for your refill to be completed.          Additional Information About Your Visit        MyChart Information     FanHero gives you secure access to your electronic health record. If you see a primary care provider, you can also send messages to your care team and make appointments. If you have questions, please call your primary care clinic.  If you do not have a primary care provider, please call [phone] and they will assist you.        Care EveryWhere ID     This is your Care EveryWhere ID. This could be used by other organizations to access your [facility] medical records  [id number]        Your Vitals Were     Pulse Height Pulse Oximetry BMI (Body Mass Index)          84 5' 6\" (1.676 m) 98% 29.86 kg/m2         Blood Pressure from Last 3 Encounters:   02/22/18 112/76   11/04/17 134/80   10/31/17 124/73    " Cardiac Electrophysiology Hospital Note Subjective:  
  
Asha Brooks is a 71 Tavon Gonzalez B, DO      
 ondansetron (ZOFRAN) injection 4 mg  4 mg IntraVENous Q4H PRN Tavon Gonzalez B, DO      
 Weight from Last 3 Encounters:   02/22/18 185 lb (83.9 kg)   10/31/17 185 lb (83.9 kg)   09/18/17 190 lb (86.2 kg)              Today, you had the following     No orders found for display         Today's Medication Changes          These changes are accurate as of 2/22/18  2:39 PM.  If you have any questions, ask your nurse or doctor.               Start taking these medicines.        Dose/Directions    clomiPHENE 50 MG tablet   Commonly known as:  CLOMID   Used for:  Female infertility, Absence of menstruation        Dose:  100 mg   Take 2 tablets (100 mg) by mouth daily Day 3-7 of menstrual cycle   Quantity:  2 tablet   Refills:  1       medroxyPROGESTERone 10 MG tablet   Commonly known as:  PROVERA   Used for:  Absence of menstruation        Dose:  10 mg   Take 1 tablet (10 mg) by mouth daily for 10 days   Quantity:  10 tablet   Refills:  1            Where to get your medicines      These medications were sent to [facility] [place]  [address]  [address] [place] [address]     Phone:  [phone]     clomiPHENE 50 MG tablet    medroxyPROGESTERone 10 MG tablet                Primary Care Provider Office Phone # Fax #    Danii WadeDAMON Grarison [facility] [phone]       [address] [place] [address]        Equal Access to Services     CHARLIE ROMAN : Hadii carolyn delvalle hadasho Soomaali, waaxda luqadaha, qaybta kaalmada adeegyada, ramin warren. So [facility] [phone].    ATENCIÓN: Si habla español, tiene a hewitt disposición servicios gratuitos de asistencia lingüística. Llame al [phone].    We comply with applicable federal civil rights laws and [place] laws. We do not discriminate on the basis of race, color, national origin, age, disability, sex, sexual orientation, or gender identity.            Thank you!     Thank you for choosing [facility]  for your care. Our goal is always to provide you with excellent care.   [phone]  
 Hearing back from our patients is one way we can continue to improve our services. Please take a few minutes to complete the written survey that you may receive in the mail after your visit with us. Thank you!             Your Updated Medication List - Protect others around you: Learn how to safely use, store and throw away your medicines at www.disposemymeds.org.          This list is accurate as of 2/22/18  2:39 PM.  Always use your most recent med list.                   Brand Name Dispense Instructions for use Diagnosis    clomiPHENE 50 MG tablet    CLOMID    2 tablet    Take 2 tablets (100 mg) by mouth daily Day 3-7 of menstrual cycle    Female infertility, Absence of menstruation       levothyroxine 100 MCG tablet    SYNTHROID/LEVOTHROID    30 tablet    TAKE 1 TABLET BY MOUTH DAILY    Hypothyroidism, unspecified type       medroxyPROGESTERone 10 MG tablet    PROVERA    10 tablet    Take 1 tablet (10 mg) by mouth daily for 10 days    Absence of menstruation          Suresh Verduzco, ANDREINAP-C 9 [facility] Santana Farah M.D. Electrophysiology/Cardiology [address] [facility] [place] [facility], [address], [address] [place] [address] 
[phone]

## 2019-07-02 ENCOUNTER — OFFICE VISIT (OUTPATIENT)
Dept: FAMILY MEDICINE | Facility: OTHER | Age: 29
End: 2019-07-02
Attending: NURSE PRACTITIONER
Payer: COMMERCIAL

## 2019-07-02 VITALS
DIASTOLIC BLOOD PRESSURE: 68 MMHG | SYSTOLIC BLOOD PRESSURE: 92 MMHG | BODY MASS INDEX: 32.44 KG/M2 | OXYGEN SATURATION: 97 % | WEIGHT: 201 LBS | HEART RATE: 67 BPM

## 2019-07-02 DIAGNOSIS — D22.9 ATYPICAL MOLE: Primary | ICD-10-CM

## 2019-07-02 PROCEDURE — 99212 OFFICE O/P EST SF 10 MIN: CPT | Performed by: NURSE PRACTITIONER

## 2019-07-02 RX ORDER — METFORMIN HYDROCHLORIDE 750 MG/1
750 TABLET, EXTENDED RELEASE ORAL 2 TIMES DAILY
COMMUNITY
Start: 2019-03-01 | End: 2020-09-09

## 2019-07-02 RX ORDER — ACETAMINOPHEN 160 MG
2000 TABLET,DISINTEGRATING ORAL DAILY
COMMUNITY

## 2019-07-02 ASSESSMENT — PAIN SCALES - GENERAL: PAINLEVEL: NO PAIN (0)

## 2019-07-02 NOTE — PATIENT INSTRUCTIONS
Patient Education     Monthly Mole Check Chart  Anyone can get skin cancer. It doesn t matter what your skin color is. Doing a monthly skin check is an important way to spot early signs of melanoma. Melanoma is the deadliest type of skin cancer. But if it s found early, it can be treated. Regular skin checks can help you track any changes in your skin.  Getting started  Each month, check your body for any spots such as freckles, age spots, and moles. Use this sheet to help you by doing the followin. Number each spot you find on the images below.  2. Record the details for each spot, along with the date, on the chart at the bottom of the page.  3. Keep all of your completed charts. This will help you track any changes in your skin over time.  What to look for  When doing a skin check, be sure to use the ABCDEs of melanoma. This means checking spots and moles for the following:      Asymmetry. One half of the mole is not like the other half.    Border. The edges are not smooth but ragged, notched, or blurred.    Color. Color varies from 1 part of the mole to another and may be tan, brown, or black. In some cases the color can be white, red, or blue.    Diameter. The mole is larger than 6 mm (size of a pencil eraser).    Evolving. The mole is getting larger or changing its shape or color.  How to check  Stand before a full-length mirror and check all parts of your body. For spots on your back or other areas you can't see, have a family member or friend do this for you. Or you can also use a hand mirror to check hard-to-see areas such as your back, buttocks, back of the neck, and scalp. To get a better look when checking your scalp, part your hair.  When to call your healthcare provider  Call your healthcare provider if any of your moles:    Hurt    Itch    Ooze    Bleed    Thicken    Become crusty    Show any of the ABCDEs of melanoma  Monthly Skin Check Chart  Date       Mole #    Asymmetry:  What is the mole's  shape? Border of mole Color of mole Diameter of mole Evolving: How has mole changed?                                                                                                   Date Last Reviewed: 3/1/2017    5366-8595 [employer]. [address] [place] [address], [place] [address]. All rights reserved. This information is not intended as a substitute for professional medical care. Always follow your healthcare professional's instructions.

## 2019-07-02 NOTE — NURSING NOTE
"Chief Complaint   Patient presents with     Mole       Initial BP 92/68 (Patient Position: Sitting)   Pulse 67   Wt 91.2 kg (201 lb)   SpO2 97%   BMI 32.44 kg/m   Estimated body mass index is 32.44 kg/m  as calculated from the following:    Height as of 2/1/19: 1.676 m (5' 6\").    Weight as of this encounter: 91.2 kg (201 lb).  Medication Reconciliation: complete  "

## 2019-07-02 NOTE — PROGRESS NOTES
Subjective     Gale Adams is a 28 year old female who presents to clinic today for the following health issues:    HPI   Mole       Duration: lifetime    Description (location/character/radiation): mole on left thigh    Intensity:  NA    Accompanying signs and symptoms: darker in the middle, not round in appearance, feels a lump when pressing on it    History (similar episodes/previous evaluation): None    Precipitating or alleviating factors: None    Therapies tried and outcome: None         Patient Active Problem List   Diagnosis     Swelling, mass, or lump in head and neck     Left knee injury     ACP (advance care planning)     Hypothyroidism, unspecified type     Female infertility     Past Surgical History:   Procedure Laterality Date     CARDIAC SURGERY      tachacardia in the past     SOFT TISSUE SURGERY      wisdom teeth extraction       Social History     Tobacco Use     Smoking status: Never Smoker     Smokeless tobacco: Never Used   Substance Use Topics     Alcohol use: No     Family History   Problem Relation Age of Onset     Cerebrovascular Disease Maternal Grandmother         CVA     Diabetes Maternal Grandmother      Breast Cancer Maternal Grandmother      Hypertension Father      Hyperlipidemia Mother      Depression Mother      Obesity Mother      Other Cancer Paternal Grandmother      Coronary Artery Disease No family hx of      Colon Cancer No family hx of      Prostate Cancer No family hx of      Anxiety Disorder No family hx of      Mental Illness No family hx of      Substance Abuse No family hx of      Anesthesia Reaction No family hx of      Asthma No family hx of      Osteoporosis No family hx of      Genetic Disorder No family hx of      Thyroid Disease No family hx of          Current Outpatient Medications   Medication Sig Dispense Refill     Cholecalciferol (VITAMIN D3) 2000 units CAPS Take 2,000 Units by mouth daily       levothyroxine (SYNTHROID/LEVOTHROID) 100 MCG tablet Take 1  "tablet (100 mcg) by mouth daily 90 tablet 3     metFORMIN (GLUCOPHAGE-XR) 750 MG 24 hr tablet Take 750 mg by mouth 2 times daily       Prenatal Vit-Fe Fumarate-FA (PRENATAL 19) CHEW Take 1 chew tab by mouth daily       Allergies   Allergen Reactions     Amoxicillin Rash         Reviewed and updated as needed this visit by Provider         Review of Systems   ROS COMP: CONSTITUTIONAL: NEGATIVE for fever, chills, change in weight  INTEGUMENTARY/SKIN: left outer thigh mole changes  RESP: NEGATIVE for significant cough or SOB  CV: NEGATIVE for chest pain, palpitations or peripheral edema      Objective    BP 92/68 (Patient Position: Sitting)   Pulse 67   Wt 91.2 kg (201 lb)   SpO2 97%   BMI 32.44 kg/m    Body mass index is 32.44 kg/m .  Physical Exam   GENERAL: healthy, alert and no distress  RESP: regular non-labored  CV: regular rates and rhythm and no peripheral edema  SKIN: multicolor and slight irregularity mole - left lateral thigh  PSYCH: mentation appears normal, affect normal/bright    Diagnostic Test Results:  Labs reviewed in Epic        Assessment & Plan     1. Atypical mole  With new onset of mole, multicolor, and slight irregular shape plan to schedule an appointment to have mole removed. Gale has a wedding coming up and plans to schedule later this summer or early fall        BMI:   Estimated body mass index is 32.44 kg/m  as calculated from the following:    Height as of 2/1/19: 1.676 m (5' 6\").    Weight as of this encounter: 91.2 kg (201 lb).           See Patient Instructions    Return for mole removal .    DAMON Soler [facility] [place]      "

## 2019-08-29 DIAGNOSIS — E03.9 HYPOTHYROIDISM, UNSPECIFIED TYPE: ICD-10-CM

## 2019-08-30 RX ORDER — LEVOTHYROXINE SODIUM 100 UG/1
TABLET ORAL
Qty: 90 TABLET | Refills: 0 | Status: SHIPPED | OUTPATIENT
Start: 2019-08-30 | End: 2020-01-08

## 2019-09-11 ENCOUNTER — OFFICE VISIT (OUTPATIENT)
Dept: FAMILY MEDICINE | Facility: OTHER | Age: 29
End: 2019-09-11
Attending: NURSE PRACTITIONER
Payer: COMMERCIAL

## 2019-09-11 VITALS
WEIGHT: 201 LBS | TEMPERATURE: 98.4 F | OXYGEN SATURATION: 99 % | DIASTOLIC BLOOD PRESSURE: 82 MMHG | HEART RATE: 73 BPM | BODY MASS INDEX: 32.44 KG/M2 | RESPIRATION RATE: 18 BRPM | SYSTOLIC BLOOD PRESSURE: 131 MMHG

## 2019-09-11 DIAGNOSIS — D22.9 ATYPICAL MOLE: Primary | ICD-10-CM

## 2019-09-11 PROCEDURE — 88305 TISSUE EXAM BY PATHOLOGIST: CPT | Mod: TC | Performed by: NURSE PRACTITIONER

## 2019-09-11 PROCEDURE — 11104 PUNCH BX SKIN SINGLE LESION: CPT | Performed by: NURSE PRACTITIONER

## 2019-09-11 ASSESSMENT — PAIN SCALES - GENERAL: PAINLEVEL: NO PAIN (0)

## 2019-09-11 NOTE — PROGRESS NOTES
Subjective: Gale Adams a 29 year old female who presents today for lesion removal. The lesion(s) is/are located on the upper legs, number 1 and measures 1cm She The patient reports the lesion is asymptomatic and denies other significant symptoms on ROS. Medications reviewed.    Pause for the cause has been completed prior to the prceedure.   1. Gale was identified by both name and date of birth   2. The correct site was identified   3. Site was marked by provider    4. Written informed consent correct and signed or verbal authorization  to proceed was obtained   5. Verifed necessary supplies, equipment, and diagnostics are available    6. Time out was performed immediately prior to procedure    Objective: The lesion(s) is/are of the above mentioned size and location and is/are atypical size and color. The area was prepped and appropriately anesthetized. Using the usual technique, punch biopsy size 4 mm was performed. An appropriate dressing was applied. The procedure was well tolerated and without complications.     Assessment: atypical nevus    Plan: Follow up: The specimen is labelled and sent to pathology for evaluation.. Wound care instructions provided.  Patient was instructed to be alert for any signs of cutaneous infection.

## 2019-09-11 NOTE — PROGRESS NOTES
/82   Pulse 73   Temp 98.4  F (36.9  C) (Tympanic)   Resp 18   Wt 91.2 kg (201 lb)   SpO2 99%   BMI 32.44 kg/m

## 2019-09-11 NOTE — PATIENT INSTRUCTIONS
Patient Education     Understanding Mole Excision  Moles are skin growths that are darker than the surrounding skin. They are common and are not normally a problem. But moles can sometimes cause problems. In certain cases, a type of skin cancer called melanoma can grow in or near the mole. In other cases, a mole may be bothersome. In either case, removal (excision) of a problem mole can be done.  Why mole excision is done  Your healthcare provider may do a mole excision for one or more reasons:    Part or all of a suspicious mole may be removed to check it for cancer.    A mole that is constantly rubbed by clothing or irritated in other ways may be removed to help make you more comfortable.    A mole that is large or on a visible body part can be removed for cosmetic reasons.  How mole excision is done  Removing a mole is often done in the healthcare provider s office. You usually go home the same day.    The area is cleaned. It is then injected with medicine (anesthetic) to numb it.    The provider cuts out the mole. He or she may also remove a certain amount of healthy tissue around the mole to make sure the margins are clear of any dangerous cells.    If needed, the incision may be closed with sutures or staples.  Risks of mole excision    Damage to nearby nerves    Infection of the incision    Keloid or too much scar tissue forms    Pain in the area    Recurrence of the mole    Scarring  Preventing skin cancer  To help protect yourself from skin cancer:    Check your skin regularly for changes in your moles and for new moles.    See your healthcare provider if you have a mole that bleeds, itches, or changes in size, color, or shape.    If you have many moles or have a family history of skin cancer, have moles checked by your healthcare provider at least once a year.    Use clothing and sunscreen that is SPF 30 or higher to protect your skin from the sun.    Never use tanning beds.   Date Last Reviewed:  5/1/2016 2000-2018 [employer]. [address] [place] [address], [place] [address]. All rights reserved. This information is not intended as a substitute for professional medical care. Always follow your healthcare professional's instructions.

## 2019-09-11 NOTE — NURSING NOTE
"Chief Complaint   Patient presents with     Lesion Removal     left upper leg       Initial /82   Pulse 73   Temp 98.4  F (36.9  C) (Tympanic)   Resp 18   Wt 91.2 kg (201 lb)   SpO2 99%   BMI 32.44 kg/m   Estimated body mass index is 32.44 kg/m  as calculated from the following:    Height as of 2/1/19: 1.676 m (5' 6\").    Weight as of this encounter: 91.2 kg (201 lb).  Medication Reconciliation: complete   Na aBrkley LPN      "

## 2019-09-13 LAB — COPATH REPORT: NORMAL

## 2019-12-04 PROBLEM — E06.3 HASHIMOTO'S DISEASE: Status: ACTIVE | Noted: 2018-10-26

## 2019-12-04 PROBLEM — E06.3 HYPOTHYROIDISM DUE TO HASHIMOTO'S THYROIDITIS: Status: ACTIVE | Noted: 2018-10-26

## 2019-12-09 NOTE — PROGRESS NOTES
Subjective     Gale Adams is a 29 year old female who presents to clinic today for the following health issues:    HPI   Lesion to left leg      Duration: first part in October,unknown when she got it    Description (location/character/radiation): left thigh    Intensity: none    Accompanying signs and symptoms: none    History (similar episodes/previous evaluation): None    Precipitating or alleviating factors: family history, an uncle and aunt had lesions removed    Therapies tried and outcome: had part of lesion removed in October, back today to have more of the lesion removed           Subjective: Gale Adams a 29 year old female who presents today for lesion removal. The lesion is located on the left leg,  and measures 0.3cm.  She denies other significant symptoms on ROS. Medications reviewed.    Objective: The area was prepped and appropriately anesthetized. Using the usual technique, full thickness elliptical excision in total was performed. The total area excised, including lesion and margins. An appropriate  dressing was applied.  The procedure was well tolerated and without complications. Specimen was sent.    Assessment: atypical nevus    Plan: Follow up: The specimen is labelled and sent to pathology for evaluation., Return for suture removal in 7 days. Wound care instructions provided.  Patient was instructed to be alert for any signs of cutaneous infection.

## 2019-12-16 ENCOUNTER — OFFICE VISIT (OUTPATIENT)
Dept: FAMILY MEDICINE | Facility: OTHER | Age: 29
End: 2019-12-16
Attending: NURSE PRACTITIONER
Payer: COMMERCIAL

## 2019-12-16 VITALS
HEART RATE: 68 BPM | BODY MASS INDEX: 30.67 KG/M2 | SYSTOLIC BLOOD PRESSURE: 118 MMHG | OXYGEN SATURATION: 98 % | WEIGHT: 190 LBS | DIASTOLIC BLOOD PRESSURE: 68 MMHG | TEMPERATURE: 98.4 F

## 2019-12-16 DIAGNOSIS — D22.9 ATYPICAL MOLE: Primary | ICD-10-CM

## 2019-12-16 PROCEDURE — 88305 TISSUE EXAM BY PATHOLOGIST: CPT | Mod: TC | Performed by: NURSE PRACTITIONER

## 2019-12-16 PROCEDURE — 11400 EXC TR-EXT B9+MARG 0.5 CM<: CPT | Performed by: NURSE PRACTITIONER

## 2019-12-16 ASSESSMENT — PAIN SCALES - GENERAL: PAINLEVEL: NO PAIN (0)

## 2019-12-16 NOTE — PATIENT INSTRUCTIONS
Patient Education     Understanding Mole Excision  Moles are skin growths that are darker than the surrounding skin. They are common and are not normally a problem. But moles can sometimes cause problems. In certain cases, a type of skin cancer called melanoma can grow in or near the mole. In other cases, a mole may be bothersome. In either case, removal (excision) of a problem mole can be done.  Why mole excision is done  Your healthcare provider may do a mole excision for one or more reasons:    Part or all of a suspicious mole may be removed to check it for cancer.    A mole that is constantly rubbed by clothing or irritated in other ways may be removed to help make you more comfortable.    A mole that is large or on a visible body part can be removed for cosmetic reasons.  How mole excision is done  Removing a mole is often done in the healthcare provider s office. You usually go home the same day.    The area is cleaned. It is then injected with medicine (anesthetic) to numb it.    The provider cuts out the mole. He or she may also remove a certain amount of healthy tissue around the mole to make sure the margins are clear of any dangerous cells.    If needed, the incision may be closed with sutures or staples.  Risks of mole excision    Damage to nearby nerves    Infection of the incision    Keloid or too much scar tissue forms    Pain in the area    Recurrence of the mole    Scarring  Preventing skin cancer  To help protect yourself from skin cancer:    Check your skin regularly for changes in your moles and for new moles.    See your healthcare provider if you have a mole that bleeds, itches, or changes in size, color, or shape.    If you have many moles or have a family history of skin cancer, have moles checked by your healthcare provider at least once a year.    Use clothing and sunscreen that is SPF 30 or higher to protect your skin from the sun.    Never use tanning beds.   Date Last Reviewed:  5/1/2016 2000-2018 [employer]. [address] [place] [address], [place] [address]. All rights reserved. This information is not intended as a substitute for professional medical care. Always follow your healthcare professional's instructions.

## 2019-12-16 NOTE — NURSING NOTE
"Chief Complaint   Patient presents with     Lesion Removal     left thigh       Initial /68 (BP Location: Left arm, Patient Position: Chair, Cuff Size: Adult Regular)   Pulse 68   Temp 98.4  F (36.9  C) (Tympanic)   Wt 86.2 kg (190 lb)   LMP 10/16/2019   SpO2 98%   BMI 30.67 kg/m   Estimated body mass index is 30.67 kg/m  as calculated from the following:    Height as of 2/1/19: 1.676 m (5' 6\").    Weight as of this encounter: 86.2 kg (190 lb).  Medication Reconciliation: complete  Melani Rosenthal LPN  "

## 2019-12-18 LAB — COPATH REPORT: NORMAL

## 2020-01-02 ENCOUNTER — OFFICE VISIT (OUTPATIENT)
Dept: FAMILY MEDICINE | Facility: OTHER | Age: 30
End: 2020-01-02
Attending: FAMILY MEDICINE
Payer: COMMERCIAL

## 2020-01-02 VITALS
RESPIRATION RATE: 20 BRPM | BODY MASS INDEX: 30.67 KG/M2 | OXYGEN SATURATION: 96 % | DIASTOLIC BLOOD PRESSURE: 66 MMHG | TEMPERATURE: 99 F | HEART RATE: 76 BPM | WEIGHT: 190 LBS | SYSTOLIC BLOOD PRESSURE: 104 MMHG

## 2020-01-02 DIAGNOSIS — N30.00 ACUTE CYSTITIS WITHOUT HEMATURIA: ICD-10-CM

## 2020-01-02 DIAGNOSIS — R82.90 ABNORMAL URINE FINDINGS: ICD-10-CM

## 2020-01-02 DIAGNOSIS — R30.0 DYSURIA: Primary | ICD-10-CM

## 2020-01-02 DIAGNOSIS — N39.0 URINARY TRACT INFECTION: Primary | ICD-10-CM

## 2020-01-02 LAB
ALBUMIN UR-MCNC: 30 MG/DL
APPEARANCE UR: CLEAR
BACTERIA #/AREA URNS HPF: ABNORMAL /HPF
BILIRUB UR QL STRIP: NEGATIVE
COLOR UR AUTO: ABNORMAL
GLUCOSE UR STRIP-MCNC: NEGATIVE MG/DL
HGB UR QL STRIP: ABNORMAL
KETONES UR STRIP-MCNC: NEGATIVE MG/DL
LEUKOCYTE ESTERASE UR QL STRIP: ABNORMAL
MUCOUS THREADS #/AREA URNS LPF: PRESENT /LPF
NITRATE UR QL: NEGATIVE
PH UR STRIP: 6.5 PH (ref 4.7–8)
RBC #/AREA URNS AUTO: 43 /HPF (ref 0–2)
SOURCE: ABNORMAL
SP GR UR STRIP: 1.02 (ref 1–1.03)
UROBILINOGEN UR STRIP-MCNC: NORMAL MG/DL (ref 0–2)
WBC #/AREA URNS AUTO: 37 /HPF (ref 0–5)

## 2020-01-02 PROCEDURE — 99213 OFFICE O/P EST LOW 20 MIN: CPT | Performed by: FAMILY MEDICINE

## 2020-01-02 PROCEDURE — 87086 URINE CULTURE/COLONY COUNT: CPT | Performed by: FAMILY MEDICINE

## 2020-01-02 PROCEDURE — 87088 URINE BACTERIA CULTURE: CPT | Performed by: FAMILY MEDICINE

## 2020-01-02 PROCEDURE — 81001 URINALYSIS AUTO W/SCOPE: CPT | Performed by: FAMILY MEDICINE

## 2020-01-02 RX ORDER — SULFAMETHOXAZOLE/TRIMETHOPRIM 800-160 MG
1 TABLET ORAL 2 TIMES DAILY
Qty: 10 TABLET | Refills: 0 | Status: SHIPPED | OUTPATIENT
Start: 2020-01-02 | End: 2020-02-05

## 2020-01-02 ASSESSMENT — PAIN SCALES - GENERAL: PAINLEVEL: SEVERE PAIN (6)

## 2020-01-02 NOTE — PROGRESS NOTES
Subjective     Gale Adams is a 29 year old female who presents to clinic today for the following health issues:    HPI   URINARY TRACT SYMPTOMS      Duration: 3 days    Description  frequency and back pain    Intensity:  severe    Accompanying signs and symptoms:  Fever/chills: no   Flank pain YES  Nausea and vomiting: YES- nausea  Vaginal symptoms: none  Abdominal/Pelvic Pain: no     History  History of frequent UTI's: YES- 10 - 12 years ago  History of kidney stones: no   Sexually Active: YES  Possibility of pregnancy: Yes    Precipitating or alleviating factors: None    Therapies tried and outcome: ibuprofen and heating pad   Outcome: Did not help        Patient Active Problem List   Diagnosis     Swelling, mass, or lump in head and neck     Left knee injury     ACP (advance care planning)     Hypothyroidism, unspecified type     Female infertility     Hashimoto's disease     Hypothyroidism due to Hashimoto's thyroiditis     Past Surgical History:   Procedure Laterality Date     CARDIAC SURGERY      tachacardia in the past     SOFT TISSUE SURGERY      wisdom teeth extraction       Social History     Tobacco Use     Smoking status: Never Smoker     Smokeless tobacco: Never Used   Substance Use Topics     Alcohol use: No     Family History   Problem Relation Age of Onset     Cerebrovascular Disease Maternal Grandmother         CVA     Diabetes Maternal Grandmother      Breast Cancer Maternal Grandmother      Hypertension Father      Hyperlipidemia Mother      Depression Mother      Obesity Mother      Other Cancer Paternal Grandmother      Coronary Artery Disease No family hx of      Colon Cancer No family hx of      Prostate Cancer No family hx of      Anxiety Disorder No family hx of      Mental Illness No family hx of      Substance Abuse No family hx of      Anesthesia Reaction No family hx of      Asthma No family hx of      Osteoporosis No family hx of      Genetic Disorder No family hx of      Thyroid  Disease No family hx of          Current Outpatient Medications   Medication Sig Dispense Refill     Cholecalciferol (VITAMIN D3) 2000 units CAPS Take 2,000 Units by mouth daily       levothyroxine (SYNTHROID/LEVOTHROID) 100 MCG tablet TAKE 1 TABLET BY MOUTH DAILY 90 tablet 0     metFORMIN (GLUCOPHAGE-XR) 750 MG 24 hr tablet Take 750 mg by mouth 2 times daily       Prenatal Vit-Fe Fumarate-FA (PRENATAL 19) CHEW Take 1 chew tab by mouth daily       sulfamethoxazole-trimethoprim (BACTRIM DS/SEPTRA DS) 800-160 MG tablet Take 1 tablet by mouth 2 times daily for 5 days 10 tablet 0     Allergies   Allergen Reactions     Amoxicillin Rash     BP Readings from Last 3 Encounters:   01/02/20 104/66   12/16/19 118/68   09/11/19 131/82    Wt Readings from Last 3 Encounters:   01/02/20 86.2 kg (190 lb)   12/16/19 86.2 kg (190 lb)   09/11/19 91.2 kg (201 lb)                      Reviewed and updated as needed this visit by Provider         Review of Systems   ROS COMP: Constitutional, HEENT, cardiovascular, pulmonary, gi and gu systems are negative, except as otherwise noted.      Objective    /66 (BP Location: Left arm, Patient Position: Chair, Cuff Size: Adult Regular)   Pulse 76   Temp 99  F (37.2  C) (Tympanic)   Resp 20   Wt 86.2 kg (190 lb)   SpO2 96%   BMI 30.67 kg/m    Body mass index is 30.67 kg/m .  Physical Exam   GENERAL APPEARANCE: healthy, alert and no distress  MS: slight tenderness of the left flank area with palpation  SKIN: no suspicious lesions or rashes  NEURO: Normal strength and tone, mentation intact and speech normal  PSYCH: mentation appears normal and affect normal/bright    Results for orders placed or performed in visit on 01/02/20   UA reflex to Microscopic and Culture - [place]     Status: Abnormal   Result Value Ref Range    Color Urine Light Yellow     Appearance Urine Clear     Glucose Urine Negative NEG^Negative mg/dL    Bilirubin Urine Negative NEG^Negative    Ketones Urine Negative  "NEG^Negative mg/dL    Specific Gravity Urine 1.017 1.003 - 1.035    Blood Urine Small (A) NEG^Negative    pH Urine 6.5 4.7 - 8.0 pH    Protein Albumin Urine 30 (A) NEG^Negative mg/dL    Urobilinogen mg/dL Normal 0.0 - 2.0 mg/dL    Nitrite Urine Negative NEG^Negative    Leukocyte Esterase Urine Moderate (A) NEG^Negative    Source Midstream Urine     RBC Urine 43 (H) 0 - 2 /HPF    WBC Urine 37 (H) 0 - 5 /HPF    Bacteria Urine Few (A) NEG^Negative /HPF    Mucous Urine Present (A) NEG^Negative /LPF             Assessment & Plan     1. Dysuria    - UA reflex to Microscopic and Culture - [place]    2. Abnormal urine findings    - Urine Culture Aerobic Bacterial    3. Acute cystitis without hematuria  Treat with septra DS bid for 5 days  - sulfamethoxazole-trimethoprim (BACTRIM DS/SEPTRA DS) 800-160 MG tablet; Take 1 tablet by mouth 2 times daily for 5 days  Dispense: 10 tablet; Refill: 0     BMI:   Estimated body mass index is 30.67 kg/m  as calculated from the following:    Height as of 2/1/19: 1.676 m (5' 6\").    Weight as of this encounter: 86.2 kg (190 lb).               Return if symptoms worsen or fail to improve.    Nohelia Solomon MD  [facility] [place]      "

## 2020-01-02 NOTE — NURSING NOTE
"Chief Complaint   Patient presents with     UTI       Initial /66 (BP Location: Left arm, Patient Position: Chair, Cuff Size: Adult Regular)   Pulse 76   Temp 99  F (37.2  C) (Tympanic)   Resp 20   Wt 86.2 kg (190 lb)   SpO2 96%   BMI 30.67 kg/m   Estimated body mass index is 30.67 kg/m  as calculated from the following:    Height as of 2/1/19: 1.676 m (5' 6\").    Weight as of this encounter: 86.2 kg (190 lb).  Medication Reconciliation: complete  Juana Parr LPN    "

## 2020-01-04 LAB
BACTERIA SPEC CULT: ABNORMAL
SPECIMEN SOURCE: ABNORMAL

## 2020-02-04 NOTE — PROGRESS NOTES
SUBJECTIVE:   CC: Gale Adams is an 29 year old woman who presents for preventive health visit.     Healthy Habits:    Do you get at least three servings of calcium containing foods daily (dairy, green leafy vegetables, etc.)? yes    Amount of exercise or daily activities, outside of work: 3 day(s) per week    Problems taking medications regularly No    Medication side effects: No    Have you had an eye exam in the past two years? yes    Do you see a dentist twice per year? yes    Do you have sleep apnea, excessive snoring or daytime drowsiness?no      Hypothyroidism Follow-up      Since last visit, patient describes the following symptoms: Weight stable, no hair loss, no skin changes, no constipation, no loose stools      Today's PHQ-2 Score:   PHQ-2 ( 1999 [employer]) 6/12/2015 6/12/2015   Q1: Little interest or pleasure in doing things 0 0   Q2: Feeling down, depressed or hopeless 0 0   PHQ-2 Score 0 0       Abuse: Current or Past(Physical, Sexual or Emotional)- No  Do you feel safe in your environment? Yes        Social History     Tobacco Use     Smoking status: Never Smoker     Smokeless tobacco: Never Used   Substance Use Topics     Alcohol use: No     If you drink alcohol do you typically have >3 drinks per day or >7 drinks per week? No                     Reviewed orders with patient.  Reviewed health maintenance and updated orders accordingly - Yes  Lab work is in process  BP Readings from Last 3 Encounters:   02/05/20 122/62   01/02/20 104/66   12/16/19 118/68    Wt Readings from Last 3 Encounters:   02/05/20 86.2 kg (190 lb)   01/02/20 86.2 kg (190 lb)   12/16/19 86.2 kg (190 lb)                  Patient Active Problem List   Diagnosis     Swelling, mass, or lump in head and neck     Left knee injury     ACP (advance care planning)     Hypothyroidism, unspecified type     Female infertility     Hashimoto's disease     Hypothyroidism due to Hashimoto's thyroiditis     Past Surgical History:    Procedure Laterality Date     CARDIAC SURGERY      tachacardia in the past     SOFT TISSUE SURGERY      wisdom teeth extraction       Social History     Tobacco Use     Smoking status: Never Smoker     Smokeless tobacco: Never Used   Substance Use Topics     Alcohol use: No     Family History   Problem Relation Age of Onset     Cerebrovascular Disease Maternal Grandmother         CVA     Diabetes Maternal Grandmother      Breast Cancer Maternal Grandmother      Hypertension Father      Hyperlipidemia Mother      Depression Mother      Obesity Mother      Other Cancer Paternal Grandmother      Coronary Artery Disease No family hx of      Colon Cancer No family hx of      Prostate Cancer No family hx of      Anxiety Disorder No family hx of      Mental Illness No family hx of      Substance Abuse No family hx of      Anesthesia Reaction No family hx of      Asthma No family hx of      Osteoporosis No family hx of      Genetic Disorder No family hx of      Thyroid Disease No family hx of          Current Outpatient Medications   Medication Sig Dispense Refill     Cholecalciferol (VITAMIN D3) 2000 units CAPS Take 2,000 Units by mouth daily       levothyroxine (SYNTHROID/LEVOTHROID) 100 MCG tablet TAKE 1 TABLET BY MOUTH DAILY 90 tablet 0     metFORMIN (GLUCOPHAGE-XR) 750 MG 24 hr tablet Take 750 mg by mouth 2 times daily       Prenatal Vit-Fe Fumarate-FA (PRENATAL 19) CHEW Take 1 chew tab by mouth daily       Allergies   Allergen Reactions     Amoxicillin Rash       Mammogram not appropriate for this patient based on age.    Pertinent mammograms are reviewed under the imaging tab.  History of abnormal Pap smear:   Last 3 Pap and HPV Results:   PAP / HPV 6/17/2016 9/5/2013   PAP NIL NIL     PAP / HPV 6/17/2016 9/5/2013   PAP NIL NIL     Reviewed and updated as needed this visit by clinical staff         Reviewed and updated as needed this visit by Provider        Past Medical History:   Diagnosis Date     Acute upper  "respiratory infections of unspecified site 01/03/2001     Need for prophylactic vaccination and inoculation against other specified disease 03/05/2003      Past Surgical History:   Procedure Laterality Date     CARDIAC SURGERY      tachacardia in the past     SOFT TISSUE SURGERY      wisdom teeth extraction     OB History   No obstetric history on file.       ROS:  CONSTITUTIONAL: NEGATIVE for fever, chills, change in weight  INTEGUMENTARU/SKIN: NEGATIVE for worrisome rashes, moles or lesions  EYES: NEGATIVE for vision changes or irritation  ENT: NEGATIVE for ear, mouth and throat problems  RESP: NEGATIVE for significant cough or SOB  BREAST: right sided breast tenderness  CV: NEGATIVE for chest pain, palpitations or peripheral edema  GI: NEGATIVE for nausea, abdominal pain, heartburn, or change in bowel habits   female: abnormal menstrual cycle following OB/GYN and denies dysuria  MUSCULOSKELETAL: NEGATIVE for significant arthralgias or myalgia  NEURO: NEGATIVE for weakness, dizziness or paresthesias  ENDOCRINE: Hx thyroid disease  PSYCHIATRIC: NEGATIVE for changes in mood or affect    OBJECTIVE:   /62   Pulse 97   Temp 96.8  F (36  C) (Tympanic)   Resp 18   Ht 1.676 m (5' 6\")   Wt 86.2 kg (190 lb)   LMP 01/15/2020 (Approximate)   SpO2 98%   BMI 30.67 kg/m    EXAM:  GENERAL: healthy, alert and no distress  EYES: Eyes grossly normal to inspection, PERRL and conjunctivae and sclerae normal  HENT: ear canals and TM's normal, nose and mouth without ulcers or lesions  NECK: no adenopathy, no asymmetry, masses, or scars and thyroid normal to palpation  RESP: lungs clear to auscultation - no rales, rhonchi or wheezes  CV: regular rate and rhythm, normal S1 S2, no S3 or S4, no murmur, click or rub, no peripheral edema and peripheral pulses strong  ABDOMEN: soft, nontender, no hepatosplenomegaly, no masses and bowel sounds normal   (female): normal female external genitalia, normal urethral meatus , " "vaginal mucosa pink, moist, well rugated, vaginal discharge - scant, white and thick and normal cervix, adnexae, and uterus without masses.  MS: no gross musculoskeletal defects noted, no edema  SKIN: no suspicious lesions or rashes  NEURO: Normal strength and tone, mentation intact and speech normal  PSYCH: mentation appears normal, affect normal/bright  LYMPH: no cervical adenopathy    Diagnostic Test Results:  Labs reviewed in Epic  Results for orders placed or performed in visit on 20 (from the past 24 hour(s))   TSH with free T4 reflex   Result Value Ref Range    TSH 1.04 0.40 - 4.00 mU/L   Basic metabolic panel  (Ca, Cl, CO2, Creat, Gluc, K, Na, BUN)   Result Value Ref Range    Sodium 138 133 - 144 mmol/L    Potassium 3.8 3.4 - 5.3 mmol/L    Chloride 105 94 - 109 mmol/L    Carbon Dioxide 27 20 - 32 mmol/L    Anion Gap 6 3 - 14 mmol/L    Glucose 80 70 - 99 mg/dL    Urea Nitrogen 10 7 - 30 mg/dL    Creatinine 0.66 0.52 - 1.04 mg/dL    GFR Estimate >90 >60 mL/min/[1.73_m2]    GFR Estimate If Black >90 >60 mL/min/[1.73_m2]    Calcium 8.5 8.5 - 10.1 mg/dL     PAP pending     ASSESSMENT/PLAN:   1. Routine general medical examination at a health care facility  Continue with yearly physicals  - Basic metabolic panel  (Ca, Cl, CO2, Creat, Gluc, K, Na, BUN)    2. Hypothyroidism due to Hashimoto's thyroiditis  Continue current dose   - TSH with free T4 reflex    3. Encounter for Papanicolaou smear for cervical cancer screening  PAP completed today   Will notify of results once available  - A pap thin layer screen reflex to HPV if ASCUS - recommend age 25 - 29    COUNSELING:   Reviewed preventive health counseling, as reflected in patient instructions       Regular exercise       Healthy diet/nutrition       HIV screeninx in teen years, 1x in adult years, and at intervals if high risk    Estimated body mass index is 30.67 kg/m  as calculated from the following:    Height as of 19: 1.676 m (5' 6\").    Weight " as of 1/2/20: 86.2 kg (190 lb).         reports that she has never smoked. She has never used smokeless tobacco.      Counseling Resources:  ATP IV Guidelines  Pooled Cohorts Equation Calculator  Breast Cancer Risk Calculator  FRAX Risk Assessment  ICSI Preventive Guidelines  Dietary Guidelines for Americans, 2010  USDA's MyPlate  ASA Prophylaxis  Lung CA Screening    DAMON Soler CNP  [facility] [place]

## 2020-02-05 ENCOUNTER — OFFICE VISIT (OUTPATIENT)
Dept: FAMILY MEDICINE | Facility: OTHER | Age: 30
End: 2020-02-05
Attending: NURSE PRACTITIONER
Payer: COMMERCIAL

## 2020-02-05 VITALS
HEART RATE: 97 BPM | HEIGHT: 66 IN | SYSTOLIC BLOOD PRESSURE: 122 MMHG | BODY MASS INDEX: 30.53 KG/M2 | WEIGHT: 190 LBS | OXYGEN SATURATION: 98 % | TEMPERATURE: 96.8 F | DIASTOLIC BLOOD PRESSURE: 62 MMHG | RESPIRATION RATE: 18 BRPM

## 2020-02-05 DIAGNOSIS — Z12.4 ENCOUNTER FOR PAPANICOLAOU SMEAR FOR CERVICAL CANCER SCREENING: ICD-10-CM

## 2020-02-05 DIAGNOSIS — Z00.00 ROUTINE GENERAL MEDICAL EXAMINATION AT A HEALTH CARE FACILITY: Primary | ICD-10-CM

## 2020-02-05 DIAGNOSIS — E06.3 HYPOTHYROIDISM DUE TO HASHIMOTO'S THYROIDITIS: ICD-10-CM

## 2020-02-05 LAB
ANION GAP SERPL CALCULATED.3IONS-SCNC: 6 MMOL/L (ref 3–14)
BUN SERPL-MCNC: 10 MG/DL (ref 7–30)
CALCIUM SERPL-MCNC: 8.5 MG/DL (ref 8.5–10.1)
CHLORIDE SERPL-SCNC: 105 MMOL/L (ref 94–109)
CO2 SERPL-SCNC: 27 MMOL/L (ref 20–32)
CREAT SERPL-MCNC: 0.66 MG/DL (ref 0.52–1.04)
GFR SERPL CREATININE-BSD FRML MDRD: >90 ML/MIN/{1.73_M2}
GLUCOSE SERPL-MCNC: 80 MG/DL (ref 70–99)
POTASSIUM SERPL-SCNC: 3.8 MMOL/L (ref 3.4–5.3)
SODIUM SERPL-SCNC: 138 MMOL/L (ref 133–144)
TSH SERPL DL<=0.005 MIU/L-ACNC: 1.04 MU/L (ref 0.4–4)

## 2020-02-05 PROCEDURE — G0123 SCREEN CERV/VAG THIN LAYER: HCPCS | Performed by: NURSE PRACTITIONER

## 2020-02-05 PROCEDURE — 36415 COLL VENOUS BLD VENIPUNCTURE: CPT | Performed by: NURSE PRACTITIONER

## 2020-02-05 PROCEDURE — 84443 ASSAY THYROID STIM HORMONE: CPT | Performed by: NURSE PRACTITIONER

## 2020-02-05 PROCEDURE — 99395 PREV VISIT EST AGE 18-39: CPT | Performed by: NURSE PRACTITIONER

## 2020-02-05 PROCEDURE — 80048 BASIC METABOLIC PNL TOTAL CA: CPT | Performed by: NURSE PRACTITIONER

## 2020-02-05 ASSESSMENT — ANXIETY QUESTIONNAIRES
1. FEELING NERVOUS, ANXIOUS, OR ON EDGE: NOT AT ALL
7. FEELING AFRAID AS IF SOMETHING AWFUL MIGHT HAPPEN: NOT AT ALL
6. BECOMING EASILY ANNOYED OR IRRITABLE: NOT AT ALL
IF YOU CHECKED OFF ANY PROBLEMS ON THIS QUESTIONNAIRE, HOW DIFFICULT HAVE THESE PROBLEMS MADE IT FOR YOU TO DO YOUR WORK, TAKE CARE OF THINGS AT HOME, OR GET ALONG WITH OTHER PEOPLE: NOT DIFFICULT AT ALL
GAD7 TOTAL SCORE: 0
2. NOT BEING ABLE TO STOP OR CONTROL WORRYING: NOT AT ALL
5. BEING SO RESTLESS THAT IT IS HARD TO SIT STILL: NOT AT ALL
3. WORRYING TOO MUCH ABOUT DIFFERENT THINGS: NOT AT ALL

## 2020-02-05 ASSESSMENT — PAIN SCALES - GENERAL: PAINLEVEL: NO PAIN (0)

## 2020-02-05 ASSESSMENT — PATIENT HEALTH QUESTIONNAIRE - PHQ9
SUM OF ALL RESPONSES TO PHQ QUESTIONS 1-9: 1
5. POOR APPETITE OR OVEREATING: NOT AT ALL

## 2020-02-05 ASSESSMENT — MIFFLIN-ST. JEOR: SCORE: 1608.58

## 2020-02-05 NOTE — NURSING NOTE
"Chief Complaint   Patient presents with     Physical       Initial /62   Pulse 97   Temp 96.8  F (36  C) (Tympanic)   Resp 18   Ht 1.676 m (5' 6\")   Wt 86.2 kg (190 lb)   LMP 01/15/2020 (Approximate)   SpO2 98%   BMI 30.67 kg/m   Estimated body mass index is 30.67 kg/m  as calculated from the following:    Height as of this encounter: 1.676 m (5' 6\").    Weight as of this encounter: 86.2 kg (190 lb).  Medication Reconciliation: complete  Na Barkley LPN  "

## 2020-02-06 ASSESSMENT — ANXIETY QUESTIONNAIRES: GAD7 TOTAL SCORE: 0

## 2020-02-07 LAB
COPATH REPORT: NORMAL
PAP: NORMAL

## 2020-04-15 DIAGNOSIS — E03.9 HYPOTHYROIDISM, UNSPECIFIED TYPE: ICD-10-CM

## 2020-04-15 RX ORDER — LEVOTHYROXINE SODIUM 100 UG/1
TABLET ORAL
Qty: 90 TABLET | Refills: 2 | Status: SHIPPED | OUTPATIENT
Start: 2020-04-15 | End: 2020-09-09

## 2020-05-12 ENCOUNTER — TRANSFERRED RECORDS (OUTPATIENT)
Dept: HEALTH INFORMATION MANAGEMENT | Facility: CLINIC | Age: 30
End: 2020-05-12

## 2020-08-12 ENCOUNTER — HOSPITAL ENCOUNTER (EMERGENCY)
Facility: HOSPITAL | Age: 30
Discharge: HOME | End: 2020-08-12
Attending: NURSE PRACTITIONER | Admitting: NURSE PRACTITIONER
Payer: COMMERCIAL

## 2020-08-12 ENCOUNTER — APPOINTMENT (OUTPATIENT)
Dept: GENERAL RADIOLOGY | Facility: HOSPITAL | Age: 30
End: 2020-08-12
Attending: NURSE PRACTITIONER
Payer: COMMERCIAL

## 2020-08-12 VITALS
RESPIRATION RATE: 16 BRPM | DIASTOLIC BLOOD PRESSURE: 92 MMHG | OXYGEN SATURATION: 99 % | HEART RATE: 89 BPM | SYSTOLIC BLOOD PRESSURE: 126 MMHG | TEMPERATURE: 98.5 F

## 2020-08-12 DIAGNOSIS — S69.92XA WRIST INJURY, LEFT, INITIAL ENCOUNTER: Primary | ICD-10-CM

## 2020-08-12 PROCEDURE — G0463 HOSPITAL OUTPT CLINIC VISIT: HCPCS

## 2020-08-12 PROCEDURE — 73110 X-RAY EXAM OF WRIST: CPT | Mod: TC,LT

## 2020-08-12 PROCEDURE — 99212 OFFICE O/P EST SF 10 MIN: CPT | Mod: Z6 | Performed by: NURSE PRACTITIONER

## 2020-08-12 ASSESSMENT — ENCOUNTER SYMPTOMS
COLOR CHANGE: 1
JOINT SWELLING: 1
ARTHRALGIAS: 1
NUMBNESS: 0

## 2020-08-12 NOTE — ED AVS SNAPSHOT
[facility]  [address] [place] [address]  Phone:  [phone]                                    Gale Adams   MRN: [id number]    Department:  [facility]   Date of Visit:  8/12/2020           After Visit Summary Signature Page    I have received my discharge instructions, and my questions have been answered. I have discussed any challenges I see with this plan with the nurse or doctor.    ..........................................................................................................................................  Patient/Patient Representative Signature      ..........................................................................................................................................  Patient Representative Print Name and Relationship to Patient    ..................................................               ................................................  Date                                   Time    ..........................................................................................................................................  Reviewed by Signature/Title    ...................................................              ..............................................  Date                                               Time          22EPIC Rev 08/18

## 2020-08-13 NOTE — DISCHARGE INSTRUCTIONS
(S69.92XA) Wrist injury, left, initial encounter  (primary encounter diagnosis)  Comment: acute, symptomatic  Plan: XR negative for acute fracture  Recommend:  - Keep elevated to help with swelling and pain  - Ice over the next 24-48 hours. After that you can try heat to see if this is more helpful  - acetaminophen (tylenol) 1,000 mg three times daily  - ibuprofen (Advil) 800 mg with food every 8 hours   *Alternate above every 4 hours. For example: 8 am acetaminophen, 12 pm ibuprofen, 4 pm acetaminophen, 8 pm ibuprofen*  - Wear splint for support and comfort  - Note provided for work      RETURN TO THE ED WITH NEW OR WORSENING SYMPTOMS.    FOLLOW-UP WITH YOUR PRIMARY CARE PROVIDER IN 7-10 DAYS IF NO IMPROVEMENT.      Muna Paz, CNP

## 2020-08-13 NOTE — ED PROVIDER NOTES
History     Chief Complaint   Patient presents with     Wrist Pain     fell during playing softball onto left wrist. pt having pain     HPI     Gale Adams is a right hand dominant 30 year old female who presents ambulatory with concerns of left wrist injury that occurred tonight just prior to arrival. She was playing softball, went down to catch a ball, the ball rolled up and hit the dorsal surface of her left wrist. She had immediate onset of pain and swelling. Rates pain at 2-3/10 and increases to 5/10 with movement or pressure. She denies paresthesias, color changes, decreased ROM of left hand. 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 Uncertain if she will be able to use her wrist/hand.  She has tried ice without improvement in discomfort.     Allergies:  Allergies   Allergen Reactions     Amoxicillin Rash       Problem List:    Patient Active Problem List    Diagnosis Date Noted     Hashimoto's disease 10/26/2018     Priority: Medium     Hypothyroidism due to Hashimoto's thyroiditis 10/26/2018     Priority: Medium     Female infertility 12/06/2017     Priority: Medium       Possible male factor-  Decreased motility.  Urology referral  Dr. Quarles Repeat semen analysis when off alcohol x 3-6 months.    Repeat Nml by report.    Anovulation, clomid ->Letrozole       Hypothyroidism, unspecified type 03/15/2017     Priority: Medium     ACP (advance care planning) 06/17/2016     Priority: Medium     Advance Care Planning 6/17/2016: ACP Review of Chart / Resources Provided:  Reviewed chart for advance care plan.  Gale Adams has no plan or code status on file. Discussed available resources and provided with information. Confirmed code status reflects current choices pending further ACP discussions.  Confirmed/documented legally designated decision makers.  Added by Abi Llamas             Left knee injury 06/12/2015     Priority: Medium      Swelling, mass, or lump in head and neck 11/21/2013     Priority: Medium        Past Medical History:    Past Medical History:   Diagnosis Date     Acute upper respiratory infections of unspecified site 01/03/2001     Need for prophylactic vaccination and inoculation against other specified disease 03/05/2003       Past Surgical History:    Past Surgical History:   Procedure Laterality Date     CARDIAC SURGERY      tachacardia in the past     SOFT TISSUE SURGERY      wisdom teeth extraction       Family History:    Family History   Problem Relation Age of Onset     Cerebrovascular Disease Maternal Grandmother         CVA     Diabetes Maternal Grandmother      Breast Cancer Maternal Grandmother      Hypertension Father      Hyperlipidemia Mother      Depression Mother      Obesity Mother      Other Cancer Paternal Grandmother      Coronary Artery Disease No family hx of      Colon Cancer No family hx of      Prostate Cancer No family hx of      Anxiety Disorder No family hx of      Mental Illness No family hx of      Substance Abuse No family hx of      Anesthesia Reaction No family hx of      Asthma No family hx of      Osteoporosis No family hx of      Genetic Disorder No family hx of      Thyroid Disease No family hx of        Social History:  Marital Status:   [2]  Social History     Tobacco Use     Smoking status: Never Smoker     Smokeless tobacco: Never Used   Substance Use Topics     Alcohol use: No     Drug use: No        Medications:    Cholecalciferol (VITAMIN D3) 2000 units CAPS  levothyroxine (SYNTHROID/LEVOTHROID) 100 MCG tablet  metFORMIN (GLUCOPHAGE-XR) 750 MG 24 hr tablet  Prenatal Vit-Fe Fumarate-FA (PRENATAL 19) CHEW          Review of Systems   Musculoskeletal: Positive for arthralgias (left wrist) and joint swelling (left wrist).   Skin: Positive for color change (left wrist).   Neurological: Negative for numbness (of left wrist/hand).       Physical Exam   BP: 126/92  Pulse: 89  Temp:  98.5  F (36.9  C)  Resp: 16  SpO2: 99 %      Physical Exam  Constitutional:       General: She is not in acute distress.     Appearance: Normal appearance.   Cardiovascular:      Rate and Rhythm: Normal rate and regular rhythm.      Pulses:           Radial pulses are 2+ on the left side.   Pulmonary:      Effort: Pulmonary effort is normal.   Musculoskeletal:      Left wrist: She exhibits decreased range of motion, tenderness and swelling. She exhibits no deformity and no laceration.        Arms:       Left hand: She exhibits normal range of motion, no tenderness, normal capillary refill, no laceration and no swelling. Normal sensation noted. Decreased strength noted. She exhibits wrist extension trouble (due to pain). She exhibits no finger abduction and no thumb/finger opposition.   Skin:     General: Skin is warm and dry.      Capillary Refill: Capillary refill takes less than 2 seconds.      Findings: Ecchymosis present. No wound.   Neurological:      Mental Status: She is alert and oriented to person, place, and time.      Sensory: No sensory deficit (of left hand).   Psychiatric:         Mood and Affect: Mood normal.         Speech: Speech normal.         Behavior: Behavior normal. Behavior is cooperative.         ED Course        Procedures    No results found for this or any previous visit (from the past 24 hour(s)).    Medications - No data to display    Assessments & Plan (with Medical Decision Making)     I have reviewed the nursing notes.    I have reviewed the findings, diagnosis, plan and need for follow up with the patient.  (S69.92XA) Wrist injury, left, initial encounter  (primary encounter diagnosis)  Comment: acute, symptomatic  Plan: XR negative for acute fracture  Recommend:  - Keep elevated to help with swelling and pain  - Ice over the next 24-48 hours. After that you can try heat to see if this is more helpful  - acetaminophen (tylenol) 1,000 mg three times daily  - ibuprofen (Advil) 800 mg  with food every 8 hours   *Alternate above every 4 hours. For example: 8 am acetaminophen, 12 pm ibuprofen, 4 pm acetaminophen, 8 pm ibuprofen*  - Wear splint for support and comfort  - Note provided for work      RETURN TO THE ED WITH NEW OR WORSENING SYMPTOMS.    FOLLOW-UP WITH YOUR PRIMARY CARE PROVIDER IN 7-10 DAYS IF NO IMPROVEMENT.      Muna Paz CNP    Discharge Medication List as of 8/12/2020 10:12 PM          Final diagnoses:   Wrist injury, left, initial encounter       8/12/2020   [facility]     Muna Paz CNP  08/12/20 [id number]

## 2020-09-09 ENCOUNTER — VIRTUAL VISIT (OUTPATIENT)
Dept: FAMILY MEDICINE | Facility: OTHER | Age: 30
End: 2020-09-09
Attending: NURSE PRACTITIONER
Payer: COMMERCIAL

## 2020-09-09 DIAGNOSIS — M25.50 MULTIPLE JOINT PAIN: Primary | ICD-10-CM

## 2020-09-09 PROCEDURE — 99213 OFFICE O/P EST LOW 20 MIN: CPT | Mod: TEL | Performed by: NURSE PRACTITIONER

## 2020-09-09 RX ORDER — LEVOTHYROXINE SODIUM 112 UG/1
112 TABLET ORAL DAILY
COMMUNITY
End: 2023-04-04 | Stop reason: DRUGHIGH

## 2020-09-09 ASSESSMENT — PAIN SCALES - GENERAL: PAINLEVEL: NO PAIN (0)

## 2020-09-09 ASSESSMENT — ANXIETY QUESTIONNAIRES
GAD7 TOTAL SCORE: 0
7. FEELING AFRAID AS IF SOMETHING AWFUL MIGHT HAPPEN: NOT AT ALL
2. NOT BEING ABLE TO STOP OR CONTROL WORRYING: NOT AT ALL
1. FEELING NERVOUS, ANXIOUS, OR ON EDGE: NOT AT ALL
6. BECOMING EASILY ANNOYED OR IRRITABLE: NOT AT ALL
4. TROUBLE RELAXING: NOT AT ALL
5. BEING SO RESTLESS THAT IT IS HARD TO SIT STILL: NOT AT ALL
3. WORRYING TOO MUCH ABOUT DIFFERENT THINGS: NOT AT ALL

## 2020-09-09 ASSESSMENT — PATIENT HEALTH QUESTIONNAIRE - PHQ9: SUM OF ALL RESPONSES TO PHQ QUESTIONS 1-9: 0

## 2020-09-09 NOTE — PROGRESS NOTES
"Gale Adams is a 30 year old female who is being evaluated via a billable telephone visit.      The patient has been notified of following:     \"This telephone visit will be conducted via a call between you and your physician/provider. We have found that certain health care needs can be provided without the need for a physical exam.  This service lets us provide the care you need with a short phone conversation.  If a prescription is necessary we can send it directly to your pharmacy.  If lab work is needed we can place an order for that and you can then stop by our lab to have the test done at a later time.    Telephone visits are billed at different rates depending on your insurance coverage. During this emergency period, for some insurers they may be billed the same as an in-person visit.  Please reach out to your insurance provider with any questions.    If during the course of the call the physician/provider feels a telephone visit is not appropriate, you will not be charged for this service.\"    Patient has given verbal consent for Telephone visit?  Yes    What phone number would you like to be contacted at? [phone]    How would you like to obtain your AVS? [facility]    Subjective     Glae Adams is a 30 year old female who presents via phone visit today for the following health issues:    HPI     About 4 month has been feeling:  Concerns for possible lupus or other autoimmune disorder     Tired but can't fall asleep  Sleep but doesn't feel like she slept  Head fog  Bruising easily  Joint pain - back and hands (worst) - mainly thumbs   Cold really fast - hands will get cold   Rash cheeks are red  History of alopecia - followed with dermatology   Hypothyroidism Follow-up      Since last visit, patient describes the following symptoms: Weight stable, no hair loss, no skin changes, no constipation, no loose stools    * saw endocrine about a month - he was thinking she might have lupus.     *wants " "FMLA paperwork filled out for autoimmunity 2018 was diagnosed with autoimmune - then was refered to endocrinology- diagnosed with Hashimoto - labs, follow ups and if referred other placed- has never missed work due to Hashimoto's. Intermittent leave                 Review of Systems   CONSTITUTIONAL:chills/cold easily and fatigue  INTEGUMENTARY/SKIN: redness into face  RESP: NEGATIVE for significant cough or SOB  CV: NEGATIVE for chest pain, palpitations or peripheral edema  MUSCULOSKELETAL: occasional back pain, hand pain (has to stop typing at times), knee pain   NEURO: weakness into hands cold hands at times   ENDOCRINE: Hx thyroid disease  PSYCHIATRIC: NEGATIVE for changes in mood or affect       Objective   Vitals - Patient Reported  Pain Score: No Pain (0)        healthy, alert and no distress  PSYCH: Alert and oriented times 3; coherent speech, normal   rate and volume, able to articulate logical thoughts, able   to abstract reason, no tangential thoughts, no hallucinations   or delusions  Her affect is normal and pleasant  RESP: No cough, no audible wheezing, able to talk in full sentences  Remainder of exam unable to be completed due to telephone visits    Future labs ordere        Assessment/Plan:    Assessment & Plan     Multiple joint pain  Possible diagnosis of Lupus or other autoimmune disease.  She does have hypothyroidism with large thyroid  Difficulty getting pregnant    History of alopecia  Chronic fatigue with multiple joint pain   - Anti Nuclear [place] IgG by IFA with Reflex; Future  - Rheumatoid factor; Future  - CBC with platelets and differential; Future     BMI:   Estimated body mass index is 30.67 kg/m  as calculated from the following:    Height as of 2/5/20: 1.676 m (5' 6\").    Weight as of 2/5/20: 86.2 kg (190 lb).           See Patient Instructions    No follow-ups on file.    DAMON Soler [facility] [place]    Phone call duration:  7 minutes  Call started " 3:13  Call ended 3:20

## 2020-09-09 NOTE — NURSING NOTE
"Chief Complaint   Patient presents with     Thyroid Problem       Initial There were no vitals taken for this visit. Estimated body mass index is 30.67 kg/m  as calculated from the following:    Height as of 2/5/20: 1.676 m (5' 6\").    Weight as of 2/5/20: 86.2 kg (190 lb).  Medication Reconciliation: complete  Karin Villanueva LPN  "

## 2020-09-10 DIAGNOSIS — M25.50 MULTIPLE JOINT PAIN: ICD-10-CM

## 2020-09-10 LAB
BASOPHILS # BLD AUTO: 0 10E9/L (ref 0–0.2)
BASOPHILS NFR BLD AUTO: 0.7 %
DIFFERENTIAL METHOD BLD: NORMAL
EOSINOPHIL # BLD AUTO: 0.1 10E9/L (ref 0–0.7)
EOSINOPHIL NFR BLD AUTO: 1.7 %
ERYTHROCYTE [DISTWIDTH] IN BLOOD BY AUTOMATED COUNT: 12.6 % (ref 10–15)
HCT VFR BLD AUTO: 39 % (ref 35–47)
HGB BLD-MCNC: 13.2 G/DL (ref 11.7–15.7)
IMM GRANULOCYTES # BLD: 0 10E9/L (ref 0–0.4)
IMM GRANULOCYTES NFR BLD: 0.3 %
LYMPHOCYTES # BLD AUTO: 1.5 10E9/L (ref 0.8–5.3)
LYMPHOCYTES NFR BLD AUTO: 24.4 %
MCH RBC QN AUTO: 29.7 PG (ref 26.5–33)
MCHC RBC AUTO-ENTMCNC: 33.8 G/DL (ref 31.5–36.5)
MCV RBC AUTO: 88 FL (ref 78–100)
MONOCYTES # BLD AUTO: 0.5 10E9/L (ref 0–1.3)
MONOCYTES NFR BLD AUTO: 7.7 %
NEUTROPHILS # BLD AUTO: 3.9 10E9/L (ref 1.6–8.3)
NEUTROPHILS NFR BLD AUTO: 65.2 %
NRBC # BLD AUTO: 0 10*3/UL
NRBC BLD AUTO-RTO: 0 /100
PLATELET # BLD AUTO: 256 10E9/L (ref 150–450)
RBC # BLD AUTO: 4.44 10E12/L (ref 3.8–5.2)
WBC # BLD AUTO: 6 10E9/L (ref 4–11)

## 2020-09-10 PROCEDURE — 86038 ANTINUCLEAR ANTIBODIES: CPT | Performed by: NURSE PRACTITIONER

## 2020-09-10 PROCEDURE — 85025 COMPLETE CBC W/AUTO DIFF WBC: CPT | Performed by: NURSE PRACTITIONER

## 2020-09-10 PROCEDURE — 36415 COLL VENOUS BLD VENIPUNCTURE: CPT | Performed by: NURSE PRACTITIONER

## 2020-09-10 PROCEDURE — 86431 RHEUMATOID FACTOR QUANT: CPT | Performed by: NURSE PRACTITIONER

## 2020-09-10 PROCEDURE — 86039 ANTINUCLEAR ANTIBODIES (ANA): CPT | Performed by: NURSE PRACTITIONER

## 2020-09-10 ASSESSMENT — ANXIETY QUESTIONNAIRES: GAD7 TOTAL SCORE: 0

## 2020-09-11 DIAGNOSIS — R76.8 POSITIVE ANA (ANTINUCLEAR ANTIBODY): Primary | ICD-10-CM

## 2020-09-11 DIAGNOSIS — R76.8 POSITIVE ANA (ANTINUCLEAR ANTIBODY): ICD-10-CM

## 2020-09-11 LAB
ANA PAT SER IF-IMP: ABNORMAL
ANA SER QL IF: POSITIVE
ANA TITR SER IF: ABNORMAL {TITER}
RHEUMATOID FACT SER NEPH-ACNC: <7 IU/ML (ref 0–20)

## 2020-09-11 PROCEDURE — 86235 NUCLEAR ANTIGEN ANTIBODY: CPT | Performed by: NURSE PRACTITIONER

## 2020-09-11 PROCEDURE — 36415 COLL VENOUS BLD VENIPUNCTURE: CPT | Performed by: NURSE PRACTITIONER

## 2020-09-11 PROCEDURE — 86235 NUCLEAR ANTIGEN ANTIBODY: CPT | Mod: 59 | Performed by: NURSE PRACTITIONER

## 2020-09-11 PROCEDURE — 86225 DNA ANTIBODY NATIVE: CPT | Performed by: NURSE PRACTITIONER

## 2020-09-14 LAB
CENTROMERE IGG SER-ACNC: <0.2 AI (ref 0–0.9)
ENA RNP IGG SER IA-ACNC: <0.2 AI (ref 0–0.9)
ENA RNP IGG SER IA-ACNC: <0.2 AI (ref 0–0.9)
ENA SM IGG SER-ACNC: <0.2 AI (ref 0–0.9)
ENA SM IGG SER-ACNC: <0.2 AI (ref 0–0.9)
ENA SS-A IGG SER IA-ACNC: <0.2 AI (ref 0–0.9)
ENA SS-A IGG SER IA-ACNC: <0.2 AI (ref 0–0.9)
ENA SS-B IGG SER IA-ACNC: <0.2 AI (ref 0–0.9)
ENA SS-B IGG SER IA-ACNC: <0.2 AI (ref 0–0.9)

## 2020-09-15 ENCOUNTER — TELEPHONE (OUTPATIENT)
Dept: FAMILY MEDICINE | Facility: OTHER | Age: 30
End: 2020-09-15

## 2020-09-15 DIAGNOSIS — R76.8 POSITIVE ANA (ANTINUCLEAR ANTIBODY): Primary | ICD-10-CM

## 2020-09-15 DIAGNOSIS — R53.82 CHRONIC FATIGUE: Primary | ICD-10-CM

## 2020-09-15 DIAGNOSIS — R53.82 CHRONIC FATIGUE: ICD-10-CM

## 2020-09-15 LAB
DSDNA AB SER-ACNC: 2 IU/ML
FERRITIN SERPL-MCNC: 32 NG/ML (ref 12–150)
IRON SATN MFR SERPL: 17 % (ref 15–46)
IRON SERPL-MCNC: 66 UG/DL (ref 35–180)
TIBC SERPL-MCNC: 385 UG/DL (ref 240–430)

## 2020-09-15 PROCEDURE — 36415 COLL VENOUS BLD VENIPUNCTURE: CPT | Performed by: NURSE PRACTITIONER

## 2020-09-15 PROCEDURE — 83540 ASSAY OF IRON: CPT | Performed by: NURSE PRACTITIONER

## 2020-09-15 PROCEDURE — 82728 ASSAY OF FERRITIN: CPT | Performed by: NURSE PRACTITIONER

## 2020-09-15 PROCEDURE — 83550 IRON BINDING TEST: CPT | Performed by: NURSE PRACTITIONER

## 2020-09-15 NOTE — TELEPHONE ENCOUNTER
Notified patient of results per Danii- she said to put in a referral for her to see rheumatology at [facility] and she will see what they say, if they will let her see them. Karin Villanueva LPN

## 2020-09-15 NOTE — TELEPHONE ENCOUNTER
Gale would like referral to rheumatology due to worsening chronic fatigue, multiple joint pain and concerns for possible Lupus.  She has a positive KANDY, with no other supporting labs, but symptoms similar to Lupus or other autoimmune disorder.       Ref Range & Units  5d ago     KANDY interpretation  NEG^Negative  PositiveAbnormal       Comment:                                    Reference range:   <1:40  NEGATIVE   1:40 - 1:80  BORDERLINE POSITIVE   >1:80 POSITIVE      KANDY pattern 1   SPECKLED       KANDY titer 1   1:160        Ref Range & Units  5d ago     Rheumatoid Factor  <12 IU/mL  <7     Resulting Agency   [facility]          Specimen Collected: 09/10/20 10:16 AM  Last Resulted: 09/11/20  1:24 PM         Lab Results   Component Value Date    WBC 6.0 09/10/2020     Lab Results   Component Value Date    RBC 4.44 09/10/2020     Lab Results   Component Value Date    HGB 13.2 09/10/2020     Lab Results   Component Value Date    HCT 39.0 09/10/2020     No components found for: MCT  Lab Results   Component Value Date    MCV 88 09/10/2020     Lab Results   Component Value Date    MCH 29.7 09/10/2020     Lab Results   Component Value Date    MCHC 33.8 09/10/2020     Lab Results   Component Value Date    RDW 12.6 09/10/2020     Lab Results   Component Value Date     09/10/2020     Status:  Final result   Visible to patient:  No (not released) Dx:  Positive KANDY (antinuclear antibody)     Ref Range & Units  4d ago     DNA-ds  <10 IU/mL  2     Comment: Negative    Resulting Agency   [facility]          Specimen Collected: 09/11/20  4:34 PM  Last Resulted: 09/15/20 11:07 AM              Ref Range & Units  4d ago     SSB (La) (MAHAD) Antibody, IgG  0.0 - 0.9 AI  <0.2     Comment: Negative   Antibody index (AI) values reflect qualitative changes in antibody   concentration that cannot be directly associated with clinical condition or   disease state.         Ref Range & Units  4d ago     SSA (Ro) (MAHAD) Antibody, IgG  0.0 - 0.9 AI  <0.2     Comment: Negative   Antibody index (AI) values reflect qualitative changes in antibody   concentration that cannot be directly associated with clinical condition or   disease state.       Ref Range & Units  4d ago      Cuadra MAHAD Antibody IgG  0.0 - 0.9 AI  <0.2     Comment: Negative   Antibody index (AI) values reflect qualitative changes in antibody   concentration that cannot be directly associated with clinical condition or   disease state.        Ref Range & Units  4d ago     RNP Antibody IgG  0.0 - 0.9 AI  <0.2     Comment: Negative   Antibody index (AI) values reflect qualitative changes in antibody   concentration that cannot be directly associated with clinical condition or   disease state.        Ref Range & Units  4d ago     Centromere Antibody IgG  0.0 - 0.9 AI  <0.2     Comment: Negative   Antibody index (AI) values reflect qualitative changes in antibody   concentration that cannot be directly associated with clinical condition or   disease state.        Ref Range & Units  4d ago     RNP Antibody IgG  0.0 - 0.9 AI  <0.2     Comment: Negative   Antibody index (AI) values reflect qualitative changes in antibody   concentration that cannot be directly associated with clinical condition or   disease state.      Smith MAHAD Antibody IgG  0.0 - 0.9 AI  <0.2     Comment: Negative   Antibody index (AI) values reflect qualitative changes in antibody   concentration that cannot be directly associated with clinical condition or   disease state.      SSA (Ro) (MAHAD) Antibody, IgG  0.0 - 0.9 AI  <0.2     Comment: Negative   Antibody index (AI) values reflect qualitative changes in antibody   concentration that cannot be directly associated with clinical condition or   disease state.      SSB (La) (MAHAD) Antibody, IgG  0.0 - 0.9 AI  <0.2     Comment: Negative   Antibody index (AI) values reflect qualitative changes in antibody    concentration that cannot be directly associated with clinical condition or   disease state.          Results for orders placed or performed in visit on 09/15/20   Ferritin     Status: None   Result Value Ref Range    Ferritin 32 12 - 150 ng/mL   Iron and iron binding capacity     Status: None   Result Value Ref Range    Iron 66 35 - 180 ug/dL    Iron Binding Cap 385 240 - 430 ug/dL    Iron Saturation Index 17 15 - 46 %

## 2020-09-29 NOTE — MR AVS SNAPSHOT
After Visit Summary   3/15/2017    Gale Adams    MRN: [id number]           Patient Information     Date Of Birth          1990        Visit Information        Provider Department      3/15/2017 1:00 PM Danii Landa APRN CNP [facility]        Today's Diagnoses     Hypothyroidism, unspecified type    -  1       Follow-ups after your visit        Future tests that were ordered for you today     Open Future Orders        Priority Expected Expires Ordered    TSH Routine 4/15/2017 6/15/2017 3/15/2017            Who to contact     If you have questions or need follow up information about today's clinic visit or your schedule please contact [facility] directly at [phone].  Normal or non-critical lab and imaging results will be communicated to you by [employer], letter or phone within 4 business days after the clinic has received the results. If you do not hear from us within 7 days, please contact the clinic through [employer] or phone. If you have a critical or abnormal lab result, we will notify you by phone as soon as possible.  Submit refill requests through [employer] or call your pharmacy and they will forward the refill request to us. Please allow 3 business days for your refill to be completed.          Additional Information About Your Visit        MyChart Information     [employer] gives you secure access to your electronic health record. If you see a primary care provider, you can also send messages to your care team and make appointments. If you have questions, please call your primary care clinic.  If you do not have a primary care provider, please call [phone] and they will assist you.        Care EveryWhere ID     This is your Care EveryWhere ID. This could be used by other organizations to access your [facility] medical records  [id number]        Your Vitals Were     Pulse Temperature Respirations Height Pulse Oximetry BMI (Body Mass Index)    79  "98.7  F (37.1  C) (Tympanic) 20 5' 5.5\" (1.664 m) 98% 31.46 kg/m2       Blood Pressure from Last 3 Encounters:   03/15/17 112/74   02/17/17 108/82   07/12/16 120/68    Weight from Last 3 Encounters:   03/15/17 192 lb (87.1 kg)   02/17/17 190 lb (86.2 kg)   07/12/16 192 lb (87.1 kg)               Primary Care Provider Office Phone # Fax #    BRIA Nuñez [phone]       [facility] [place] [address]  [place] [address]        Thank you!     Thank you for choosing [facility]  for your care. Our goal is always to provide you with excellent care. Hearing back from our patients is one way we can continue to improve our services. Please take a few minutes to complete the written survey that you may receive in the mail after your visit with us. Thank you!             Your Updated Medication List - Protect others around you: Learn how to safely use, store and throw away your medicines at www.disposemymeds.org.          This list is accurate as of: 3/15/17  1:04 PM.  Always use your most recent med list.                   Brand Name Dispense Instructions for use    levothyroxine 50 MCG tablet    SYNTHROID/LEVOTHROID    90 tablet    Take 1 tablet (50 mcg) by mouth daily         " Eye Protection Verbiage: Before proceeding with the stage, a plastic scleral shield was inserted. The globe was anesthetized with a few drops of 1% lidocaine with 1:100,000 epinephrine. Then, an appropriate sized scleral shield was chosen and coated with lacrilube ointment. The shield was gently inserted and left in place for the duration of each stage. After the stage was completed, the shield was gently removed.

## 2020-11-06 ENCOUNTER — TRANSFERRED RECORDS (OUTPATIENT)
Dept: HEALTH INFORMATION MANAGEMENT | Facility: CLINIC | Age: 30
End: 2020-11-06

## 2020-12-14 ENCOUNTER — HEALTH MAINTENANCE LETTER (OUTPATIENT)
Age: 30
End: 2020-12-14

## 2021-02-05 ENCOUNTER — TRANSFERRED RECORDS (OUTPATIENT)
Dept: HEALTH INFORMATION MANAGEMENT | Facility: HOSPITAL | Age: 31
End: 2021-02-05

## 2021-02-05 LAB
T4 FREE SERPL-MCNC: 1.3 NG/DL (ref 0.7–1.7)
TSH SERPL-ACNC: 1.32 UIU/ML (ref 0.4–3.99)

## 2021-04-01 ENCOUNTER — NURSE TRIAGE (OUTPATIENT)
Dept: FAMILY MEDICINE | Facility: OTHER | Age: 31
End: 2021-04-01

## 2021-04-01 NOTE — TELEPHONE ENCOUNTER
Pt called reports she fell during vacation 3/20/21, hurt left ankle. Pt reports she has used a brace and it's not getting better. Pt reports swelling and bruising was present after fall. Pt reports some swelling is still present and requesting to be seen. Ice, elevate, rest, and ibuprofen being use by pt. Pt wanting to f/u with PCP.   Pt scheduled for tomorrow, approved time with PCP's nurse.     Next 5 appointments (look out 90 days)    Apr 02, 2021  8:40 AM  (Arrive by 8:25 AM)  SHORT with DAMON Aden [facility] [address]  [place] [address]  [phone]            Additional Information    Negative: Major bleeding (actively dripping or spurting) that can't be stopped    Negative: Bullet, stabbed by knife, or other serious penetrating wound    Negative: Looks like a dislocated joint (crooked or deformed)    Negative: Can't stand (bear weight) or walk    Negative: Sounds like a life-threatening emergency to the triager    Negative: Wound looks infected    Negative: Leg pain from overuse (e.g., sports, running, physical work)    Negative: Leg pain not from an injury    Negative: Hip injury is main concern    Negative: Knee injury is main concern    Negative: Foot or ankle injury is main concern    Negative: SEVERE pain (e.g. excruciating)    Negative: Skin is split open or gaping (or length > 1/2 inch or 12 mm)    Negative: Bleeding won't stop after 10 minutes of direct pressure (using correct technique)    Negative: Dirt in the wound and not removed with 15 minutes of scrubbing    Negative: Sounds like a serious injury to the triager    Negative: Looks infected (e.g., spreading redness, pus, red streak)    Negative: No prior tetanus shots (or is not fully vaccinated) and any wound (e.g., cut or scrape)    Negative: Patient wants to be seen    Injury interferes with work or school    Answer Assessment - Initial Assessment  "Questions  1. MECHANISM: \"How did the injury happen?\" (e.g., twisting injury, direct blow)       Fell on vacation  2. ONSET: \"When did the injury happen?\" (Minutes or hours ago)       3/20/21  3. LOCATION: \"Where is the injury located?\"       Left ankle  4. APPEARANCE of INJURY: \"What does the injury look like?\"  (e.g., deformity of leg)      Swelling no deformtity   5. SEVERITY: \"Can you put weight on that leg?\" \"Can you walk?\"       Walking ok no concerns and using brace.   6. SIZE: For cuts, bruises, or swelling, ask: \"How large is it?\" (e.g., inches or centimeters)       No bruising or cuts at this time  7. PAIN: \"Is there pain?\" If so, ask: \"How bad is the pain?\"  (Scale 1-10; or mild, moderate, severe)      Intermittent pain mild to moderate  8. TETANUS: For any breaks in the skin, ask: \"When was the last tetanus booster?\"      yes  9. OTHER SYMPTOMS: \"Do you have any other symptoms?\"       No   10. PREGNANCY: \"Is there any chance you are pregnant?\" \"When was your last menstrual period?\"        no    Protocols used: LEG INJURY-A-OH      "

## 2021-04-02 ENCOUNTER — OFFICE VISIT (OUTPATIENT)
Dept: FAMILY MEDICINE | Facility: OTHER | Age: 31
End: 2021-04-02
Attending: NURSE PRACTITIONER
Payer: COMMERCIAL

## 2021-04-02 ENCOUNTER — ANCILLARY PROCEDURE (OUTPATIENT)
Dept: GENERAL RADIOLOGY | Facility: OTHER | Age: 31
End: 2021-04-02
Attending: NURSE PRACTITIONER
Payer: COMMERCIAL

## 2021-04-02 VITALS
TEMPERATURE: 96.7 F | DIASTOLIC BLOOD PRESSURE: 60 MMHG | BODY MASS INDEX: 33.89 KG/M2 | WEIGHT: 210 LBS | OXYGEN SATURATION: 97 % | SYSTOLIC BLOOD PRESSURE: 110 MMHG | HEART RATE: 67 BPM

## 2021-04-02 DIAGNOSIS — Z32.00 POSSIBLE PREGNANCY: ICD-10-CM

## 2021-04-02 DIAGNOSIS — S93.402A SPRAIN OF LEFT ANKLE, UNSPECIFIED LIGAMENT, INITIAL ENCOUNTER: Primary | ICD-10-CM

## 2021-04-02 DIAGNOSIS — M25.572 PAIN IN JOINT INVOLVING ANKLE AND FOOT, LEFT: ICD-10-CM

## 2021-04-02 LAB — HCG UR QL: NEGATIVE

## 2021-04-02 PROCEDURE — 81025 URINE PREGNANCY TEST: CPT | Performed by: NURSE PRACTITIONER

## 2021-04-02 PROCEDURE — 99213 OFFICE O/P EST LOW 20 MIN: CPT | Performed by: NURSE PRACTITIONER

## 2021-04-02 PROCEDURE — 73610 X-RAY EXAM OF ANKLE: CPT | Mod: TC | Performed by: RADIOLOGY

## 2021-04-02 ASSESSMENT — PAIN SCALES - GENERAL: PAINLEVEL: MILD PAIN (3)

## 2021-04-02 NOTE — PROGRESS NOTES
Assessment & Plan     Sprain of left ankle, unspecified ligament, initial encounter  Discussed RICE -rest, ice, compression and elevation  Continue with symptomatic treatment and ankle brace  If no improvement or worsening symptoms consider MRI or therapy   Reviewed XR - no concerns for fracture     Possible pregnancy  Negative pregnancy - checked before imaging done   - HCG Qual, Urine ([id number])       See Patient Instructions    No follow-ups on file.    Danii Landa, APRN CNP  [facility] - [place] [place]   Gale is a 30 year old who presents for the following health issues     HPI     Musculoskeletal problem/pain  Onset/Duration: 3/20/21    Description  Location: left ankle - left  Joint Swelling: YES  Redness: no  Pain: YES  Warmth: no  Intensity:  mild  Progression of Symptoms:  Same.  Pain in different  Accompanying signs and symptoms:   Fevers: no  Numbness/tingling/weakness: no  History  Trauma to the area: rolled ankjle  Recent illness:  no  Previous similar problem: no  Previous evaluation:  no  Precipitating or alleviating factors:  Aggravating factors include: walking  Therapies tried and outcome: rest/inactivity, ice, support wrap and Ibuprofen        Review of Systems   CONSTITUTIONAL:NEGATIVE for fever, chills, change in weight  INTEGUMENTARY/SKIN: left ankle swelling   RESP: NEGATIVE for significant cough or SOB  CV: NEGATIVE for chest pain, palpitations or peripheral edema  MUSCULOSKELETAL: left ankle pain   NEURO: pain with weakness into the left ankle       Objective    /60   Pulse 67   Temp 96.7  F (35.9  C) (Tympanic)   Wt 95.3 kg (210 lb)   SpO2 97%   BMI 33.89 kg/m    Body mass index is 33.89 kg/m .  Physical Exam   GENERAL: healthy, alert and no distress  RESP: regular non-labored   CV: regular rates, peripheral pulses strong and no peripheral edema  MS: tenderness top and bottom of foot through left ankle    SKIN: no suspicious lesions or  rashes  PSYCH: mentation appears normal, affect normal/bright    Results for orders placed or performed in visit on 04/02/21   XR ANKLE LT G/E 3 VW (Clinic Performed)     Status: None    Narrative    PROCEDURE:  XR ANKLE LT G/E 3 VW    HISTORY: Pain in joint involving ankle and foot, left.    COMPARISON:  None.    TECHNIQUE:  3 views left ankle.    FINDINGS:  No fracture or dislocation is identified. The joint spaces  are preserved. No foreign body is seen.       Impression    IMPRESSION: No acute fracture.      AARON CRAWLEY MD   Results for orders placed or performed in visit on 04/02/21   HCG Qual, Urine ([id number])     Status: None   Result Value Ref Range    HCG Qual Urine Negative NEG^Negative

## 2021-04-02 NOTE — PATIENT INSTRUCTIONS
Patient Education     Treating Ankle Sprains  Treatment will depend on how bad your sprain is. For a severe sprain, healing may take 3 months or more.  Right after your injury: Use R.I.C.E.    BIG: Rest: At first, keep weight off the ankle as much as you can. You may be given crutches to help you walk without putting weight on the ankle.    BIG: Ice: Put an ice pack on the ankle for 20 minutes. Remove the pack and wait at least 30 minutes. Repeat for up to 3 days. This helps reduce swelling.    BIG: Compression: To reduce swelling and keep the joint stable, you may need to wrap the ankle with an elastic bandage. For more severe sprains, you may need an ankle brace, a boot, or a cast.    BIG: Elevation: To reduce swelling, keep your ankle raised above your heart when you sit or lie down.  Medicine  Your healthcare provider may suggest oral nonsteroidal anti-inflammatory medicine (NSAIDs), such as ibuprofen. This relieves the pain and helps reduce swelling. Be sure to take your medicine as directed.  Exercises    After about 2 to 3 weeks, you may be given exercises to strengthen the ligaments and muscles in the ankle. Doing these exercises will help prevent another ankle sprain. Exercises may include standing on your toes and then on your heels and doing ankle curls.    Sit on the edge of a sturdy table or lie on your back.    Pull your toes toward you. Then point them away from you. Repeat for 2 to 3 minutes.  [employer] last reviewed this educational content on 1/1/2018 2000-2020 [employer]. All rights reserved. This information is not intended as a substitute for professional medical care. Always follow your healthcare professional's instructions.

## 2021-04-02 NOTE — NURSING NOTE
"Chief Complaint   Patient presents with     Ankle Pain     left ankle       Initial /60   Pulse 67   Temp 96.7  F (35.9  C) (Tympanic)   Wt 95.3 kg (210 lb)   SpO2 97%   BMI 33.89 kg/m   Estimated body mass index is 33.89 kg/m  as calculated from the following:    Height as of 2/5/20: 1.676 m (5' 6\").    Weight as of this encounter: 95.3 kg (210 lb).  Medication Reconciliation: complete  Kimberly Velasquez LPN  "

## 2021-04-18 ENCOUNTER — HEALTH MAINTENANCE LETTER (OUTPATIENT)
Age: 31
End: 2021-04-18

## 2021-04-27 ENCOUNTER — RESULTS ONLY (OUTPATIENT)
Dept: LAB | Age: 31
End: 2021-04-27

## 2021-04-27 LAB
SPECIMEN SOURCE: NORMAL
STREP GROUP A PCR: NOT DETECTED

## 2021-04-28 LAB
LABORATORY COMMENT REPORT: NORMAL
SARS-COV-2 RNA RESP QL NAA+PROBE: NEGATIVE
SPECIMEN SOURCE: NORMAL

## 2021-04-29 ENCOUNTER — NURSE TRIAGE (OUTPATIENT)
Dept: FAMILY MEDICINE | Facility: OTHER | Age: 31
End: 2021-04-29

## 2021-04-29 PROBLEM — E55.9 VITAMIN D INSUFFICIENCY: Status: ACTIVE | Noted: 2021-02-09

## 2021-04-29 NOTE — TELEPHONE ENCOUNTER
Pt had negative COVID test on 4/27/21. Pt reports moderate wet cough, T 99.2 using tylenol and ibuprofen. Pt reports hx of pneumonia. Does report body aches and chills.   Pt scheduled.  Next 5 appointments (look out 90 days)    Apr 30, 2021  8:00 AM  (Arrive by 7:45 AM)  SHORT with Danii Landa, DAMON CASTRO, HC EXAM ROOM 2223  [facility] [place] ([facility] [place] ) [address]  [place] [address]  [phone]              Additional Information    Negative: Bluish (or gray) lips or face    Negative: Severe difficulty breathing (e.g., struggling for each breath, speaks in single words)    Negative: Rapid onset of cough and has hives    Negative: Coughing started suddenly after medicine, an allergic food or bee sting    Negative: Difficulty breathing after exposure to flames, smoke, or fumes    Negative: Sounds like a life-threatening emergency to the triager    Negative: Previous asthma attacks and this feels like asthma attack    Negative: Chest pain present when not coughing    Negative: Difficulty breathing    Negative: Passed out (i.e., fainted, collapsed and was not responding)    Negative: Patient sounds very sick or weak to the triager    Negative: Coughed up > 1 tablespoon (15 ml) blood (Exception: blood-tinged sputum)    Negative: Fever > 103 F (39.4 C)    Negative: Fever > 101 F (38.3 C) and over 60 years of age    Negative: Fever > 100.0 F (37.8 C) and has diabetes mellitus or a weak immune system (e.g., HIV positive, cancer chemotherapy, organ transplant, splenectomy, chronic steroids)    Negative: Fever > 100.0 F (37.8 C) and bedridden (e.g., nursing home patient, stroke, chronic illness, recovering from surgery)    Negative: Increasing ankle swelling    Negative: Wheezing is present    Negative: SEVERE coughing spells (e.g., whooping sound after coughing, vomiting after coughing)    Negative: Coughing up bayron-colored (reddish-brown) or blood-tinged  "sputum    Negative: Fever present > 3 days (72 hours)    Negative: Fever returns after gone for over 24 hours and symptoms worse or not improved    Negative: Using nasal washes and pain medicine > 24 hours and sinus pain persists    Negative: Known COPD or other severe lung disease (i.e., bronchiectasis, cystic fibrosis, lung surgery) and worsening symptoms (i.e., increased sputum purulence or amount, increased breathing difficulty)    Negative: Continuous (nonstop) coughing interferes with work or school and no improvement using cough treatment per Care Advice    Patient wants to be seen    Answer Assessment - Initial Assessment Questions  1. ONSET: \"When did the cough begin?\"       4/27/21  2. SEVERITY: \"How bad is the cough today?\"       moderate  3. RESPIRATORY DISTRESS: \"Describe your breathing.\"       Mild SOB and wheezing   4. FEVER: \"Do you have a fever?\" If so, ask: \"What is your temperature, how was it measured, and when did it start?\"      99.2  5. HEMOPTYSIS: \"Are you coughing up any blood?\" If so ask: \"How much?\" (flecks, streaks, tablespoons, etc.)      no  6. TREATMENT: \"What have you done so far to treat the cough?\" (e.g., meds, fluids, humidifier)      Yes to all  7. CARDIAC HISTORY: \"Do you have any history of heart disease?\" (e.g., heart attack, congestive heart failure)       no  8. LUNG HISTORY: \"Do you have any history of lung disease?\"  (e.g., pulmonary embolus, asthma, emphysema)      no  9. PE RISK FACTORS: \"Do you have a history of blood clots?\" (or: recent major surgery, recent prolonged travel, bedridden)      no  10. OTHER SYMPTOMS: \"Do you have any other symptoms? (e.g., runny nose, wheezing, chest pain)        Mild wheezing  11. PREGNANCY: \"Is there any chance you are pregnant?\" \"When was your last menstrual period?\"        no  12. TRAVEL: \"Have you traveled out of the country in the last month?\" (e.g., travel history, exposures)        no    Protocols used: COUGH-A-OH      "

## 2021-04-29 NOTE — PROGRESS NOTES
Assessment & Plan     Cough  DDX: viral uri, sinusitis, bronchitis  She was vaccinated for COVID and had a recent negative COVID this week  Ruled out pneumonia on the chest XR  Continue symptomatic treatment, can try afrin nasal spray for sinus congestion no more then 3 days. Her symptoms are starting to improve today, so continue to monitor.  Did discuss with her if her symptoms worsen or do not improve throughout the weekend she can start antibiotic.  Provided a prescription for her to bring in if no improvement   - XR CHEST 2 VW (Clinic Performed); Future    Acute sinusitis, recurrence not specified, unspecified location  As above  - doxycycline hyclate (VIBRAMYCIN) 100 MG capsule; Take 1 capsule (100 mg) by mouth 2 times daily for 7 days       See Patient Instructions    No follow-ups on file.    DAMON Soler [facility] [place]    Melissa Beasley is a 30 year old who presents for the following health issues     HPI     Acute Illness  Acute illness concerns: URI  Onset/Duration: Monday night   Symptoms:  Fever: YES- 101-102 (without anything)  Chills/Sweats: YES  Headache (location?): YES- frontal area  Sinus Pressure: YES  Conjunctivitis:  no  Ear Pain: YES: right  Rhinorrhea: YES  Congestion: YES  Sore Throat: YES- negative for strep  Cough: YES-productive of green sputum  Wheeze: YES- after cough   Decreased Appetite: YES  Nausea: no  Vomiting: no  Diarrhea: YES- yesterday   Dysuria/Freq.: no  Dysuria or Hematuria: no  Fatigue/Achiness: YES- both  Sick/Strep Exposure: no  Therapies tried and outcome: ibuprofen and tylenol - helps fever and the ear pain.    Patient needs a work excuse- has been out of work since Tuesday.         Review of Systems   CONSTITUTIONAL:chills, fatigue and fever   INTEGUMENTARY/SKIN: NEGATIVE for worrisome rashes, moles or lesions  ENT/MOUTH: right ear pain and sore throat  RESP:cough and SOB  CV: NEGATIVE for chest pain, palpitations or  peripheral edema  GI: diarrhea  : denies dysuria   NEURO: fatigue       Objective    /80   Pulse 75   Temp 97.1  F (36.2  C) (Tympanic)   Wt 95.3 kg (210 lb)   SpO2 98%   BMI 33.89 kg/m    Body mass index is 33.89 kg/m .  Physical Exam   GENERAL: alert and no distress  HENT: normal cephalic/atraumatic, ear canals and TM's normal, nose and mouth without ulcers or lesions, oropharynx clear, oral mucous membranes moist and sinuses: maxillary, frontal tenderness on both sides  NECK: no adenopathy, no asymmetry, masses, or scars and thyroid normal to palpation  RESP: diminished bases bilaterally   CV: regular rate and rhythm, normal S1 S2, no S3 or S4, no murmur, click or rub, no peripheral edema and peripheral pulses strong  ABDOMEN: soft, nontender, no hepatosplenomegaly, no masses and bowel sounds normal  SKIN: no suspicious lesions or rashes  NEURO: Normal strength and tone, mentation intact and speech normal  PSYCH: mentation appears normal, affect normal/bright  LYMPH: normal ant/post cervical, supraclavicular nodes    Results for orders placed or performed in visit on 04/30/21   XR CHEST 2 VW (Clinic Performed)     Status: None    Narrative    PROCEDURE: XR CHEST 2 VW 4/30/2021 8:43 AM    HISTORY: Cough    COMPARISONS: 6/26/2018.    TECHNIQUE: 2 views.    FINDINGS: Heart and pulmonary vasculature are normal. Lungs are clear  and no pleural effusion is seen.         Impression    IMPRESSION: No acute disease.    FRANCI PERLA MD

## 2021-04-30 ENCOUNTER — OFFICE VISIT (OUTPATIENT)
Dept: FAMILY MEDICINE | Facility: OTHER | Age: 31
End: 2021-04-30
Attending: NURSE PRACTITIONER
Payer: COMMERCIAL

## 2021-04-30 ENCOUNTER — ANCILLARY PROCEDURE (OUTPATIENT)
Dept: GENERAL RADIOLOGY | Facility: OTHER | Age: 31
End: 2021-04-30
Attending: NURSE PRACTITIONER
Payer: COMMERCIAL

## 2021-04-30 VITALS
TEMPERATURE: 97.1 F | BODY MASS INDEX: 33.89 KG/M2 | OXYGEN SATURATION: 98 % | SYSTOLIC BLOOD PRESSURE: 110 MMHG | WEIGHT: 210 LBS | DIASTOLIC BLOOD PRESSURE: 80 MMHG | HEART RATE: 75 BPM

## 2021-04-30 DIAGNOSIS — R05.9 COUGH: ICD-10-CM

## 2021-04-30 DIAGNOSIS — R05.9 COUGH: Primary | ICD-10-CM

## 2021-04-30 DIAGNOSIS — J01.90 ACUTE SINUSITIS, RECURRENCE NOT SPECIFIED, UNSPECIFIED LOCATION: ICD-10-CM

## 2021-04-30 PROCEDURE — 71046 X-RAY EXAM CHEST 2 VIEWS: CPT | Mod: TC | Performed by: RADIOLOGY

## 2021-04-30 PROCEDURE — 99213 OFFICE O/P EST LOW 20 MIN: CPT | Performed by: NURSE PRACTITIONER

## 2021-04-30 RX ORDER — DOXYCYCLINE 100 MG/1
100 CAPSULE ORAL 2 TIMES DAILY
Qty: 14 CAPSULE | Refills: 0 | Status: SHIPPED | OUTPATIENT
Start: 2021-04-30 | End: 2021-05-07

## 2021-04-30 ASSESSMENT — ANXIETY QUESTIONNAIRES
5. BEING SO RESTLESS THAT IT IS HARD TO SIT STILL: NOT AT ALL
3. WORRYING TOO MUCH ABOUT DIFFERENT THINGS: NOT AT ALL
GAD7 TOTAL SCORE: 0
7. FEELING AFRAID AS IF SOMETHING AWFUL MIGHT HAPPEN: NOT AT ALL
4. TROUBLE RELAXING: NOT AT ALL
1. FEELING NERVOUS, ANXIOUS, OR ON EDGE: NOT AT ALL
2. NOT BEING ABLE TO STOP OR CONTROL WORRYING: NOT AT ALL
6. BECOMING EASILY ANNOYED OR IRRITABLE: NOT AT ALL

## 2021-04-30 ASSESSMENT — PAIN SCALES - GENERAL: PAINLEVEL: NO PAIN (0)

## 2021-04-30 ASSESSMENT — PATIENT HEALTH QUESTIONNAIRE - PHQ9: SUM OF ALL RESPONSES TO PHQ QUESTIONS 1-9: 0

## 2021-04-30 NOTE — NURSING NOTE
"Chief Complaint   Patient presents with     URI       Initial /80   Pulse 75   Temp 97.1  F (36.2  C) (Tympanic)   Wt 95.3 kg (210 lb)   SpO2 98%   BMI 33.89 kg/m   Estimated body mass index is 33.89 kg/m  as calculated from the following:    Height as of 2/5/20: 1.676 m (5' 6\").    Weight as of this encounter: 95.3 kg (210 lb).  Medication Reconciliation: complete  Karin Villanueva LPN  "

## 2021-04-30 NOTE — PATIENT INSTRUCTIONS

## 2021-05-01 ASSESSMENT — ANXIETY QUESTIONNAIRES: GAD7 TOTAL SCORE: 0

## 2021-06-11 ENCOUNTER — TELEPHONE (OUTPATIENT)
Dept: FAMILY MEDICINE | Facility: OTHER | Age: 31
End: 2021-06-11

## 2021-06-11 ENCOUNTER — OFFICE VISIT (OUTPATIENT)
Dept: CHIROPRACTIC MEDICINE | Facility: OTHER | Age: 31
End: 2021-06-11
Attending: CHIROPRACTOR
Payer: COMMERCIAL

## 2021-06-11 DIAGNOSIS — M54.2 CERVICALGIA: ICD-10-CM

## 2021-06-11 DIAGNOSIS — M99.02 SEGMENTAL AND SOMATIC DYSFUNCTION OF THORACIC REGION: ICD-10-CM

## 2021-06-11 DIAGNOSIS — M99.01 SEGMENTAL AND SOMATIC DYSFUNCTION OF CERVICAL REGION: Primary | ICD-10-CM

## 2021-06-11 DIAGNOSIS — M99.03 SEGMENTAL AND SOMATIC DYSFUNCTION OF LUMBAR REGION: ICD-10-CM

## 2021-06-11 PROCEDURE — 98941 CHIROPRACT MANJ 3-4 REGIONS: CPT | Mod: AT | Performed by: CHIROPRACTOR

## 2021-06-11 PROCEDURE — 99212 OFFICE O/P EST SF 10 MIN: CPT | Mod: 25 | Performed by: CHIROPRACTOR

## 2021-06-11 NOTE — TELEPHONE ENCOUNTER
Yes please have her go to the  if she is willing.  I do not have any openings today.      Danii JOSE FNP-BC  Family Nurse Practitioner

## 2021-06-11 NOTE — TELEPHONE ENCOUNTER
7:32 AM    Reason for Call: OVERBOOK    Patient is having the following symptoms: shoulder pain since last Tues 06/08/21 and is getting worse, could not sleep last night    The patient is requesting an appointment for (see above).    Was an appointment offered for this call? No  If yes : Appointment type              Date    Preferred method for responding to this message: Telephone Call     What is your phone number [phone]    If we cannot reach you directly, may we leave a detailed response at the number you provided? Yes    Can this message wait until your PCP/provider returns, if unavailable today? Not applicable, provider is in    [facility]

## 2021-06-14 ENCOUNTER — OFFICE VISIT (OUTPATIENT)
Dept: CHIROPRACTIC MEDICINE | Facility: OTHER | Age: 31
End: 2021-06-14
Attending: CHIROPRACTOR
Payer: COMMERCIAL

## 2021-06-14 DIAGNOSIS — M54.2 CERVICALGIA: ICD-10-CM

## 2021-06-14 DIAGNOSIS — M99.02 SEGMENTAL AND SOMATIC DYSFUNCTION OF THORACIC REGION: ICD-10-CM

## 2021-06-14 DIAGNOSIS — M99.01 SEGMENTAL AND SOMATIC DYSFUNCTION OF CERVICAL REGION: Primary | ICD-10-CM

## 2021-06-14 PROCEDURE — 98941 CHIROPRACT MANJ 3-4 REGIONS: CPT | Mod: AT | Performed by: CHIROPRACTOR

## 2021-06-14 NOTE — PROGRESS NOTES
Subjective Finding:    Chief compalint: Patient presents with:  Neck Pain  Back Pain  , Pain Scale: 2/10, Intensity: sharp, Duration: 2 weeks, Radiating: no.    Date of injury:     Activities that the pain restricts:   Home/household/hobbies/social activities: yes.  Work duties: yes.  Sleep: no.  Makes symptoms better: rest.  Makes symptoms worse: activity, lumbar extension and lumbar flexion.  Have you seen anyone else for the symptoms? No.  Work related: no.  Automobile related injury: no.    Objective and Assessment:    Posture Analysis:   High shoulder: .  Head tilt: .  High iliac crest: .  Head carriage: forward.  Thoracic Kyphosis: neutral.  Lumbar Lordosis: forward.    Lumbar Range of Motion: extension decreased.  Cervical Range of Motion: extension decreased.  Thoracic Range of Motion: flexion decreased and extension decreased.  Extremity Range of Motion: .    Palpation:   Quad lumb: bilateral, referred pain: no  Lev scapulae: sharp pain, referred pain: no    Segmental dysfunction pre-treatment and treatment area: C3, C6, T7, T9 and L5.    Assessment post-treatment:  Cervical: ROM increased.  Thoracic: ROM increased.  Lumbar: ROM increased.    Comments: .      Complicating Factors: .    Procedure(s):  CMT:  80227 Chiropractic manipulative treatment 3-4 regions performed   Cervical: Diversified, See above for level, Supine, Thoracic: Diversified, See above for level, Prone and Lumbar: Diversified, See above for level, Side posture    Modalities:  None performed this visit    Therapeutic procedures:  None    Plan:  Treatment plan: PRN.  Instructed patient: ice 20 minutes every other hour as needed.  Short term goals: increase ROM.  Long term goals: increase strength.  Prognosis: very good.

## 2021-06-16 NOTE — PROGRESS NOTES
Subjective Finding:    Chief compalint: Patient presents with:  Neck Pain  , Pain Scale: 2/10, Intensity: sharp, Duration: 2 weeks, Radiating: no.    Date of injury:     Activities that the pain restricts:   Home/household/hobbies/social activities: yes.  Work duties: yes.  Sleep: no.  Makes symptoms better: rest.  Makes symptoms worse: activity, lumbar extension and lumbar flexion.  Have you seen anyone else for the symptoms? No.  Work related: no.  Automobile related injury: no.    Objective and Assessment:    Posture Analysis:   High shoulder: .  Head tilt: .  High iliac crest: .  Head carriage: forward.  Thoracic Kyphosis: neutral.  Lumbar Lordosis: forward.    Lumbar Range of Motion: extension decreased.  Cervical Range of Motion: extension decreased.  Thoracic Range of Motion: flexion decreased and extension decreased.  Extremity Range of Motion: .    Palpation:   Quad lumb: bilateral, referred pain: no  Lev scapulae: sharp pain, referred pain: no    Segmental dysfunction pre-treatment and treatment area: C3, C6, T7, T9 and L5.    Assessment post-treatment:  Cervical: ROM increased.  Thoracic: ROM increased.  Lumbar: ROM increased.    Comments: .      Complicating Factors: .    Procedure(s):  CMT:  72134 Chiropractic manipulative treatment 3-4 regions performed   Cervical: Diversified, See above for level, Supine, Thoracic: Diversified, See above for level, Prone and Lumbar: Diversified, See above for level, Side posture    Modalities:  None performed this visit    Therapeutic procedures:  None    Plan:  Treatment plan: PRN.  Instructed patient: ice 20 minutes every other hour as needed.  Short term goals: increase ROM.  Long term goals: increase strength.  Prognosis: very good.

## 2021-10-03 ENCOUNTER — HEALTH MAINTENANCE LETTER (OUTPATIENT)
Age: 31
End: 2021-10-03

## 2021-11-16 NOTE — LETTER
"December 20, 2019      Gale Silverman  [address] [facility] [address]        Dear ,    We are writing to inform you of your test results.    Your test results fall within the expected range normal results from biopsy. We did get clear margines.     Resulted Orders   Surgical pathology exam   Result Value Ref Range    Copath Report       Patient Name: GALE SILVERMAN  MR#: [id number]  Specimen #: [id number]  Collected: 12/16/2019  Received: 12/17/2019  Reported: 12/18/2019 16:02  Ordering Phy(s): GENIA VELASCO    For improved result formatting, select 'View Enhanced Report Format' under   Linked Documents section.    SPECIMEN(S):  Skin, left upper outer thigh    FINAL DIAGNOSIS:  Skin, left upper outer thigh, biopsy  - Dermal nevus, deep margin positive (see comment)  - Findings suggestive of previous biopsy or resection site    COMMENT:  This patient had a previous skin biopsy of her left leg on September 12, 2019, with a diagnosis of \"compound  melanocytic nevus with architectural disorder, mild cytologic atypia, and   microscopic extension to peripheral  biopsy margin\".  There is also a comment advising complete excision.  If   the current biopsy is the same  lesion, the margins are positive, and this lesion should be completely   excised.    I have personally reviewed all specimens and/or slides, includi ng the   listed special stains, and used them  with my medical judgement to determine or confirm the final diagnosis.    Electronically signed out by:    Massimo Jules M.D.    CLINICAL HISTORY:  Abnormal growth; atypical mole.    GROSS:  There is a piece of tan skin which is 5 x 3 x 2 mm with a light and dark   brown surface.  After the margin is  inked it is sectioned. (2 TE in 1 block). (Dictated by: Massimo Jules MD   12/17/2019 03:31 PM)    MICROSCOPIC:  There is a dermal nevus which is present at the deep margin.  The   overlying skin is mildly inflamed with  pigmentary incontinence " Referred by: Petey Talley MD; Medical Diagnosis (from order):    Diagnosis Information      Diagnosis    724.5, 338.29 (ICD-9-CM) - M54.9, G89.29 (ICD-10-CM) - Chronic bilateral back pain, unspecified back location                Daily Treatment Note    Visit:  2     SUBJECTIVE                                                                                                               Patient states back is going okay but left leg is what is bothering patient. Patient states left leg seems as if it is getting a little worse. Patient states he just got the HEP.     OBJECTIVE                                                                                                                       Comments / Details: Even iliac crest and greater trochanter in standing, even ASIS in supine     Joint Play:   Hip:     - Inferior Glide (in 90/90): Left: hypomobile     - Lateral Capsule: Left: hypomobile       TREATMENT                                                                                                                  Therapeutic Exercise:  Alternating prone knee extension x10, bilateral    Manual Therapy:  STM to thoracic and thoracolumbar paraspinals   Grade 2 central PA to mid thoracic vertebrae  Grade 2 unilateral PA to right mid to lower thoracic vertebrae  Grade 2-3 lateral distraction and inferior glides to left hip joint with belt   Leg pull manual distraction   Self lumbar traction   IASTM to left TFL. Patient educated on potential redness, bruising, and soreness following IASTM. Patient issued. Patient educated to not use cryotherapy due to numbness in left thigh.    Skilled input: verbal instruction/cues, tactile instruction/cues and facilitation    Writer verbally educated and received verbal consent for hand placement, positioning of patient, and techniques to be performed today from patient for hand placement and palpation for techniques, clothing adjustments for techniques and therapist position for  and some dermal fibrosis suggesting previous   biopsy or resection site.    CPT Codes  A: 64256-QW9    COLLECTION SITE:  Client: [facility]  Location: [facility] (B)    The technical component of this testing was completed at the [facility], with the  professional component performed at the [facility], [address] [place] [address]  ([phone])           If you have any questions or concerns, please call the clinic at the number listed above.       Sincerely,        DAMON Soler CNP                 techniques as described above and how they are pertinent to the patient's plan of care.    Home Exercise Program/Education Materials: Access Code: [email or web address]  URL: [email or web address]  Date: 11/11/2021  Prepared by: Belinda Roque    Exercises  Prone Femoral Nerve Mobilization - 1 x daily - 7 x weekly - 3 sets - 10 reps  Lying Prone with 1 Pillow - 3 x daily - 7 x weekly - 5 minutes hold  Prone Press Up on Elbows - 1 x daily - 7 x weekly - 2 sets - 10 reps  Supine Lower Trunk Rotation - 1 x daily - 7 x weekly - 2 sets - 10 reps  Supine ITB Stretch - 2 x daily - 7 x weekly - 30 seconds hold       ASSESSMENT                                                                                                             Patient had improve symptoms following session. Patient would continue to benefit from skilled physical therapy to progress patient towards goals.        PLAN                                                                                                                           Suggestions for next session as indicated: Progress per plan of care, thoracic mobility, continue to manual techniques         Therapy procedure time and total treatment time can be found documented on the Time Entry flowsheet

## 2021-11-23 ENCOUNTER — IMMUNIZATION (OUTPATIENT)
Dept: FAMILY MEDICINE | Facility: OTHER | Age: 31
End: 2021-11-23
Attending: NURSE PRACTITIONER

## 2021-11-23 PROCEDURE — 0004A PR COVID VAC PFIZER DIL RECON 30 MCG/0.3 ML IM: CPT

## 2021-11-23 PROCEDURE — 91300 PR COVID VAC PFIZER DIL RECON 30 MCG/0.3 ML IM: CPT

## 2022-02-17 PROBLEM — N97.9 FEMALE INFERTILITY: Status: ACTIVE | Noted: 2017-12-06

## 2022-05-14 ENCOUNTER — HEALTH MAINTENANCE LETTER (OUTPATIENT)
Age: 32
End: 2022-05-14

## 2022-06-19 NOTE — ED TRIAGE NOTES
Pt presents today with c/o left wrist injury. Pt states she was playing softball and it hit her wrist. Happened today.   
Discharged

## 2022-07-15 ENCOUNTER — MYC MEDICAL ADVICE (OUTPATIENT)
Dept: FAMILY MEDICINE | Facility: OTHER | Age: 32
End: 2022-07-15

## 2022-07-25 ENCOUNTER — MYC MEDICAL ADVICE (OUTPATIENT)
Dept: FAMILY MEDICINE | Facility: OTHER | Age: 32
End: 2022-07-25

## 2022-09-04 ENCOUNTER — HEALTH MAINTENANCE LETTER (OUTPATIENT)
Age: 32
End: 2022-09-04

## 2022-09-26 ENCOUNTER — HOSPITAL ENCOUNTER (OUTPATIENT)
Dept: GENERAL RADIOLOGY | Facility: HOSPITAL | Age: 32
Discharge: HOME | End: 2022-09-26
Attending: FAMILY MEDICINE

## 2022-09-26 DIAGNOSIS — R76.11 MANTOUX: POSITIVE: ICD-10-CM

## 2022-09-26 PROCEDURE — 999N000029 XR CHEST 1 VIEW, JOB CARE

## 2022-09-27 ENCOUNTER — OFFICE VISIT (OUTPATIENT)
Dept: FAMILY MEDICINE | Facility: OTHER | Age: 32
End: 2022-09-27
Attending: FAMILY MEDICINE
Payer: COMMERCIAL

## 2022-09-27 ENCOUNTER — LAB (OUTPATIENT)
Dept: LAB | Facility: OTHER | Age: 32
End: 2022-09-27
Attending: FAMILY MEDICINE
Payer: COMMERCIAL

## 2022-09-27 VITALS
SYSTOLIC BLOOD PRESSURE: 105 MMHG | OXYGEN SATURATION: 97 % | RESPIRATION RATE: 15 BRPM | TEMPERATURE: 97 F | HEIGHT: 66 IN | WEIGHT: 190 LBS | BODY MASS INDEX: 30.53 KG/M2 | HEART RATE: 68 BPM | DIASTOLIC BLOOD PRESSURE: 70 MMHG

## 2022-09-27 DIAGNOSIS — R93.89 ABNORMAL CXR (CHEST X-RAY): ICD-10-CM

## 2022-09-27 DIAGNOSIS — R76.11 MANTOUX: POSITIVE: ICD-10-CM

## 2022-09-27 DIAGNOSIS — R76.11 MANTOUX: POSITIVE: Primary | ICD-10-CM

## 2022-09-27 PROCEDURE — 99213 OFFICE O/P EST LOW 20 MIN: CPT | Performed by: FAMILY MEDICINE

## 2022-09-27 PROCEDURE — 86481 TB AG RESPONSE T-CELL SUSP: CPT

## 2022-09-27 PROCEDURE — 36415 COLL VENOUS BLD VENIPUNCTURE: CPT

## 2022-09-27 ASSESSMENT — ENCOUNTER SYMPTOMS
DIAPHORESIS: 0
SHORTNESS OF BREATH: 0
CHILLS: 0
COUGH: 0
CHEST TIGHTNESS: 0
ADENOPATHY: 0
FATIGUE: 1
FEVER: 0

## 2022-09-27 ASSESSMENT — PAIN SCALES - GENERAL: PAINLEVEL: NO PAIN (0)

## 2022-09-27 NOTE — PROGRESS NOTES
"  Assessment & Plan     Abnormal Chest X-ray  Mantoux: positive  - CT Chest w Contrast; Future  - Will also get a TB Gold blood test when she comes in for her CT.      KIMBERLY WEI, DO  [facility] - [place] [facility]   Jessy is a 32 year old presenting for the following health issues:  Consult      HPI     Jessy is a 32-year-old female being seen today following a positive TB skin test and abnormal chest x-ray.  Radiologist recommended a chest CT for follow-up.  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  Formally she worked here at [employer] in administration.  No patient contact.  No known tuberculosis exposures.  She reports she did live in [place] for about 2 weeks back in 2013, although she has had at least a couple negative TB skin tests since then as well as chest x-rays for acute illness symptoms.  She does not have any issues with coughing, weight loss, fever or chills, or night sweats.    Chest x-ray from 9/26/2022 read as a questionable small nodule between the anterior aspects of the second and third ribs on the right not seen on previous x-ray 4/30/2021.  CT chest recommended for follow-up.    Review of Systems   Constitutional: Positive for fatigue. Negative for chills, diaphoresis and fever.   Respiratory: Negative for cough, chest tightness and shortness of breath.    Hematological: Negative for adenopathy.            Objective    /70 (BP Location: Right arm, Patient Position: Chair)   Pulse 68   Temp 97  F (36.1  C)   Resp 15   Ht 1.676 m (5' 6\")   Wt 86.2 kg (190 lb)   SpO2 97%   BMI 30.67 kg/m    Body mass index is 30.67 kg/m .  Physical Exam  Constitutional:       General: She is not in acute distress.     Appearance: Normal appearance.   HENT:      Head: Normocephalic and atraumatic.   Cardiovascular:      Rate and Rhythm: Normal rate and regular rhythm.      Heart sounds: Normal heart " sounds. No murmur heard.  Pulmonary:      Effort: Pulmonary effort is normal.      Breath sounds: Normal breath sounds. No stridor. No wheezing, rhonchi or rales.   Lymphadenopathy:      Cervical: No cervical adenopathy.   Neurological:      Mental Status: She is alert and oriented to person, place, and time.   Psychiatric:         Mood and Affect: Mood normal.         Behavior: Behavior normal.

## 2022-09-27 NOTE — NURSING NOTE
"Chief Complaint   Patient presents with     Consult       Initial /70 (BP Location: Right arm, Patient Position: Chair)   Pulse 68   Temp 97  F (36.1  C)   Resp 15   Ht 1.676 m (5' 6\")   Wt 86.2 kg (190 lb)   SpO2 97%   BMI 30.67 kg/m   Estimated body mass index is 30.67 kg/m  as calculated from the following:    Height as of this encounter: 1.676 m (5' 6\").    Weight as of this encounter: 86.2 kg (190 lb).  Medication Reconciliation: complete  Camilla Villeda LPN    "

## 2022-09-28 ENCOUNTER — HOSPITAL ENCOUNTER (OUTPATIENT)
Dept: CT IMAGING | Facility: HOSPITAL | Age: 32
Discharge: HOME | End: 2022-09-28
Attending: FAMILY MEDICINE | Admitting: FAMILY MEDICINE
Payer: COMMERCIAL

## 2022-09-28 DIAGNOSIS — R76.11 MANTOUX: POSITIVE: ICD-10-CM

## 2022-09-28 DIAGNOSIS — R93.89 ABNORMAL CXR (CHEST X-RAY): ICD-10-CM

## 2022-09-28 PROCEDURE — 71250 CT THORAX DX C-: CPT

## 2022-09-29 LAB
GAMMA INTERFERON BACKGROUND BLD IA-ACNC: 0.02 IU/ML
M TB IFN-G BLD-IMP: NEGATIVE
M TB IFN-G CD4+ BCKGRND COR BLD-ACNC: 9.98 IU/ML
MITOGEN IGNF BCKGRD COR BLD-ACNC: 0 IU/ML
MITOGEN IGNF BCKGRD COR BLD-ACNC: 0.01 IU/ML
QUANTIFERON MITOGEN: 10 IU/ML
QUANTIFERON NIL TUBE: 0.02 IU/ML
QUANTIFERON TB1 TUBE: 0.02 IU/ML
QUANTIFERON TB2 TUBE: 0.03

## 2022-09-30 ENCOUNTER — MYC MEDICAL ADVICE (OUTPATIENT)
Dept: FAMILY MEDICINE | Facility: OTHER | Age: 32
End: 2022-09-30

## 2022-09-30 ENCOUNTER — TELEPHONE (OUTPATIENT)
Dept: FAMILY MEDICINE | Facility: OTHER | Age: 32
End: 2022-09-30

## 2022-09-30 NOTE — LETTER
[facility] [place]  [address]  [place] [address]  [phone]        September 30, 2022    Regarding:  Gale Adams  [address] [facility] [address]          To Whom It May Concern:    Gale MCGRATH Bryan is cleared to return to school and/or work.      Sincerely,        KIMBERLY WEI, DO

## 2022-09-30 NOTE — TELEPHONE ENCOUNTER
Patient calling back for results of labs and CT from 9/28.    Patient would like to know if she is able to return to work and would need a letter from the provider for clearance. Patient would like letter sent through [employer].     Please review lab and CT results and advise. Thank you.

## 2022-09-30 NOTE — TELEPHONE ENCOUNTER
Spoke with patient via telephone.  Updated that return to work letter has been sent to My Chart.  Patient relayed understanding of Providers notes for labs/CT  No further issues at calls end.

## 2022-09-30 NOTE — TELEPHONE ENCOUNTER
9:42 AM    Reason for Call: Phone Call    Description: would like imaging and lab results    Was an appointment offered for this call? No  If yes : Appointment type              Date    Preferred method for responding to this message: Telephone Call  What is your phone number ?[phone]    If we cannot reach you directly, may we leave a detailed response at the number you provided? Yes    Can this message wait until your PCP/provider returns, if available today? Not applicable    Bridget Maloney

## 2022-09-30 NOTE — TELEPHONE ENCOUNTER
Labs/CT resulted.  Please see my annotations.  Okay to work immediately.  Please write letter for her.  No future TB skin tests, should do blood test instead.

## 2022-09-30 NOTE — TELEPHONE ENCOUNTER
[facility] [place]  [address]  [place] [address]  [phone]        September 30, 2022    Regarding:  Gale Adams  [address] [facility] [address]          To Whom It May Concern:    Gale MCGRATH Bryan is cleared to return to school and/or work.      Sincerely,        KIMBERLY WEI, DO

## 2022-10-12 ENCOUNTER — TELEPHONE (OUTPATIENT)
Dept: FAMILY MEDICINE | Facility: OTHER | Age: 32
End: 2022-10-12

## 2022-10-12 NOTE — TELEPHONE ENCOUNTER
"Patient does not meet criteria for antiviral. Would you consider treatment due to diagnosis of Hashimoto? Care advice given to manage symptoms and relayed quarantine guidelines.   Please advise, thank you.     Call placed to patient to discuss positive covid 19 results.     Underlying Medical Conditions: hypothyroidism due to Hoshimoto's thyroiditis    Patient testing positive on 10/11/22      Test by:  At home test    Start of Symptoms: 10/11/22    Symptoms to include: fever, 102.4, body aches, slight cough    Covid 19 Vaccination Status:  pfizer initial X2, booster X1    Are you pregnant, breastfeeding or trying to conceive? No     Symptom Management:  Cough drops, ibuprofen, rest, fluids    Appointment Scheduled:      Pharmacy: [facility]    Confirmed with Pharmacy: Paxlovid & Molnupiravir in stock    Relayed instructions below. Patient relayed understanding. No further concerns with Quarantine guidelines.     Instructions for Patients  Your COVID-19 test was positive. This means you have the virus. Please follow the \"How can I take care of myself\" and \"How do I self-isolate?\" guidelines in these instructions.     What treatments are available?  Over-the-counter medicines may help with your symptoms such as runny or stuffy nose, cough, chills, and fever. Talk to your care team about your options.      Some people are at high risk for severe illness (for example if you have a weak immune system, you're 65 or older, or you have certain medical problems). If your risk it high and your symptoms started in the last 5 to 7 days, we strongly recommend for you to get COVID treatment as soon as possible before you get really sick. Paxlovid, Molnupiravir and the monoclonal antibody treatments are proven safe and effective, make you feel better faster, and prevent hospitalization and death.           What are the symptoms of COVID-19?  Symptoms can include fever, cough, shortness of breath, chills, headache, muscle " pain sore throat, fatigue, runny or stuffy nose, and loss of taste and smell. Other less common symptoms include nausea, vomiting, or diarrhea (watery stools).     Know when to call 911. Emergency warning signs include:    Trouble breathing or shortness of breath    Pain or pressure in the chest that doesn't go away    Feeling confused like you haven't felt before, or not being able to wake up    Bluish-colored lips or face     How can I take care of myself?  1. Get lots of rest. Drink extra fluids (unless a doctor has told you not to).  2. Take Tylenol (acetaminophen) for fever or pain. If you have liver or kidney problems, ask your family doctor if it's okay to take Tylenol              Adults:              650 mg (two 325 mg pills or tablets) every 4 to 6 hours, or...              1,000 mg (two 500 mg pills or tablets) every 8 hours as needed.  Note: Don't take more than 3,000 mg in one day. Acetaminophen is found in many medicines (both prescribed and over the counter). Read all labels to be sure you don't take too much.  For children, check the Tylenol bottle for the right dose. The dose is based on the child's age or weight.  3. Take over the counter medicines for your symptoms as needed. Talk to your pharmacist.  4. If you have other health problems (like cancer, heart failure, an organ transplant, or severe kidney disease): Call your specialty clinic if you don't feel better in the next 2 days.     These guidelines are for isolating and quarantining before returning to work, school or .     For employers, schools and day cares: This is an official notice for this person's medical guidelines for returning in-person.     For health care sites: The CDC gives different isolation and quarantine guidelines for healthcare sites, please check with these sites before arriving.      How do I self-isolate?  You isolate when you have symptoms of COVID or a test shows you have COVID, even if you don't have  symptoms.   1. If you DO have symptoms:  ? Stay home and away from others  ? For at least 5 days after your symptoms started, AND   ? You are fever free for 24 hours (with no medicine that reduces fever), AND  ? Your other symptoms are better.  ? Wear a mask for 10 full days any time you are around others.  2. If you DON'T have symptoms:  ? Stay at home and away from others for at least 5 days after your positive test.  ? Wear a mask for 10 full days any time you are around others.     How and when do I quarantine?  You quarantine when you may have been exposed to the virus and DON'T have symptoms.   1. Stay home and away from others.   2. You must quarantine for 5 days after your last contact with a person who has COVID.  ? Note: If you are fully vaccinated, you don't need to quarantine. You should still follow the steps below.   3. Wear a mask for 10 full days any time you're around others.  4. Get tested at least 5 days after you were exposed, even if you don't have symptoms.   5. If you start to have symptoms, isolate right away and get tested.     Where can I get more information?    [facility] COVID-19 Resource Hub: [email or web address]     CDC Quarantine & Isolation: https://www.cdc.gov/coronavirus/2019-ncov/your-health/quarantine-isolation.html     [facility] - What to Do If You're Sick: https://www.cdc.gov/coronavirus/2019-ncov/if-you-are-sick/index.html    [facility] clinical trials (COVID-19 research studies): [email or web address]/umn-clinical-trials    [place] Department of Health COVID-19 Public Hotline: 1-535.521.3227

## 2022-10-12 NOTE — PROGRESS NOTES
"Jessy is a 32 year old who is being evaluated via a billable telephone visit.      What phone number would you like to be contacted at? [phone]  How would you like to obtain your AVS? MyChart    Assessment & Plan     Infection due to 2019 novel coronavirus  Please see the CDC and/or the [place] Dept of Health websites for additional information about COVID and/or ANTIVIRAL therapy.   New medication education completed with potential risks, benefits, administration, and potential side effects.   Avoid pregnancy for this entire cycle. Use condoms or avoid intercourse.  Adverse events were observed following exposure to nirmatrelvir in some embryo-fetal developmental toxicity studies according to the FDA. Refer to the Ritonavir patient handouts for information related to ritonavir and pregnancy.    - nirmatrelvir and ritonavir (PAXLOVID) therapy pack; Take 3 tablets by mouth 2 times daily for 5 days (Take 2 Nirmatrelvir tablets and 1 Ritonavir tablet twice daily for 5 days)  Dispense: 30 each; Refill: 0    Obesity -Class 1 BMI of 30.0 to 30.9 in adult    Hypothyroidism due to Hashimoto's thyroiditis  Noted.     Prescription drug management     BMI:   Estimated body mass index is 30.67 kg/m  as calculated from the following:    Height as of 9/27/22: 1.676 m (5' 6\").    Weight as of 9/27/22: 86.2 kg (190 lb).   Reviewed with patient. lifestyle changes of weight loss and exercise are recommended for most.     No follow-ups on file.    Sweetie Joseph, CNP  [facility] - [place] [facility]    Subjective   Jessy is a 32 year old, presenting for the following health issues:  No chief complaint on file.      HPI   COVID-19 Symptom Review  How many days ago did these symptoms start? 10/11/22  Are any of the following symptoms significant for you?    New or worsening difficulty breathing? No    Worsening cough? Yes, productive-greenish clear.     Fever or chills? Yes, the highest temperature was 101.3F.     Headache: " YES    Sore throat: No    Chest pain: No    Diarrhea: No     Body aches? YES    What treatments has patient tried? Nonsteroidals and rest. Fluids and cough drops   Does patient live in a nursing home, group home, or shelter? No  Does patient have a way to get food/medications during quarantined? Yes, I have a friend or family member who can help me.    Underlying Medical Conditions: obesity, hypothyroidism due to Hoshimoto's thyroiditis     Patient testing positive on 10/11/22       Test by:  At home test     Start of Symptoms: 10/11/22     Symptoms to include: fever, 102.4, body aches, slight cough     Covid 19 Vaccination Status:  pfizer initial X2, booster X1     Are you pregnant, breastfeeding or trying to conceive? No . Just finished period. Has not had intercourse since start of period. Will use condoms until next cycle.      Symptom Management:  Cough drops, ibuprofen, rest, fluids     Appointment Scheduled:       Pharmacy: [facility]     Confirmed with Pharmacy: Paxlovid & Molnupiravir in stock    Last Comprehensive Metabolic Panel:  Sodium   Date Value Ref Range Status   02/05/2020 138 133 - 144 mmol/L Final     Potassium   Date Value Ref Range Status   02/05/2020 3.8 3.4 - 5.3 mmol/L Final     Chloride   Date Value Ref Range Status   02/05/2020 105 94 - 109 mmol/L Final     Carbon Dioxide   Date Value Ref Range Status   02/05/2020 27 20 - 32 mmol/L Final     Anion Gap   Date Value Ref Range Status   02/05/2020 6 3 - 14 mmol/L Final     Glucose   Date Value Ref Range Status   02/05/2020 80 70 - 99 mg/dL Final     Urea Nitrogen   Date Value Ref Range Status   02/05/2020 10 7 - 30 mg/dL Final     Creatinine   Date Value Ref Range Status   02/05/2020 0.66 0.52 - 1.04 mg/dL Final     GFR Estimate   Date Value Ref Range Status   02/05/2020 >90 >60 mL/min/[1.73_m2] Final     Comment:     Non  GFR Calc  Starting 12/18/2018, serum creatinine based estimated GFR (eGFR) will be   calculated using  the Chronic Kidney Disease Epidemiology Collaboration   (CKD-EPI) equation.       Calcium   Date Value Ref Range Status   02/05/2020 8.5 8.5 - 10.1 mg/dL Final     Liver Function Studies - Recent Labs   Lab Test 06/17/16  0902   PROTTOTAL 8.5   ALBUMIN 4.3   BILITOTAL 0.3   ALKPHOS 73   AST 15   ALT 20     Current Outpatient Medications   Medication     Cholecalciferol (VITAMIN D3) 2000 units CAPS     levothyroxine (SYNTHROID/LEVOTHROID) 112 MCG tablet     Prenatal Vit-Fe Fumarate-FA (PRENATAL 19) CHEW     No current facility-administered medications for this visit.         Review of Systems   Constitutional, HEENT, cardiovascular, pulmonary, gi and gu systems are negative, except as otherwise noted.      Objective           Vitals:  No vitals were obtained today due to virtual visit.    Physical Exam   alert and no distress  PSYCH: Alert and oriented times 3; coherent speech, normal   rate and volume, able to articulate logical thoughts, able   to abstract reason, no tangential thoughts, no hallucinations   or delusions  Her affect is normal  RESP: No cough, no audible wheezing, able to talk in full sentences. Nasal congestion evident.   Remainder of exam unable to be completed due to telephone visits          Phone call duration: 9 minutes

## 2022-10-13 ENCOUNTER — VIRTUAL VISIT (OUTPATIENT)
Dept: FAMILY MEDICINE | Facility: OTHER | Age: 32
End: 2022-10-13
Attending: NURSE PRACTITIONER
Payer: COMMERCIAL

## 2022-10-13 DIAGNOSIS — E66.09 CLASS 1 OBESITY DUE TO EXCESS CALORIES WITHOUT SERIOUS COMORBIDITY WITH BODY MASS INDEX (BMI) OF 30.0 TO 30.9 IN ADULT: ICD-10-CM

## 2022-10-13 DIAGNOSIS — E06.3 HYPOTHYROIDISM DUE TO HASHIMOTO'S THYROIDITIS: Primary | ICD-10-CM

## 2022-10-13 DIAGNOSIS — U07.1 INFECTION DUE TO 2019 NOVEL CORONAVIRUS: ICD-10-CM

## 2022-10-13 DIAGNOSIS — E66.811 CLASS 1 OBESITY DUE TO EXCESS CALORIES WITHOUT SERIOUS COMORBIDITY WITH BODY MASS INDEX (BMI) OF 30.0 TO 30.9 IN ADULT: ICD-10-CM

## 2022-10-13 PROBLEM — E03.9 HYPOTHYROIDISM, UNSPECIFIED TYPE: Status: RESOLVED | Noted: 2017-03-15 | Resolved: 2022-10-13

## 2022-10-13 PROCEDURE — 99213 OFFICE O/P EST LOW 20 MIN: CPT | Mod: 95 | Performed by: NURSE PRACTITIONER

## 2023-01-10 ENCOUNTER — OFFICE VISIT (OUTPATIENT)
Dept: FAMILY MEDICINE | Facility: OTHER | Age: 33
End: 2023-01-10
Attending: NURSE PRACTITIONER
Payer: COMMERCIAL

## 2023-01-10 VITALS
BODY MASS INDEX: 35.52 KG/M2 | SYSTOLIC BLOOD PRESSURE: 104 MMHG | WEIGHT: 221 LBS | TEMPERATURE: 97.7 F | OXYGEN SATURATION: 98 % | DIASTOLIC BLOOD PRESSURE: 72 MMHG | HEART RATE: 63 BPM | HEIGHT: 66 IN

## 2023-01-10 DIAGNOSIS — L98.9 SKIN LESION: ICD-10-CM

## 2023-01-10 DIAGNOSIS — R91.8 PULMONARY NODULES: Primary | ICD-10-CM

## 2023-01-10 PROCEDURE — 99213 OFFICE O/P EST LOW 20 MIN: CPT | Performed by: NURSE PRACTITIONER

## 2023-01-10 ASSESSMENT — PAIN SCALES - GENERAL: PAINLEVEL: NO PAIN (0)

## 2023-01-10 NOTE — PROGRESS NOTES
"  Assessment & Plan     Pulmonary nodules  Noted pulmonary nodules on CT in September.  Plan to repeat at 6 months for comparison   Nodules are small in size, but she does have a family of non-smoking lung cancer  - CT Chest w/o Contrast; Future    Skin lesion  Raised lesion to left forearm increasing in size recently  Referral to surgeon for possible removal of skin lesion  DDX: dermatofibroma, atypical mole, scar tissue  - Adult General Surg Referral       BMI:   Estimated body mass index is 35.67 kg/m  as calculated from the following:    Height as of this encounter: 1.676 m (5' 6\").    Weight as of this encounter: 100.2 kg (221 lb).       See Patient Instructions    No follow-ups on file.    [facility] - [place] [facility]   Jessy is a 32 year old, presenting for the following health issues:  lesion on left forearm    *Discuss imaging from 9/28  She had a chest XR for possible concern for TB due to a positive mantue   She did have negative quantiferon for TB  Chest XR showed lung nodule and CT to compare showed 3 pulmonary nodule.  She denies any symptoms, but her uncle has lung cancer non-smoker and health and active.    Consider repeating chest CT at 6 month time for a comparison CT       Skin Lesion  Onset/Duration: 3-4 months ago notice this skin lesion on her arm. She feels the lesion has grown in the last month  Description  Location: left forearm area  Color: pink  Border description: raised, red  Character: red hard lump  Itching: no  Bleeding:  No  Intensity:  moderate  Progression of Symptoms:  same  Accompanying signs and symptoms:   Bleeding: No  Scaling: No  Excessive sun exposure/tanning: No  Sunscreen used: No  History:           Any previous history of skin cancer: No  Any family history of melanoma: YES- uncle and grandparents have had melanoma   Previous episodes of similar lesion: No  Precipitating or alleviating factors: none  Therapies tried " "and outcome: none        Review of Systems   CONSTITUTIONAL: NEGATIVE for fever, chills, change in weight  INTEGUMENTARY/SKIN: raised firm lesion to left forearm   RESP: NEGATIVE for significant cough or SOB  CV: NEGATIVE for chest pain, palpitations or peripheral edema      Objective    /72 (BP Location: Right arm, Patient Position: Sitting, Cuff Size: Adult Regular)   Pulse 63   Temp 97.7  F (36.5  C) (Tympanic)   Ht 1.676 m (5' 6\")   Wt 100.2 kg (221 lb)   SpO2 98%   BMI 35.67 kg/m    Body mass index is 35.67 kg/m .  Physical Exam   GENERAL: healthy, alert and no distress  RESP: lungs clear to auscultation - no rales, rhonchi or wheezes  CV: regular rate and rhythm, normal S1 S2, no S3 or S4, no murmur, click or rub, no peripheral edema and peripheral pulses strong  SKIN: round firm non-pigmented raised lesion to left forearm       PROCEDURE: CT CHEST W/O CONTRAST 9/28/2022 7:25 AM     HISTORY: Mantoux: positive; Abnormal CXR (chest x-ray)     COMPARISONS: None.     Meds/Dose Given:     TECHNIQUE: CT scan of the chest without IV contrast sagittal coronal  reconstructions were obtained     FINDINGS: There is a 3 x 4 mm diameter nodule in the right upper lobe  on series 3 axial image 71. There is a 5 mm diameter nodule in the  right lower lobe on series 3 axial image 131. There is a 4 mm diameter  nodule seen in the left lower lobe on series 3 axial image 143. The  hilar and mediastinal lymph nodes are normal. The heart is normal in  size. Axillary and supraclavicular lymph nodes are normal. The  regional skeleton is intact. The upper portion of the liver spleen and  pancreas are normal.                                                                        IMPRESSION: 3 pulmonary nodules are noted. These are most likely  benign if the patient has no history of cancer.     MAYA KAY MD               "

## 2023-01-19 ENCOUNTER — OFFICE VISIT (OUTPATIENT)
Dept: SURGERY | Facility: OTHER | Age: 33
End: 2023-01-19
Attending: NURSE PRACTITIONER
Payer: COMMERCIAL

## 2023-01-19 VITALS
HEIGHT: 66 IN | SYSTOLIC BLOOD PRESSURE: 116 MMHG | OXYGEN SATURATION: 97 % | HEART RATE: 68 BPM | DIASTOLIC BLOOD PRESSURE: 64 MMHG | WEIGHT: 200 LBS | BODY MASS INDEX: 32.14 KG/M2

## 2023-01-19 DIAGNOSIS — L98.9 SKIN LESION: Primary | ICD-10-CM

## 2023-01-19 PROCEDURE — 99212 OFFICE O/P EST SF 10 MIN: CPT | Mod: 25 | Performed by: CLINICAL NURSE SPECIALIST

## 2023-01-19 PROCEDURE — 88305 TISSUE EXAM BY PATHOLOGIST: CPT | Mod: TC | Performed by: CLINICAL NURSE SPECIALIST

## 2023-01-19 PROCEDURE — 88305 TISSUE EXAM BY PATHOLOGIST: CPT | Mod: 26 | Performed by: PATHOLOGY

## 2023-01-19 PROCEDURE — 11104 PUNCH BX SKIN SINGLE LESION: CPT | Performed by: CLINICAL NURSE SPECIALIST

## 2023-01-19 ASSESSMENT — PAIN SCALES - GENERAL: PAINLEVEL: NO PAIN (0)

## 2023-01-19 NOTE — PATIENT INSTRUCTIONS
Thank you for allowing POONAM Reyna and our surgical team to participate in your care. Please call our health unit coordinator at [phone] with scheduling questions or the nurse at [phone] with any other questions or concerns.     POST PROCEDURE INSTRUCTIONS     Remove your dressing in 24 hours (If you have one)  Wash incision with Gentle Cleanser Twice Daily (Cetaphil, Baby Shampoo, etc)  Apply Bacitracin Ointment Three Times Daily; After 1 week   Cover with a clean dressing if in a dirty jamil environment or when wet/soiled  Keep incision clean and dry              Do NOT soak in water such as a tub bath or swimming              Do NOT put make-up, powders, hairspray, lotions, etc on the incision      You can use acetaminophen(Tylenol) or the prescription you received for pain.      If you have any bleeding, cover the wound with clean gauze and hold pressure for 10 Minutes. If the bleeding does not stop or is heavy and profuse, call the clinic or go to the Urgent Care/Emergency Department.     SIGNS OF INFECTION ARE:  Redness, swelling, red streaks, pus, drainage, warmth, fever, increased pain, foul smell.   Contact your primary health care provider if you notice any of the warning signs.      FOLLOW - UP  Follow-up in clinic for a nurse only visit in 7 days for suture removal.    Pathology results will be called to you when they are back. Usually 7-10 days.

## 2023-01-19 NOTE — PROGRESS NOTES
Surgery Consult Clinic Note      RE: Gale Adams  : 1990  [place]: 2023      Chief Complaint:  Left forearm lesion    History of Present Illness:  I am seeing Gale Adams at the request of Danii Landa NP for evaluation of a lesion to the left forearm for which she is requesting evaluation and consideration for removal. This area has grown in size and become more firm in the last year.          She denies a personal of skin cancer.  Her grandparents and uncle have had melanoma.  She reports that she has had sunburns in this location in the past.  She does wear sunscreen when outside.    Medical history:  Past Medical History:   Diagnosis Date     Acute upper respiratory infections of unspecified site 2001     Need for prophylactic vaccination and inoculation against other specified disease 2003     Thyroid disease 2016       Surgical history:  Past Surgical History:   Procedure Laterality Date     CARDIAC SURGERY      tachacardia in the past     SOFT TISSUE SURGERY      wisdom teeth extraction       Family history:  Family History   Problem Relation Age of Onset     Cerebrovascular Disease Maternal Grandmother         CVA     Diabetes Maternal Grandmother      Breast Cancer Maternal Grandmother      Thyroid Disease Maternal Grandmother      Hypertension Father      Diabetes Father      Hyperlipidemia Mother      Depression Mother      Obesity Mother      Hypertension Mother      Thyroid Disease Mother      Other Cancer Paternal Grandmother      Coronary Artery Disease No family hx of      Colon Cancer No family hx of      Prostate Cancer No family hx of      Anxiety Disorder No family hx of      Mental Illness No family hx of      Substance Abuse No family hx of      Anesthesia Reaction No family hx of      Asthma No family hx of      Osteoporosis No family hx of      Genetic Disorder No family hx of        Medications:  Current Outpatient Medications   Medication Sig Dispense  "Refill     Cholecalciferol (VITAMIN D3) 2000 units CAPS Take 2,000 Units by mouth daily       levothyroxine (SYNTHROID/LEVOTHROID) 112 MCG tablet Take 112 mcg by mouth daily       Prenatal Vit-Fe Fumarate-FA (PRENATAL 19) CHEW Take 1 chew tab by mouth daily       Allergies:  The patient is allergic to chlorine and amoxicillin.  .  Social history:  Social History     Tobacco Use     Smoking status: Never     Smokeless tobacco: Never   Substance Use Topics     Alcohol use: Yes     Comment: Very minimal     Marital status: .      Review of Systems:    Constitutional, HEENT, cardiovascular, pulmonary, gi and gu systems are negative, except as otherwise noted.    Physical Examination:  /64 (BP Location: Right arm, Patient Position: Chair, Cuff Size: Adult Regular)   Pulse 68   Ht 1.676 m (5' 6\")   Wt 90.7 kg (200 lb)   SpO2 97%   BMI 32.28 kg/m    General: Alert and orientedx4, no acute distress, well-developed/well-nourished, ambulating without assistance  HEENT: normocephalic atraumatic, extraocular movements intact, Sclerae anicteric; Trachea mideline  Chest:   Respirations even and unlabored  Extremities: Cursory exam unremarkable.   Skin: Warm, dry, approximately 4 mm round lesion/nodule noted to left medial forearm  Neuro: CN 2-12 grossly intact, no focal deficit, GCS 15  Psych: Pleasant, calm, asks appropriate questions      Assessment/Plan:  #1 Left forearm skin lesion    After discussing the risks of excision including but not limited to, bleeding,  recurrence, need for further resection, wound healing, scar formation and chronic pain she wishes to proceed with the lesion removal.  Her questions were asked and answered.    A time out was taken.  The site was prepped and draped in the normal sterile fashion.   I then infiltrated the tissue with 1% lidocaine with 1:100,000 epinephrine.  A 4 mm punch was used to create a round incision around the lesion.  The tissue was elevated with pick ups and " cut the tissue with sterile curved scissors.  Biopsy was completed to site.  Hemostasis was achieved with direct pressure and 1 suture using 4-0 ethilon.  The specimen(s) was placed in formalin and sent to pathology.     The patient became a little sweaty, which was resolved with a cool cloth to the forehead and deep breathing exercises.  We reviewed signs/symptoms of infection and bleeding.  She will return to clinic or remove the suture herself in 7 days.      Karine Edge [facility]  [address] [place] [address]    Referring Provider:  [place] [address]     Primary Care Provider:  Danii Landa

## 2023-01-24 LAB
PATH REPORT.COMMENTS IMP SPEC: NORMAL
PATH REPORT.FINAL DX SPEC: NORMAL
PATH REPORT.GROSS SPEC: NORMAL
PATH REPORT.MICROSCOPIC SPEC OTHER STN: NORMAL
PATH REPORT.RELEVANT HX SPEC: NORMAL
PHOTO IMAGE: NORMAL

## 2023-04-04 ENCOUNTER — OFFICE VISIT (OUTPATIENT)
Dept: FAMILY MEDICINE | Facility: OTHER | Age: 33
End: 2023-04-04
Attending: NURSE PRACTITIONER
Payer: COMMERCIAL

## 2023-04-04 VITALS
TEMPERATURE: 96.8 F | OXYGEN SATURATION: 97 % | WEIGHT: 210 LBS | HEART RATE: 78 BPM | BODY MASS INDEX: 33.89 KG/M2 | SYSTOLIC BLOOD PRESSURE: 114 MMHG | DIASTOLIC BLOOD PRESSURE: 70 MMHG

## 2023-04-04 DIAGNOSIS — H65.00 ACUTE SEROUS OTITIS MEDIA, RECURRENCE NOT SPECIFIED, UNSPECIFIED LATERALITY: ICD-10-CM

## 2023-04-04 DIAGNOSIS — J06.9 URI WITH COUGH AND CONGESTION: Primary | ICD-10-CM

## 2023-04-04 PROCEDURE — 99213 OFFICE O/P EST LOW 20 MIN: CPT | Performed by: NURSE PRACTITIONER

## 2023-04-04 RX ORDER — LEVOTHYROXINE SODIUM 125 UG/1
TABLET ORAL
COMMUNITY
Start: 2023-02-03 | End: 2023-04-04

## 2023-04-04 RX ORDER — AZITHROMYCIN 250 MG/1
TABLET, FILM COATED ORAL
Qty: 6 TABLET | Refills: 0 | Status: SHIPPED | OUTPATIENT
Start: 2023-04-04 | End: 2023-04-09

## 2023-04-04 RX ORDER — LEVOTHYROXINE SODIUM 125 UG/1
1 TABLET ORAL DAILY
COMMUNITY
Start: 2023-02-03

## 2023-04-04 RX ORDER — ALBUTEROL SULFATE 90 UG/1
2 AEROSOL, METERED RESPIRATORY (INHALATION) EVERY 6 HOURS PRN
Qty: 18 G | Refills: 0 | Status: SHIPPED | OUTPATIENT
Start: 2023-04-04 | End: 2023-05-12

## 2023-04-04 NOTE — PATIENT INSTRUCTIONS
For right ear pain - if you develop increased pain ok to use antibiotic if no improvement please let me know

## 2023-04-04 NOTE — PROGRESS NOTES
"  Assessment & Plan     URI with cough and congestion  Cold symptoms - continue with symptomatic care -stay hydrated and rest  Ok to use inhaler as needed for cough and sob  - albuterol (PROAIR HFA/PROVENTIL HFA/VENTOLIN HFA) 108 (90 Base) MCG/ACT inhaler; Inhale 2 puffs into the lungs every 6 hours as needed for shortness of breath, wheezing or cough    Acute serous otitis media, recurrence not specified, unspecified laterality  Right ear slightly erythema - discussed watch and wait.  Sent in antibiotic if increased pain to right ear can start.  Follow up if no improvement   - azithromycin (ZITHROMAX) 250 MG tablet; Take 2 tablets (500 mg) by mouth daily for 1 day, THEN 1 tablet (250 mg) daily for 4 days.       BMI:   Estimated body mass index is 33.89 kg/m  as calculated from the following:    Height as of 1/19/23: 1.676 m (5' 6\").    Weight as of this encounter: 95.3 kg (210 lb).       See Patient Instructions    No follow-ups on file.    DAMON Soler [facility] - LUZ ELENA Jiménez is a 32 year old, presenting for the following health issues:  Pharyngitis (/), congestion, and Cough         View : No data to display.              HPI     Acute Illness  Acute illness concerns: cough, sore throat, congestion, ears plugged  Onset/Duration: 2 days  Symptoms:  Fever: No  Chills/Sweats: No  Headache (location?): YES  Sinus Pressure: YES  Conjunctivitis:  No  Ear Pain: YES: bilateral  Rhinorrhea: YES  Congestion: YES  Sore Throat: YES  Cough: YES-productive of green sputum  Wheeze: No  Decreased Appetite: YES  Nausea: No  Vomiting: No  Diarrhea: No  Dysuria/Freq.: No  Dysuria or Hematuria: No  Fatigue/Achiness: YES  Sick/Strep Exposure: No  Therapies tried and outcome: None  2 negative home covid test  Alkalizer cold and cough - initially did help, but today no relief         Review of Systems   CONSTITUTIONAL: NEGATIVE for fever, chills, change in weight  INTEGUMENTARY/SKIN: " NEGATIVE for worrisome rashes, moles or lesions  EYES: NEGATIVE for vision changes or irritation  ENT/MOUTH: right ear pressure, nasal congestion, sinus pressure and sore throat with cough   RESP:cough and chest congestion with chest tightness  CV: NEGATIVE for chest pain, palpitations or peripheral edema  GI: NEGATIVE for nausea, abdominal pain, heartburn, or change in bowel habits  : denies dysuria   MUSCULOSKELETAL: intermittent body ache  NEURO: fatigued and headaches       Objective    /70   Pulse 78   Temp 96.8  F (36  C) (Tympanic)   Wt 95.3 kg (210 lb)   SpO2 97%   BMI 33.89 kg/m    Body mass index is 33.89 kg/m .  Physical Exam   GENERAL: alert and ill appearing   HENT: normal cephalic/atraumatic, right ear: mild erythema, left ear: normal: no effusions, no erythema, normal landmarks, nose and mouth without ulcers or lesions, oropharynx clear and oral mucous membranes moist  NECK: no adenopathy, no asymmetry, masses, or scars and thyroid normal to palpation  RESP: lungs clear to auscultation - no rales, rhonchi or wheezes  CV: regular rate and rhythm, normal S1 S2, no S3 or S4, no murmur, click or rub, no peripheral edema and peripheral pulses strong  ABDOMEN: soft, nontender, no hepatosplenomegaly, no masses and bowel sounds normal  PSYCH: mentation appears normal and fatigued

## 2023-04-10 ENCOUNTER — HOSPITAL ENCOUNTER (OUTPATIENT)
Dept: CT IMAGING | Facility: HOSPITAL | Age: 33
Discharge: HOME | End: 2023-04-10
Attending: NURSE PRACTITIONER | Admitting: NURSE PRACTITIONER
Payer: COMMERCIAL

## 2023-04-10 DIAGNOSIS — R91.8 PULMONARY NODULES: ICD-10-CM

## 2023-04-10 PROCEDURE — 71250 CT THORAX DX C-: CPT

## 2023-05-09 ASSESSMENT — ENCOUNTER SYMPTOMS
CONSTIPATION: 0
DIARRHEA: 0
HEADACHES: 0
HEMATURIA: 0
ARTHRALGIAS: 0
FREQUENCY: 0
HEMATOCHEZIA: 0
BREAST MASS: 0
FEVER: 0
WEAKNESS: 0
PARESTHESIAS: 0
ABDOMINAL PAIN: 0
SHORTNESS OF BREATH: 0
PALPITATIONS: 0
JOINT SWELLING: 0
HEARTBURN: 1
CHILLS: 0
DYSURIA: 0
MYALGIAS: 0
COUGH: 0
NAUSEA: 0
NERVOUS/ANXIOUS: 0
DIZZINESS: 0
EYE PAIN: 0
SORE THROAT: 0

## 2023-05-10 NOTE — PROGRESS NOTES
SUBJECTIVE:   CC: Jessy is an 32 year old who presents for preventive health visit.        View : No data to display.            Patient has been advised of split billing requirements and indicates understanding: Yes  Healthy Habits:     Getting at least 3 servings of Calcium per day:  Yes    Bi-annual eye exam:  Yes    Dental care twice a year:  Yes    Sleep apnea or symptoms of sleep apnea:  None    Diet:  Regular (no restrictions)    Frequency of exercise:  2-3 days/week    Duration of exercise:  15-30 minutes    Taking medications regularly:  Yes    Medication side effects:  None    PHQ-2 Total Score: 0    Additional concerns today:  No          Hypothyroidism Follow-up  Current dose 125 mcg daily     Since last visit, patient describes the following symptoms: Weight stable, no hair loss, no skin changes, no constipation, no loose stools    Thyroid monitored with endocrine     Last TSH and T4 on 4/3/23    TSH 1.35 normal     T4 1.0      Today's PHQ-2 Score:       5/9/2023     9:45 AM   PHQ-2 ( 1999 [employer])   Q1: Little interest or pleasure in doing things 0   Q2: Feeling down, depressed or hopeless 0   PHQ-2 Score 0   Q1: Little interest or pleasure in doing things Not at all   Q2: Feeling down, depressed or hopeless Not at all   PHQ-2 Score 0       Have you ever done Advance Care Planning? (For example, a Health Directive, POLST, or a discussion with a medical provider or your loved ones about your wishes): No, advance care planning information given to patient to review.  Patient declined advance care planning discussion at this time.    Social History     Tobacco Use     Smoking status: Never     Smokeless tobacco: Never   Vaping Use     Vaping status: Never Used   Substance Use Topics     Alcohol use: Yes     Comment: Very minimal             5/9/2023     9:45 AM   Alcohol Use   Prescreen: >3 drinks/day or >7 drinks/week? No          View : No data to display.              Reviewed orders with patient.   Reviewed health maintenance and updated orders accordingly - Yes  Labs reviewed in EPIC  BP Readings from Last 3 Encounters:   05/12/23 108/70   04/04/23 114/70   01/19/23 116/64    Wt Readings from Last 3 Encounters:   05/12/23 95.3 kg (210 lb)   04/04/23 95.3 kg (210 lb)   01/19/23 90.7 kg (200 lb)                  Patient Active Problem List   Diagnosis     Swelling, mass, or lump in head and neck     Left knee injury     ACP (advance care planning)     Female infertility     Hypothyroidism due to Hashimoto's thyroiditis     Vitamin D insufficiency     Past Surgical History:   Procedure Laterality Date     CARDIAC SURGERY      tachacardia in the past     SOFT TISSUE SURGERY      wisdom teeth extraction       Social History     Tobacco Use     Smoking status: Never     Smokeless tobacco: Never   Vaping Use     Vaping status: Never Used   Substance Use Topics     Alcohol use: Yes     Comment: Very minimal     Family History   Problem Relation Age of Onset     Cerebrovascular Disease Maternal Grandmother         CVA     Diabetes Maternal Grandmother      Breast Cancer Maternal Grandmother      Thyroid Disease Maternal Grandmother      Hypertension Father      Diabetes Father      Hyperlipidemia Mother      Depression Mother      Obesity Mother      Hypertension Mother      Thyroid Disease Mother      Other Cancer Paternal Grandmother      Coronary Artery Disease No family hx of      Colon Cancer No family hx of      Prostate Cancer No family hx of      Anxiety Disorder No family hx of      Mental Illness No family hx of      Substance Abuse No family hx of      Anesthesia Reaction No family hx of      Asthma No family hx of      Osteoporosis No family hx of      Genetic Disorder No family hx of          Current Outpatient Medications   Medication Sig Dispense Refill     Cholecalciferol (VITAMIN D3) 2000 units CAPS Take 2,000 Units by mouth daily       levothyroxine (SYNTHROID/LEVOTHROID) 125 MCG tablet Take 1 tablet  by mouth daily       Prenatal Vit-Fe Fumarate-FA (PRENATAL 19) CHEW Take 1 chew tab by mouth daily       albuterol (PROAIR HFA/PROVENTIL HFA/VENTOLIN HFA) 108 (90 Base) MCG/ACT inhaler Inhale 2 puffs into the lungs every 6 hours as needed for shortness of breath, wheezing or cough 18 g 0     Allergies   Allergen Reactions     Chlorine Shortness Of Breath and Rash     Amoxicillin Rash       Breast Cancer Screening:    FHS-7:       5/9/2023     9:46 AM   Breast CA Risk Assessment (FHS-7)   Did any of your first-degree relatives have breast or ovarian cancer? Yes   Did any of your relatives have bilateral breast cancer? Unknown   Did any man in your family have breast cancer? No   Did any woman in your family have breast and ovarian cancer? Yes   Did any woman in your family have breast cancer before age 50 y? Unknown   Do you have 2 or more relatives with breast and/or ovarian cancer? Yes   Do you have 2 or more relatives with breast and/or bowel cancer? No       Patient under 40 years of age: Routine Mammogram Screening not recommended.   Pertinent mammograms are reviewed under the imaging tab.    History of abnormal Pap smear:       2/5/2020     1:03 PM 6/17/2016    12:00 AM 9/5/2013    12:00 AM   PAP / HPV   PAP (Historical) NIL   NIL   NIL       Reviewed and updated as needed this visit by clinical staff   Tobacco  Allergies  Meds              Reviewed and updated as needed this visit by Provider                   Review of Systems  CONSTITUTIONAL: NEGATIVE for fever, chills, change in weight  INTEGUMENTARU/SKIN: NEGATIVE for worrisome rashes, moles or lesions  EYES: skin around eyes red, dry and irritation   ENT: NEGATIVE for ear, mouth and throat problems  RESP: NEGATIVE for significant cough or SOB  BREAST: NEGATIVE for masses, tenderness or discharge  CV: NEGATIVE for chest pain, palpitations or peripheral edema  GI: NEGATIVE for nausea, abdominal pain, heartburn, or change in bowel habits   female:  "abnormal menstrual cycle, denies dysuria   MUSCULOSKELETAL: NEGATIVE for significant arthralgias or myalgia  NEURO: NEGATIVE for weakness, dizziness or paresthesias  ENDOCRINE: Hx thyroid disease  PSYCHIATRIC: NEGATIVE for changes in mood or affect     OBJECTIVE:   /70   Pulse 72   Temp 97.4  F (36.3  C) (Tympanic)   Ht 1.676 m (5' 6\")   Wt 95.3 kg (210 lb)   LMP 03/23/2023 (Exact Date)   SpO2 98%   BMI 33.89 kg/m    Physical Exam  GENERAL: healthy, alert and no distress  EYES: Eyes grossly normal to inspection, PERRL and conjunctivae and sclerae normal  HENT: ear canals and TM's normal, nose and mouth without ulcers or lesions  NECK: no adenopathy, no asymmetry, masses, or scars and large thyroid   RESP: lungs clear to auscultation - no rales, rhonchi or wheezes  CV: regular rate and rhythm, normal S1 S2, no S3 or S4, no murmur, click or rub, no peripheral edema and peripheral pulses strong  ABDOMEN: soft, nontender, no hepatosplenomegaly, no masses and bowel sounds normal   (female): normal female external genitalia, normal urethral meatus , vaginal mucosa pink, moist, well rugated and normal cervix, adnexae, and uterus without masses.  MS: no gross musculoskeletal defects noted, no edema  SKIN: no suspicious lesions or rashes  NEURO: Normal strength and tone, mentation intact and speech normal  PSYCH: mentation appears normal, affect normal/bright  LYMPH: no cervical, supraclavicular, axillary, or inguinal adenopathy    Diagnostic Test Results:  Labs reviewed in Epic    ASSESSMENT/PLAN:   (R23.8) Skin irritation  (primary encounter diagnosis)  Comment: can try clotrimazole lightly to dry itchy areas   Plan: clotrimazole (LOTRIMIN) 1 % external cream            (Z11.4) Screening for HIV (human immunodeficiency virus)  Comment: declined     (Z11.59) Need for hepatitis C screening test  Comment: declined     (E03.8,  E06.3) Hypothyroidism due to Hashimoto's thyroiditis  Comment: continue to follow up " "with endocrine     (Z12.4) Cervical cancer screening  Comment: due for screening   Plan: Pap Screen with HPV - recommended age 30 - 65         years           (Z00.00) Routine general medical examination at a health care facility  Comment: continue yearly physicals           COUNSELING:  Reviewed preventive health counseling, as reflected in patient instructions       Regular exercise       Healthy diet/nutrition      BMI:   Estimated body mass index is 33.89 kg/m  as calculated from the following:    Height as of this encounter: 1.676 m (5' 6\").    Weight as of this encounter: 95.3 kg (210 lb).   Weight management plan: Discussed healthy diet and exercise guidelines      She reports that she has never smoked. She has never used smokeless tobacco.      DAMON Soler [facility] - [place]  "

## 2023-05-12 ENCOUNTER — OFFICE VISIT (OUTPATIENT)
Dept: FAMILY MEDICINE | Facility: OTHER | Age: 33
End: 2023-05-12
Attending: NURSE PRACTITIONER
Payer: COMMERCIAL

## 2023-05-12 VITALS
DIASTOLIC BLOOD PRESSURE: 70 MMHG | OXYGEN SATURATION: 98 % | BODY MASS INDEX: 33.75 KG/M2 | WEIGHT: 210 LBS | HEIGHT: 66 IN | TEMPERATURE: 97.4 F | SYSTOLIC BLOOD PRESSURE: 108 MMHG | HEART RATE: 72 BPM

## 2023-05-12 DIAGNOSIS — Z11.4 SCREENING FOR HIV (HUMAN IMMUNODEFICIENCY VIRUS): ICD-10-CM

## 2023-05-12 DIAGNOSIS — Z11.59 NEED FOR HEPATITIS C SCREENING TEST: ICD-10-CM

## 2023-05-12 DIAGNOSIS — E06.3 HYPOTHYROIDISM DUE TO HASHIMOTO'S THYROIDITIS: ICD-10-CM

## 2023-05-12 DIAGNOSIS — Z00.00 ROUTINE GENERAL MEDICAL EXAMINATION AT A HEALTH CARE FACILITY: ICD-10-CM

## 2023-05-12 DIAGNOSIS — R23.8 SKIN IRRITATION: Primary | ICD-10-CM

## 2023-05-12 DIAGNOSIS — Z12.4 CERVICAL CANCER SCREENING: ICD-10-CM

## 2023-05-12 PROCEDURE — 87624 HPV HI-RISK TYP POOLED RSLT: CPT | Performed by: NURSE PRACTITIONER

## 2023-05-12 PROCEDURE — 99395 PREV VISIT EST AGE 18-39: CPT | Mod: 25 | Performed by: NURSE PRACTITIONER

## 2023-05-12 PROCEDURE — G0123 SCREEN CERV/VAG THIN LAYER: HCPCS | Performed by: NURSE PRACTITIONER

## 2023-05-12 PROCEDURE — 90715 TDAP VACCINE 7 YRS/> IM: CPT | Performed by: NURSE PRACTITIONER

## 2023-05-12 PROCEDURE — 90471 IMMUNIZATION ADMIN: CPT | Performed by: NURSE PRACTITIONER

## 2023-05-12 RX ORDER — CLOTRIMAZOLE 1 %
CREAM (GRAM) TOPICAL 2 TIMES DAILY
Qty: 28 G | Refills: 0 | Status: SHIPPED | OUTPATIENT
Start: 2023-05-12

## 2023-05-12 ASSESSMENT — PAIN SCALES - GENERAL: PAINLEVEL: NO PAIN (0)

## 2023-05-19 LAB
BKR LAB AP GYN ADEQUACY: NORMAL
BKR LAB AP GYN INTERPRETATION: NORMAL
BKR LAB AP HPV REFLEX: NORMAL
BKR LAB AP LMP: NORMAL
BKR LAB AP PREVIOUS ABNORMAL: NORMAL
PATH REPORT.COMMENTS IMP SPEC: NORMAL
PATH REPORT.COMMENTS IMP SPEC: NORMAL
PATH REPORT.RELEVANT HX SPEC: NORMAL

## 2023-05-23 LAB
HUMAN PAPILLOMA VIRUS 16 DNA: NEGATIVE
HUMAN PAPILLOMA VIRUS 18 DNA: NEGATIVE
HUMAN PAPILLOMA VIRUS FINAL DIAGNOSIS: NORMAL
HUMAN PAPILLOMA VIRUS OTHER HR: NEGATIVE

## 2023-05-24 ENCOUNTER — MYC MEDICAL ADVICE (OUTPATIENT)
Dept: FAMILY MEDICINE | Facility: OTHER | Age: 33
End: 2023-05-24

## 2023-05-25 NOTE — TELEPHONE ENCOUNTER
Pt called with worsening symptoms regarding her facial rash.  She was seen by CODEY Landa on 05/12/2023 and started on Lotrimin 1%. Pt reports her rash has worsen and covers her eye lids and other areas of her face.  Pt was offered to see a covering provider today but declined.  Pt preferred to see her PCP.  Pt was encourage to seek medical treatment from UC/ER with any new or worsening symptoms.  Pt verbalized understanding and was scheduled an appointment with PANCHO Landa on May 31st.

## 2023-05-30 NOTE — PROGRESS NOTES
Assessment & Plan     Atopic dermatitis of face  Stop lotrimin (no improvement and irritating)   Limit any product on face  Can try metrogel to rash areas 1-2 times a day   Referral to dermatology - has been seen by [facility] in the past   - metroNIDAZOLE (METROGEL) 0.75 % external gel; Apply topically 2 times daily  - Adult Dermatology Referral; Future       See Patient Instructions    No follow-ups on file.    Danii Landa, DAMON [facility] - LUZ ELENA [place]   Jessy is a 32 year old, presenting for the following health issues:  Derm Problem (Rash on face)         View : No data to display.              HPI     Rash  Onset/Duration: 2 months  Description  Location: eyelid and face  Character: flakey, burning, red  Itching: itches when anything is put on it  Intensity:  severe  Progression of Symptoms:  same  Accompanying signs and symptoms:   Fever: No  Body aches or joint pain: No  Sore throat symptoms: No  Recent cold symptoms: No  History:           Previous episodes of similar rash: yes  New exposures:  None  Recent travel: No  Exposure to similar rash: YES  Precipitating or alleviating factors: none  Therapies tried and outcome: Lotrimin - worsening         Review of Systems   CONSTITUTIONAL: NEGATIVE for fever, chills, change in weight  INTEGUMENTARY/SKIN: dry peeling skin around mouth and on eye lids   RESP: NEGATIVE for significant cough or SOB  CV: NEGATIVE for chest pain, palpitations or peripheral edema  ENDOCRINE: Hx thyroid disease      Objective    /70   Pulse 70   Temp 98.7  F (37.1  C) (Tympanic)   Wt 97.5 kg (215 lb)   LMP 03/23/2023 (Exact Date)   SpO2 98%   BMI 34.70 kg/m    Body mass index is 34.7 kg/m .  Physical Exam   GENERAL: alert and no distress  RESP: regular non-labored   SKIN: erythema peeling raw skin to bilateral eye lids and erythema patch with peeling skin around mouth

## 2023-05-31 ENCOUNTER — OFFICE VISIT (OUTPATIENT)
Dept: FAMILY MEDICINE | Facility: OTHER | Age: 33
End: 2023-05-31
Attending: NURSE PRACTITIONER
Payer: COMMERCIAL

## 2023-05-31 VITALS
OXYGEN SATURATION: 98 % | BODY MASS INDEX: 34.7 KG/M2 | WEIGHT: 215 LBS | TEMPERATURE: 98.7 F | HEART RATE: 70 BPM | DIASTOLIC BLOOD PRESSURE: 70 MMHG | SYSTOLIC BLOOD PRESSURE: 112 MMHG

## 2023-05-31 DIAGNOSIS — L20.9 ATOPIC DERMATITIS OF FACE: Primary | ICD-10-CM

## 2023-05-31 PROCEDURE — 99213 OFFICE O/P EST LOW 20 MIN: CPT | Performed by: NURSE PRACTITIONER

## 2023-05-31 RX ORDER — METRONIDAZOLE 7.5 MG/G
GEL TOPICAL 2 TIMES DAILY
Qty: 45 G | Refills: 0 | Status: SHIPPED | OUTPATIENT
Start: 2023-05-31

## 2023-05-31 ASSESSMENT — PAIN SCALES - GENERAL: PAINLEVEL: NO PAIN (0)

## 2023-05-31 NOTE — PATIENT INSTRUCTIONS
Limited lotion and cream to face  Trial of metrogel to face 1-2 times a day     Can try vanicream with sunscreen

## 2023-09-12 ENCOUNTER — MEDICAL CORRESPONDENCE (OUTPATIENT)
Dept: HEALTH INFORMATION MANAGEMENT | Facility: HOSPITAL | Age: 33
End: 2023-09-12

## 2023-11-27 ENCOUNTER — OFFICE VISIT (OUTPATIENT)
Dept: FAMILY MEDICINE | Facility: OTHER | Age: 33
End: 2023-11-27
Attending: NURSE PRACTITIONER
Payer: COMMERCIAL

## 2023-11-27 VITALS
WEIGHT: 212.2 LBS | OXYGEN SATURATION: 97 % | TEMPERATURE: 98.3 F | SYSTOLIC BLOOD PRESSURE: 119 MMHG | BODY MASS INDEX: 34.25 KG/M2 | HEART RATE: 79 BPM | DIASTOLIC BLOOD PRESSURE: 81 MMHG

## 2023-11-27 DIAGNOSIS — J06.9 VIRAL URI WITH COUGH: Primary | ICD-10-CM

## 2023-11-27 LAB
FLUAV RNA SPEC QL NAA+PROBE: NEGATIVE
FLUBV RNA RESP QL NAA+PROBE: NEGATIVE
RSV RNA SPEC NAA+PROBE: NEGATIVE
SARS-COV-2 RNA RESP QL NAA+PROBE: NEGATIVE

## 2023-11-27 PROCEDURE — 87637 SARSCOV2&INF A&B&RSV AMP PRB: CPT | Performed by: NURSE PRACTITIONER

## 2023-11-27 PROCEDURE — 99213 OFFICE O/P EST LOW 20 MIN: CPT | Performed by: NURSE PRACTITIONER

## 2023-11-27 RX ORDER — PREDNISONE 20 MG/1
20 TABLET ORAL 2 TIMES DAILY
Qty: 10 TABLET | Refills: 0 | Status: SHIPPED | OUTPATIENT
Start: 2023-11-27 | End: 2023-12-02

## 2023-11-27 ASSESSMENT — PAIN SCALES - GENERAL: PAINLEVEL: NO PAIN (0)

## 2023-11-27 NOTE — PROGRESS NOTES
"  Assessment & Plan     Viral URI with cough  Negative for COVID, influenza and RSV  With her symptoms continue with self sinus and viral care.  She has fluid behind her TMs and some sinus swelling.  Sent in prednisone for swelling. If sinus pressure remains next week consider adding antibiotic   - Symptomatic Influenza A/B, RSV, & SARS-CoV2 PCR (COVID-19); Future  - Symptomatic Influenza A/B, RSV, & SARS-CoV2 PCR (COVID-19) Nasopharyngeal    Ordering of each unique test       BMI:   Estimated body mass index is 34.25 kg/m  as calculated from the following:    Height as of 5/12/23: 1.676 m (5' 6\").    Weight as of this encounter: 96.3 kg (212 lb 3.2 oz).   Weight management plan: Discussed healthy diet and exercise guidelines    See Patient Instructions    No follow-ups on file.    DAMON Soler [facility] - LUZ ELENA Torres   Jessy is a 33 year old, presenting for the following health issues:  Sinus Problem, Cough, and Otalgia        11/27/2023    12:46 PM   Additional Questions   Roomed by Prince Santiago   Accompanied by None         11/27/2023    12:46 PM   Patient Reported Additional Medications   Patient reports taking the following new medications Cough and cold medication (OTC)       HPI       Acute Illness  Acute illness concerns: Cough, sinus pain and ear pain  Onset/Duration: 11/24/2023  Negative home covid test yesterday   Symptoms:  Fever: No  Chills/Sweats: YES  Headache (location?): YES  Sinus Pressure: YES  Conjunctivitis:  No  Ear Pain: YES: bilateral  Rhinorrhea: No  Congestion: YES  Sore Throat: No  Cough: YES-productive of green sputum  Wheeze: No  Decreased Appetite: YES  Nausea: YES  Vomiting: No  Diarrhea: No  Dysuria/Freq.: No  Dysuria or Hematuria: No  Fatigue/Achiness: YES- Fatigued   Sick/Strep Exposure: No  Therapies tried and outcome: Cough and cold medication (OTC)      Review of Systems   CONSTITUTIONAL:chills, fatigue, and fever 100.3 "   INTEGUMENTARY/SKIN: NEGATIVE for worrisome rashes, moles or lesions  EYES: NEGATIVE for vision changes or irritation  ENT/MOUTH: ear pain bilateral, nasal congestion, postnasal drainage, and sinus pressure  RESP:productive cough   CV: NEGATIVE for chest pain, palpitations or peripheral edema  GI: nausea  : denies dysuria   MUSCULOSKELETAL: NEGATIVE for significant arthralgias or myalgia  NEURO: fatigued and headaches   ENDOCRINE: Hx thyroid disease  PSYCHIATRIC: NEGATIVE for changes in mood or affect      Objective    /81 (BP Location: Right arm, Patient Position: Sitting, Cuff Size: Adult Large)   Pulse 79   Temp 98.3  F (36.8  C) (Tympanic)   Wt 96.3 kg (212 lb 3.2 oz)   LMP 11/13/2023 (Within Days)   SpO2 97%   BMI 34.25 kg/m    Body mass index is 34.25 kg/m .  Physical Exam   GENERAL: alert and no distress  HENT: normal cephalic/atraumatic, ear canals and TM's normal, nose and mouth without ulcers or lesions, oropharynx clear, oral mucous membranes moist, and sinuses: maxillary, frontal tenderness on both sides  RESP: lungs clear to auscultation - no rales, rhonchi or wheezes and occasional cough   CV: regular rate and rhythm, normal S1 S2, no S3 or S4, no murmur, click or rub, no peripheral edema and peripheral pulses strong  ABDOMEN: soft, nontender, no hepatosplenomegaly, no masses and bowel sounds normal  PSYCH: mentation appears normal, affect normal/bright    Results for orders placed or performed in visit on 11/27/23   Symptomatic Influenza A/B, RSV, & SARS-CoV2 PCR (COVID-19) Nasopharyngeal     Status: Normal    Specimen: Nasopharyngeal; Swab   Result Value Ref Range    Influenza A PCR Negative Negative    Influenza B PCR Negative Negative    RSV PCR Negative Negative    SARS CoV2 PCR Negative Negative    Narrative    Testing was performed using the Xpert Xpress CoV2/Flu/RSV Assay on the [employer] Instrument. This test should be ordered for the detection of SARS-CoV-2, influenza,  and RSV viruses in individuals who meet clinical and/or epidemiological criteria. Test performance is unknown in asymptomatic patients. This test is for in vitro diagnostic use under the FDA EUA for laboratories certified under CLIA to perform high or moderate complexity testing. This test has not been FDA cleared or approved. A negative result does not rule out the presence of PCR inhibitors in the specimen or target RNA in concentration below the limit of detection for the assay. If only one viral target is positive but coinfection with multiple targets is suspected, the sample should be re-tested with another FDA cleared, approved, or authorized test, if coinfection would change clinical management. This test was validated by the [email or web address]. These laboratories are certified under the Clinical Laboratory Improvement Amendments of 1988 (CLIA-88) as qualified to perform high complexity laboratory testing.

## 2023-12-01 DIAGNOSIS — J01.90 ACUTE SINUSITIS WITH SYMPTOMS > 10 DAYS: Primary | ICD-10-CM

## 2023-12-01 RX ORDER — DOXYCYCLINE 100 MG/1
100 CAPSULE ORAL 2 TIMES DAILY
Qty: 20 CAPSULE | Refills: 0 | Status: SHIPPED | OUTPATIENT
Start: 2023-12-01

## 2024-06-20 ENCOUNTER — TELEPHONE (OUTPATIENT)
Dept: FAMILY MEDICINE | Facility: OTHER | Age: 34
End: 2024-06-20

## 2024-06-20 NOTE — TELEPHONE ENCOUNTER
----- Message from Crissy ARVIZU sent at 6/20/2024 11:13 AM CDT -----  Regarding: Please call to schedule 2024 Physical  For What: 2024 Physical- due as of 5/12/2024.     With: Danii Landa     For When: Per PCP's schedule availability and patient is agreeable.    Thank you,  MATTHIAS Woodward.   [facility] Quality Department

## 2024-07-06 ENCOUNTER — HEALTH MAINTENANCE LETTER (OUTPATIENT)
Age: 34
End: 2024-07-06

## 2024-07-12 SDOH — HEALTH STABILITY: PHYSICAL HEALTH: ON AVERAGE, HOW MANY MINUTES DO YOU ENGAGE IN EXERCISE AT THIS LEVEL?: 150+ MIN

## 2024-07-12 SDOH — HEALTH STABILITY: PHYSICAL HEALTH: ON AVERAGE, HOW MANY DAYS PER WEEK DO YOU ENGAGE IN MODERATE TO STRENUOUS EXERCISE (LIKE A BRISK WALK)?: 5 DAYS

## 2024-07-12 ASSESSMENT — SOCIAL DETERMINANTS OF HEALTH (SDOH): HOW OFTEN DO YOU GET TOGETHER WITH FRIENDS OR RELATIVES?: THREE TIMES A WEEK

## 2024-07-17 ENCOUNTER — OFFICE VISIT (OUTPATIENT)
Dept: FAMILY MEDICINE | Facility: OTHER | Age: 34
End: 2024-07-17
Attending: NURSE PRACTITIONER
Payer: COMMERCIAL

## 2024-07-17 VITALS
HEIGHT: 66 IN | TEMPERATURE: 97.7 F | HEART RATE: 67 BPM | WEIGHT: 212.2 LBS | DIASTOLIC BLOOD PRESSURE: 78 MMHG | OXYGEN SATURATION: 97 % | BODY MASS INDEX: 34.1 KG/M2 | SYSTOLIC BLOOD PRESSURE: 118 MMHG

## 2024-07-17 DIAGNOSIS — Z13.220 LIPID SCREENING: ICD-10-CM

## 2024-07-17 DIAGNOSIS — Z80.3 FAMILY HISTORY OF MALIGNANT NEOPLASM OF BREAST: ICD-10-CM

## 2024-07-17 DIAGNOSIS — N92.6 ABNORMAL MENSTRUAL CYCLE: ICD-10-CM

## 2024-07-17 DIAGNOSIS — Z00.00 ROUTINE GENERAL MEDICAL EXAMINATION AT A HEALTH CARE FACILITY: Primary | ICD-10-CM

## 2024-07-17 DIAGNOSIS — E06.3 HYPOTHYROIDISM DUE TO HASHIMOTO'S THYROIDITIS: ICD-10-CM

## 2024-07-17 LAB
ALBUMIN SERPL BCG-MCNC: 4.5 G/DL (ref 3.5–5.2)
ALP SERPL-CCNC: 81 U/L (ref 40–150)
ALT SERPL W P-5'-P-CCNC: 17 U/L (ref 0–50)
ANION GAP SERPL CALCULATED.3IONS-SCNC: 9 MMOL/L (ref 7–15)
AST SERPL W P-5'-P-CCNC: 18 U/L (ref 0–45)
BILIRUB SERPL-MCNC: 0.3 MG/DL
BUN SERPL-MCNC: 12.1 MG/DL (ref 6–20)
CALCIUM SERPL-MCNC: 9 MG/DL (ref 8.8–10.4)
CHLORIDE SERPL-SCNC: 101 MMOL/L (ref 98–107)
CHOLEST SERPL-MCNC: 213 MG/DL
CREAT SERPL-MCNC: 0.79 MG/DL (ref 0.51–0.95)
EGFRCR SERPLBLD CKD-EPI 2021: >90 ML/MIN/1.73M2
FASTING STATUS PATIENT QL REPORTED: YES
FASTING STATUS PATIENT QL REPORTED: YES
GLUCOSE SERPL-MCNC: 92 MG/DL (ref 70–99)
HCO3 SERPL-SCNC: 27 MMOL/L (ref 22–29)
HDLC SERPL-MCNC: 55 MG/DL
LDLC SERPL CALC-MCNC: 123 MG/DL
NONHDLC SERPL-MCNC: 158 MG/DL
POTASSIUM SERPL-SCNC: 4.3 MMOL/L (ref 3.4–5.3)
PROT SERPL-MCNC: 8.1 G/DL (ref 6.4–8.3)
SODIUM SERPL-SCNC: 137 MMOL/L (ref 135–145)
TRIGL SERPL-MCNC: 174 MG/DL

## 2024-07-17 PROCEDURE — 36415 COLL VENOUS BLD VENIPUNCTURE: CPT | Performed by: NURSE PRACTITIONER

## 2024-07-17 PROCEDURE — 80061 LIPID PANEL: CPT | Performed by: NURSE PRACTITIONER

## 2024-07-17 PROCEDURE — 99213 OFFICE O/P EST LOW 20 MIN: CPT | Mod: 25 | Performed by: NURSE PRACTITIONER

## 2024-07-17 PROCEDURE — 99395 PREV VISIT EST AGE 18-39: CPT | Performed by: NURSE PRACTITIONER

## 2024-07-17 PROCEDURE — 80053 COMPREHEN METABOLIC PANEL: CPT | Performed by: NURSE PRACTITIONER

## 2024-07-17 ASSESSMENT — PAIN SCALES - GENERAL: PAINLEVEL: NO PAIN (0)

## 2024-07-17 NOTE — PATIENT INSTRUCTIONS
Patient Education   Preventive Care Advice   This is general advice given by our system to help you stay healthy. However, your care team may have specific advice just for you. Please talk to your care team about your preventive care needs.  Nutrition  Eat 5 or more servings of fruits and vegetables each day.  Try wheat bread, brown rice and whole grain pasta (instead of white bread, rice, and pasta).  Get enough calcium and vitamin D. Check the label on foods and aim for 100% of the RDA (recommended daily allowance).  Lifestyle  Exercise at least 150 minutes each week  (30 minutes a day, 5 days a week).  Do muscle strengthening activities 2 days a week. These help control your weight and prevent disease.  No smoking.  Wear sunscreen to prevent skin cancer.  Have a dental exam and cleaning every 6 months.  Yearly exams  See your health care team every year to talk about:  Any changes in your health.  Any medicines your care team has prescribed.  Preventive care, family planning, and ways to prevent chronic diseases.  Shots (vaccines)   HPV shots (up to age 26), if you've never had them before.  Hepatitis B shots (up to age 59), if you've never had them before.  COVID-19 shot: Get this shot when it's due.  Flu shot: Get a flu shot every year.  Tetanus shot: Get a tetanus shot every 10 years.  Pneumococcal, hepatitis A, and RSV shots: Ask your care team if you need these based on your risk.  Shingles shot (for age 50 and up)  General health tests  Diabetes screening:  Starting at age 35, Get screened for diabetes at least every 3 years.  If you are younger than age 35, ask your care team if you should be screened for diabetes.  Cholesterol test: At age 39, start having a cholesterol test every 5 years, or more often if advised.  Bone density scan (DEXA): At age 50, ask your care team if you should have this scan for osteoporosis (brittle bones).  Hepatitis C: Get tested at least once in your life.  STIs (sexually  transmitted infections)  Before age 24: Ask your care team if you should be screened for STIs.  After age 24: Get screened for STIs if you're at risk. You are at risk for STIs (including HIV) if:  You are sexually active with more than one person.  You don't use condoms every time.  You or a partner was diagnosed with a sexually transmitted infection.  If you are at risk for HIV, ask about PrEP medicine to prevent HIV.  Get tested for HIV at least once in your life, whether you are at risk for HIV or not.  Cancer screening tests  Cervical cancer screening: If you have a cervix, begin getting regular cervical cancer screening tests starting at age 21.  Breast cancer scan (mammogram): If you've ever had breasts, begin having regular mammograms starting at age 40. This is a scan to check for breast cancer.  Colon cancer screening: It is important to start screening for colon cancer at age 45.  Have a colonoscopy test every 10 years (or more often if you're at risk) Or, ask your provider about stool tests like a FIT test every year or Cologuard test every 3 years.  To learn more about your testing options, visit:   .  For help making a decision, visit:   https://bit.ly/oh23872.  Prostate cancer screening test: If you have a prostate, ask your care team if a prostate cancer screening test (PSA) at age 55 is right for you.  Lung cancer screening: If you are a current or former smoker ages 50 to 80, ask your care team if ongoing lung cancer screenings are right for you.  For informational purposes only. Not to replace the advice of your health care provider. Copyright   2023 [employer]. All rights reserved. Clinically reviewed by the [facility] Transitions Program. Mozes [id number] - REV 01/24.

## 2024-07-17 NOTE — PROGRESS NOTES
"Preventive Care Visit  [facility]  DAMON Soler CNP, Family Medicine  Jul 17, 2024      Assessment & Plan     Routine general medical examination at a health care facility  Continue yearly physicals   - Comprehensive metabolic panel (BMP + Alb, Alk Phos, ALT, AST, Total. Bili, TP); Future  - Comprehensive metabolic panel (BMP + Alb, Alk Phos, ALT, AST, Total. Bili, TP)    Hypothyroidism due to Hashimoto's thyroiditis  Continue to follow up with endocrine.   Last TSH normal     Abnormal menstrual cycle  Continue to have abnormal menstrual cycle   Concerns for PCOS   Referral to OB[facility]GYN at [facility]   - Ob/Gyn  Referral    Family history of malignant neoplasm of breast  Strong family history of breast cancer   Referral for genetic counseling   - Adult Oncology/Hematology  Referral; Future    Lipid screening  Cholesterol elevated  Discussed lifestyle changes - try increased fiber plant based diet   - Lipid Profile (Chol, Trig, HDL, LDL calc); Future  - Lipid Profile (Chol, Trig, HDL, LDL calc)        Patient has been advised of split billing requirements and indicates understanding: Yes        BMI  Estimated body mass index is 33.94 kg/m  as calculated from the following:    Height as of this encounter: 1.684 m (5' 6.3\").    Weight as of this encounter: 96.3 kg (212 lb 3.2 oz).   Weight management plan: Discussed healthy diet and exercise guidelines    Counseling  Appropriate preventive services were addressed with this patient via screening, questionnaire, or discussion as appropriate for fall prevention, nutrition, physical activity, Tobacco-use cessation, weight loss and cognition.  Checklist reviewing preventive services available has been given to the patient.  Reviewed patient's diet, addressing concerns and/or questions.   She is at risk for psychosocial distress and has been provided with information to reduce risk.       See Patient Instructions    Return in about 53 " weeks (around 7/23/2025) for Annual Wellness Visit.    Melissa Jiménez is a 33 year old, presenting for the following:  Physical, Thyroid Problem, and Abnormal Bleeding Problem        7/17/2024     9:03 AM   Additional Questions   Roomed by ang poole   Accompanied by self         7/17/2024     9:03 AM   Patient Reported Additional Medications   Patient reports taking the following new medications none        Health Care Directive  Patient does not have a Health Care Directive or Living Will: Discussed advance care planning with patient; however, patient declined at this time.    HPI    Hypothyroidism Follow-up  -Discuss irregular periods   Spotty or heavy and can also regular or long periods without one   Since last visit, patient describes the following symptoms: fatigue            7/12/2024   General Health   How would you rate your overall physical health? (!) FAIR   Feel stress (tense, anxious, or unable to sleep) Only a little      (!) STRESS CONCERN      7/12/2024   Nutrition   Three or more servings of calcium each day? Yes   Diet: Gluten-free/reduced   How many servings of fruit and vegetables per day? (!) 2-3   How many sweetened beverages each day? 0-1            7/12/2024   Exercise   Days per week of moderate/strenous exercise 5 days   Average minutes spent exercising at this level 150+ min            7/12/2024   Social Factors   Frequency of gathering with friends or relatives Three times a week   Worry food won't last until get money to buy more No   Food not last or not have enough money for food? No   Do you have housing? (Housing is defined as stable permanent housing and does not include staying ouside in a car, in a tent, in an abandoned building, in an overnight shelter, or couch-surfing.) No   Are you worried about losing your housing? No   Lack of transportation? No   Unable to get utilities (heat,electricity)? No   Want help with housing or utility concern? No      (!) HOUSING CONCERN  PRESENT      7/12/2024   Dental   Dentist two times every year? Yes               Today's PHQ-2 Score:       7/16/2024     9:44 AM   PHQ-2 ( 1999 Pf[employer])   Q1: Little interest or pleasure in doing things 0   Q2: Feeling down, depressed or hopeless 0   PHQ-2 Score 0   Q1: Little interest or pleasure in doing things Not at all   Q2: Feeling down, depressed or hopeless Not at all   PHQ-2 Score 0           7/12/2024   Substance Use   Alcohol more than 3/day or more than 7/wk No   Do you use any other substances recreationally? No        Social History     Tobacco Use    Smoking status: Never     Passive exposure: Never    Smokeless tobacco: Never   Vaping Use    Vaping status: Never Used   Substance Use Topics    Alcohol use: Yes     Comment: Very minimal    Drug use: No           5/9/2023   LAST FHS-7 RESULTS   1st degree relative breast or ovarian cancer Yes   Any relative bilateral breast cancer Unknown   Any male have breast cancer No   Any ONE woman have BOTH breast AND ovarian cancer Yes   Any woman with breast cancer before 50yrs Unknown   2 or more relatives with breast AND/OR ovarian cancer Yes   2 or more relatives with breast AND/OR bowel cancer No           Family history of breast cancer - referral for genetic counseling           7/12/2024   One time HIV Screening   Previous HIV test? Yes          7/12/2024   STI Screening   New sexual partner(s) since last STI/HIV test? No        History of abnormal Pap smear:         Latest Ref Rng & Units 5/12/2023    10:29 AM 2/5/2020     1:03 PM 6/17/2016    12:00 AM   PAP / HPV   PAP  Negative for Intraepithelial Lesion or Malignancy (NILM)      PAP (Historical)   NIL  NIL    HPV 16 DNA Negative Negative      HPV 18 DNA Negative Negative      Other HR HPV Negative Negative              7/12/2024   Contraception/Family Planning   Questions about contraception or family planning No           Reviewed and updated as needed this visit by Provider                    Labs  reviewed in EPIC  BP Readings from Last 3 Encounters:   07/17/24 118/78   11/27/23 119/81   05/31/23 112/70    Wt Readings from Last 3 Encounters:   07/17/24 96.3 kg (212 lb 3.2 oz)   11/27/23 96.3 kg (212 lb 3.2 oz)   05/31/23 97.5 kg (215 lb)                  Patient Active Problem List   Diagnosis    Swelling, mass, or lump in head and neck    Left knee injury    ACP (advance care planning)    Female infertility    Hypothyroidism due to Hashimoto's thyroiditis    Vitamin D insufficiency     Past Surgical History:   Procedure Laterality Date    BIOPSY      CARDIAC SURGERY      tachacardia in the past    SOFT TISSUE SURGERY      wisdom teeth extraction       Social History     Tobacco Use    Smoking status: Never     Passive exposure: Never    Smokeless tobacco: Never   Substance Use Topics    Alcohol use: Yes     Comment: Very minimal     Family History   Problem Relation Age of Onset    Cerebrovascular Disease Maternal Grandmother         CVA    Diabetes Maternal Grandmother     Breast Cancer Maternal Grandmother     Thyroid Disease Maternal Grandmother     Coronary Artery Disease Maternal Grandmother     Hypertension Father     Diabetes Father     Hyperlipidemia Mother     Depression Mother     Obesity Mother     Hypertension Mother     Thyroid Disease Mother     Anxiety Disorder Mother     Anesthesia Reaction Mother     Other Cancer Paternal Grandmother     Colon Cancer Paternal Grandmother     Prostate Cancer No family hx of     Mental Illness No family hx of     Substance Abuse No family hx of     Asthma No family hx of     Osteoporosis No family hx of     Genetic Disorder No family hx of          Current Outpatient Medications   Medication Sig Dispense Refill    Cholecalciferol (VITAMIN D3) 2000 units CAPS Take 2,000 Units by mouth daily      clotrimazole (LOTRIMIN) 1 % external cream Apply topically 2 times daily 28 g 0    doxycycline hyclate (VIBRAMYCIN) 100 MG capsule Take 1 capsule (100 mg) by mouth 2  "times daily 20 capsule 0    levothyroxine (SYNTHROID/LEVOTHROID) 125 MCG tablet Take 1 tablet by mouth daily      metroNIDAZOLE (METROGEL) 0.75 % external gel Apply topically 2 times daily 45 g 0    Prenatal Vit-Fe Fumarate-FA (PRENATAL 19) CHEW Take 1 chew tab by mouth daily       Allergies   Allergen Reactions    Chlorine Shortness Of Breath and Rash    Amoxicillin Rash         Review of Systems  CONSTITUTIONAL: NEGATIVE for fever, chills, change in weight  INTEGUMENTARY/SKIN: NEGATIVE for worrisome rashes, moles or lesions  EYES: NEGATIVE for vision changes or irritation  ENT/MOUTH: NEGATIVE for ear, mouth and throat problems  RESP: NEGATIVE for significant cough or SOB  CV: NEGATIVE for chest pain, palpitations or peripheral edema  GI: NEGATIVE for nausea, abdominal pain, heartburn, or change in bowel habits  : abnormal menstrual cycles and denies dysuria   MUSCULOSKELETAL: NEGATIVE for significant arthralgias or myalgia  NEURO: NEGATIVE for weakness, dizziness or paresthesias  ENDOCRINE: Hx thyroid disease  PSYCHIATRIC: NEGATIVE for changes in mood or affect     Objective    Exam  /78 (BP Location: Right arm, Patient Position: Sitting, Cuff Size: Adult Regular)   Pulse 67   Temp 97.7  F (36.5  C) (Tympanic)   Ht 1.684 m (5' 6.3\")   Wt 96.3 kg (212 lb 3.2 oz)   LMP 03/06/2024   SpO2 97%   BMI 33.94 kg/m     Estimated body mass index is 33.94 kg/m  as calculated from the following:    Height as of this encounter: 1.684 m (5' 6.3\").    Weight as of this encounter: 96.3 kg (212 lb 3.2 oz).    Physical Exam  GENERAL: alert and no distress  EYES: Eyes grossly normal to inspection, PERRL and conjunctivae and sclerae normal  HENT: ear canals and TM's normal, nose and mouth without ulcers or lesions  NECK: no adenopathy, no asymmetry, masses, or scars  RESP: lungs clear to auscultation - no rales, rhonchi or wheezes  CV: regular rate and rhythm, normal S1 S2, no S3 or S4, no murmur, click or rub, no " peripheral edema  ABDOMEN: soft, nontender, no hepatosplenomegaly, no masses and bowel sounds normal  MS: no gross musculoskeletal defects noted, no edema  SKIN: no suspicious lesions or rashes  NEURO: Normal strength and tone, mentation intact and speech normal  PSYCH: mentation appears normal, affect normal/bright  LYMPH: no cervical, supraclavicular, axillary, or inguinal adenopathy        Signed Electronically by: DAMON Soler CNP

## 2024-07-18 ENCOUNTER — PATIENT OUTREACH (OUTPATIENT)
Dept: ONCOLOGY | Facility: CLINIC | Age: 34
End: 2024-07-18

## 2024-07-18 NOTE — PROGRESS NOTES
Writer received referral to Cancer Risk Management/Genetic Counseling.    Referred for: Genetic Counseling; strong family history of breast cancer      Referred by:   Provider Department Location Phone   Danii Landa APRN CNP [facility] [phone]     Referral reviewed for appropriate plan and sent to New Patient Scheduling ([phone]) for completion.

## 2024-08-16 ENCOUNTER — OFFICE VISIT (OUTPATIENT)
Dept: FAMILY MEDICINE | Facility: OTHER | Age: 34
End: 2024-08-16
Attending: FAMILY MEDICINE
Payer: COMMERCIAL

## 2024-08-16 VITALS
BODY MASS INDEX: 34.07 KG/M2 | RESPIRATION RATE: 16 BRPM | HEIGHT: 66 IN | HEART RATE: 74 BPM | SYSTOLIC BLOOD PRESSURE: 104 MMHG | DIASTOLIC BLOOD PRESSURE: 76 MMHG | OXYGEN SATURATION: 98 % | TEMPERATURE: 97.4 F | WEIGHT: 212 LBS

## 2024-08-16 DIAGNOSIS — H92.01 EAR PAIN, RIGHT: ICD-10-CM

## 2024-08-16 DIAGNOSIS — J02.9 SORE THROAT: Primary | ICD-10-CM

## 2024-08-16 PROCEDURE — 87637 SARSCOV2&INF A&B&RSV AMP PRB: CPT | Performed by: FAMILY MEDICINE

## 2024-08-16 PROCEDURE — 99213 OFFICE O/P EST LOW 20 MIN: CPT | Performed by: FAMILY MEDICINE

## 2024-08-16 ASSESSMENT — PAIN SCALES - GENERAL: PAINLEVEL: MILD PAIN (3)

## 2024-08-16 NOTE — PATIENT INSTRUCTIONS
Will notify of multiplex swab results.  Ear looks great!   Symptomatic cares - fluids, rest, Tylenol/ibuprofen, gargling, etc.  Reassess if not improving.  Have a good day!

## 2024-08-16 NOTE — PROGRESS NOTES
Assessment & Plan     Sore throat  Suspect viral URI.  Multiplex pending.  Symptomatic cares discussed.  Reassess if not improving.  - Symptomatic Influenza A/B, RSV, & SARS-CoV2 PCR (COVID-19); Future    Ear pain, right  Normal ear exam.  Mild tenderness in right jaw - possibility of TMJ/clenching.  Symptomatic cares discussed.  - Symptomatic Influenza A/B, RSV, & SARS-CoV2 PCR (COVID-19); Future            See Patient Instructions    Return if symptoms worsen or fail to improve.    Subjective   Jessy is a 34 year old, presenting for the following health issues:  Ear Problem (Right Ear) and Pharyngitis        8/16/2024    10:24 AM   Additional Questions   Roomed by Adolfo Valencia LPN   Accompanied by Self         8/16/2024    10:24 AM   Patient Reported Additional Medications   Patient reports taking the following new medications Self     History of Present Illness       Reason for visit:  Ear pain  Symptom onset:  1-3 days ago  Symptoms include:  Pain in ear and sore throat  Symptom intensity:  Moderate  Symptom progression:  Worsening  Had these symptoms before:  Yes  Has tried/received treatment for these symptoms:  Yes  Previous treatment was successful:  Yes  Prior treatment description:  Steriod and antibiotic  What makes it worse:  No  What makes it better:  No    She eats 2-3 servings of fruits and vegetables daily.She consumes 1 sweetened beverage(s) daily.She exercises with enough effort to increase her heart rate 30 to 60 minutes per day.  She exercises with enough effort to increase her heart rate 5 days per week.   She is taking medications regularly.       Acute Illness  Acute illness concerns: Right ear pain, sore throat  Onset/Duration: 08/15/2024  Symptoms:  Fever: No  Chills/Sweats: YES- Chills  Headache (location?): YES- off and on  Sinus Pressure: No  Conjunctivitis:  No  Ear Pain: YES: right  Rhinorrhea: No  Congestion: No  Sore Throat: YES  Cough: no  Wheeze: No  Decreased Appetite:  "YES  Nausea: YES  Vomiting: No  Diarrhea: No  Dysuria/Freq.: No  Dysuria or Hematuria: No  Fatigue/Achiness: YES- fatigue  Sick/Strep Exposure: YES- Airport  Therapies tried and outcome: Ibuprofen      No recent antibiotics.  COVID booster due.      Review of Systems  Constitutional, HEENT, cardiovascular, pulmonary, gi and gu systems are negative, except as otherwise noted.      Objective    /76   Pulse 74   Temp 97.4  F (36.3  C) (Tympanic)   Resp 16   Ht 1.684 m (5' 6.3\")   Wt 96.2 kg (212 lb)   LMP 03/06/2024   SpO2 98%   BMI 33.91 kg/m    Body mass index is 33.91 kg/m .  Physical Exam   GENERAL: alert and no distress  EYES: Eyes grossly normal to inspection, PERRL and conjunctivae and sclerae normal  HENT: ear canals and TM's normal, nose and mouth without ulcers or lesions  NECK: no adenopathy, no asymmetry, masses, or scars  RESP: lungs clear to auscultation - no rales, rhonchi or wheezes  CV: regular rate and rhythm, normal S1 S2, no S3 or S4, no murmur, click or rub, no peripheral edema  MS: no gross musculoskeletal defects noted, no edema  PSYCH: mentation appears normal, affect normal/bright    Multiplex pending        Signed Electronically by: Krissy Polanoc MD    "

## 2025-01-09 ENCOUNTER — APPOINTMENT (OUTPATIENT)
Dept: LAB | Facility: OTHER | Age: 35
End: 2025-01-09
Payer: COMMERCIAL

## 2025-01-09 PROCEDURE — 36415 COLL VENOUS BLD VENIPUNCTURE: CPT | Performed by: FAMILY MEDICINE

## 2025-01-20 DIAGNOSIS — Z84.81 FAMILY HISTORY OF GENE MUTATION: ICD-10-CM

## 2025-01-20 DIAGNOSIS — Z80.3 FAMILY HISTORY OF MALIGNANT NEOPLASM OF BREAST: Primary | ICD-10-CM

## 2025-01-20 DIAGNOSIS — Z80.0 FAMILY HISTORY OF COLON CANCER: ICD-10-CM

## 2025-01-22 ENCOUNTER — VIRTUAL VISIT (OUTPATIENT)
Dept: ONCOLOGY | Facility: CLINIC | Age: 35
End: 2025-01-22
Attending: GENETIC COUNSELOR, MS
Payer: COMMERCIAL

## 2025-01-22 DIAGNOSIS — Z80.0 FAMILY HISTORY OF COLON CANCER: ICD-10-CM

## 2025-01-22 DIAGNOSIS — Z84.81 FAMILY HISTORY OF GENE MUTATION: ICD-10-CM

## 2025-01-22 DIAGNOSIS — Z80.3 FAMILY HISTORY OF MALIGNANT NEOPLASM OF BREAST: ICD-10-CM

## 2025-01-22 PROCEDURE — 96041 GENETIC COUNSELING SVC EA 30: CPT | Mod: GT,95 | Performed by: GENETIC COUNSELOR, MS

## 2025-01-22 NOTE — PROGRESS NOTES
"1/22/2025    Virtual Visit Details  Type of service:  Video Visit   Originating Location (pt. Location): Other - office  Distant Location (provider location):  Off-site  Platform used for Video Visit: [facility]    Referring Provider: DAMON Aden CNP    Presenting Information:  I spoke to Jessy by video today to discuss her genetic testing results. We last met on 1/9/2025 and her blood was drawn on 1/9/2025. The [employer] Common Hereditary Cancers Panel was ordered from [facility]. This testing was done because of Jessy's family history of cancer and BRCA2 gene mutation.    Genetic Testing Result: NEGATIVE  Jessy is negative for mutations in the APC, EDITH, AXIN2, BAP1, BARD1, BMPR1A, BRCA1, BRCA2, BRIP1, CDH1, CDK4, CDKN2A, CHEK2, CTNNA1, DICER1, EPCAM, FH, GREM1, HOXB13, KIT, MBD4, MEN1, MLH1, MSH2, MSH3, MSH6, MUTYH, NF1, NTHL1, PALB2, PDGFRA, PMS2, POLD1, POLE, PTEN, RAD51C, RAD51D, SDHA, SDHB, SDHC, SDHD, SMAD4, SMARCA4, STK11, TP53, TSC1, TSC2, and VHL genes.      No mutations were found in any of the 48 genes analyzed. This test involved sequencing and deletion/duplication analysis of all genes with the exceptions of EPCAM and GREM1 (deletions/duplications only) and SDHA (sequencing only).      A copy of the test report can be found in the Laboratory tab, dated 1/9/2025, and named \"LABORATORY MISCELLANEOUS RESULT.\" The report is scanned in as a linked document.    Interpretation:  We discussed several different interpretations of this negative test result.    One explanation may be that her relatives with cancer did have a mutation in one of these 48 genes and Jessy did not inherit it.  We specifically discussed that Jessy was not found to carry a mutation in the BRCA2 gene. This means she is not expected to carry the BRCA2 mutation reportedly identified in her paternal uncle, nor the associated cancer risks.  That being said, we discussed that this interpretation of Jessy's results cannot be confirmed " without a copy of her uncle's genetic testing report. Jessy verbalized understanding and will speak to her uncle about getting a copy of his report.  Another explanation may be that there is a different gene or combination of genes and environment that are associated with some the cancers in this family that are not identifiable using this genetic test. As such, Jessy is encouraged to contact me regularly to review any new genetic testing options that may be appropriate for her.  There is also a small possibility that there is a mutation in one of these genes, and the testing laboratory could not find it with their current testing methods.       Cancer Screening:  Based on this negative test result, it is important for Jessy and her relatives to refer back to the family history for appropriate cancer screening.    Based on her personal and family history, Jessy has a 26% lifetime risk of developing breast cancer based on the MIGUEL 8 model. As such, Jessy meets current National Comprehensive Cancer Network (NCCN) guidelines for high risk breast screening. This includes annual breast MRI in addition to annual mammogram beginning at age 40. In addition, Jessy should be receiving clinical breast exams by her physician. As this screening would not begin for seven more years, Jessy is encouraged to readdress updated breast screening recommendations with her medical providers when she is nearing age 41.  Jessy's close female relatives also remain at increased risk for breast cancer given their family history. Breast cancer screening is generally recommended to begin approximately 10 years younger than the earliest age of breast cancer diagnosis in the family, or at age 40, whichever comes first. In this family, screening may begin at age 40. Breast screening options should be discussed with an individual's primary care provider and a Genetic Counselor, to determine at what age to begin screening, what screening is  appropriate, and if additional screening (such as breast MRI) is necessary based on personal/family history factors.  Other population cancer screening options, such as those recommended by the American Cancer Society and NCCN, are also appropriate for Jessy and her family. These screening recommendations may change if there are changes to Jessy's personal and/or family history of cancer. Final screening recommendations should be made in consultation with each individual's primary care provider.      Inheritance:  We reviewed the autosomal dominant inheritance of mutations in these 48 genes. We discussed that if Jessy has biological children, she cannot pass on an identifiable mutation in these genes to her children based on this test result. Mutations in these genes do not skip generations.      Additional Testing Considerations:  Although Jessy's genetic testing result was negative, other relatives may still carry the familial BRCA2 gene mutation and/or a different gene mutation associated with hereditary cancer. Genetic counseling is recommended for Jessy's mother, extended maternal relatives, brother, father, and extended paternal relatives to discuss their genetic testing options. If any of these relatives do pursue genetic testing, Jessy is encouraged to contact me so that we may review the impact of their test results on her.    Summary:  Jessy was not found to carry a mutation in the BRCA2 gene, nor a mutation in the 47 other genes included in her genetic test. While no genetic changes were identified, Jessy may still be at risk for certain cancers due to family history, environmental factors, or other genetic causes not identified by this test. Because of that, it is important that she continue with cancer screening based on her personal and family history as discussed above.    Genetic testing is rapidly advancing, and new cancer susceptibility genes will most likely be identified in the future. Therefore,  I encouraged Jessy to contact me regularly or if there are changes in her personal or family history. This may change how we assess her cancer risk, screening, and the testing we would offer.    Plan:  1. Jessy was provided a copy of her test results via [facility].  2. She plans to follow-up with her medical providers as discussed above.  3. She should contact me periodically, or if her personal or family history of cancer changes.    I spent 30 minutes on the date of the encounter doing chart review, history and exam, documentation and further activities as noted above.    Leticia Cash MS, [facility] [place]  Licensed, Certified Genetic Counselor  Office: [phone]  [email or web address]

## 2025-01-22 NOTE — PATIENT INSTRUCTIONS
Negative Genetic Test Result    Genetic Testing  Genetic testing involved a blood or saliva test which looked at the genetic information in select genes for variants associated with cancer risk.  This testing may have included analysis of a single gene due to a known variant in the family, multiple genes most associated with the cancers in a family, or an expanded panel of genes related to many types of cancers.     Results  There are several possible genetic test results, including:   Positive--a harmful mutation (also known as a  pathogenic  or  likely pathogenic  variant) was identified in a gene associated with increased cancer risk.  These risks, as well as medical management options, depend on the specific genetic variant identified.    Negative--no variants were identified in the genes analyzed   Variant of unknown significance--a variant was identified in one or more genes, though it is currently unclear how this impacts cancer risk in the family.  Genetic testing labs are working to collect evidence about these uncertain variants and may provide updates in the future.    What Does a Negative Genetic Test Result Mean?  A negative genetic test results means that no genetic changes (variants) were detected in the genes tested. While no inherited risk factors for cancer were identified, you and your family may be at risk for certain cancers due to family history, environmental factors, or other genetic causes not identified by this test.  It is also possible that your family may still be at risk of carrying a genetic risk factor which you did not inherit. Your genetic counselor can help interpret the result for you and your relatives.      It is important to note which genes were included in your test. A list of these genes can be found on your test result.    Screening Recommendations  A combination of personal and family history factors may inform cancer risk and medical management recommendations.   Population cancer screening options, such as those recommended by the American Cancer Society and the National Comprehensive Cancer Network (NCCN) are appropriate for many families at average risk for cancer.  However, earlier and/or more frequent screening may be recommended based on personal factors (lifestyle, exposures, medications, screening results), family history of cancer, and sometimes genetic factors.  These cancer risk management options should be discussed in more detail with an individual's medical providers.      Please call us if you have any questions or concerns.   Cancer Risk Management Program (Appointments: [phone])  John Escobedo, MS [facility] [place]  [phone]  Stefani Montes, MS, [facility] [place] [phone]  Jennifer Allen, MS, [facility] [place]  [phone]  Lauren Knowles, MS, [facility] [place]  [phone]  Antonia Ingram, MS, [facility] [place]  [phone]  Leticia Cash, MS, [facility] [place] [phone]  Nga Madison, MS, [facility] [place] [phone]

## 2025-01-22 NOTE — LETTER
"1/22/2025      Gale Adams  [address] [facility] [address]      Dear Colleague,    Thank you for referring your patient, Gale Adams, to the [facility]. Please see a copy of my visit note below.    1/22/2025    Virtual Visit Details  Type of service:  Video Visit   Originating Location (pt. Location): Other - office  Distant Location (provider location):  Off-site  Platform used for Video Visit: [place]    Referring Provider: DAMON Aden CNP    Presenting Information:  I spoke to Jessy by video today to discuss her genetic testing results. We last met on 1/9/2025 and her blood was drawn on 1/9/2025. The [facility] Common Hereditary Cancers Panel was ordered from [facility]. This testing was done because of Jessy's family history of cancer and BRCA2 gene mutation.    Genetic Testing Result: NEGATIVE  Jessy is negative for mutations in the APC, EDITH, AXIN2, BAP1, BARD1, BMPR1A, BRCA1, BRCA2, BRIP1, CDH1, CDK4, CDKN2A, CHEK2, CTNNA1, DICER1, EPCAM, FH, GREM1, HOXB13, KIT, MBD4, MEN1, MLH1, MSH2, MSH3, MSH6, MUTYH, NF1, NTHL1, PALB2, PDGFRA, PMS2, POLD1, POLE, PTEN, RAD51C, RAD51D, SDHA, SDHB, SDHC, SDHD, SMAD4, SMARCA4, STK11, TP53, TSC1, TSC2, and VHL genes.      No mutations were found in any of the 48 genes analyzed. This test involved sequencing and deletion/duplication analysis of all genes with the exceptions of EPCAM and GREM1 (deletions/duplications only) and SDHA (sequencing only).      A copy of the test report can be found in the Laboratory tab, dated 1/9/2025, and named \"LABORATORY MISCELLANEOUS RESULT.\" The report is scanned in as a linked document.    Interpretation:  We discussed several different interpretations of this negative test result.    One explanation may be that her relatives with cancer did have a mutation in one of these 48 genes and Jessy did not inherit it.  We specifically discussed that Jessy was not found to carry a mutation in the BRCA2 " gene. This means she is not expected to carry the BRCA2 mutation reportedly identified in her paternal uncle, nor the associated cancer risks.  That being said, we discussed that this interpretation of Jessy's results cannot be confirmed without a copy of her uncle's genetic testing report. Jessy verbalized understanding and will speak to her uncle about getting a copy of his report.  Another explanation may be that there is a different gene or combination of genes and environment that are associated with some the cancers in this family that are not identifiable using this genetic test. As such, Jessy is encouraged to contact me regularly to review any new genetic testing options that may be appropriate for her.  There is also a small possibility that there is a mutation in one of these genes, and the testing laboratory could not find it with their current testing methods.       Cancer Screening:  Based on this negative test result, it is important for Jessy and her relatives to refer back to the family history for appropriate cancer screening.    Based on her personal and family history, Jessy has a 26% lifetime risk of developing breast cancer based on the MIGUEL 8 model. As such, Jessy meets current National Comprehensive Cancer Network (NCCN) guidelines for high risk breast screening. This includes annual breast MRI in addition to annual mammogram beginning at age 40. In addition, Jessy should be receiving clinical breast exams by her physician. As this screening would not begin for seven more years, Jessy is encouraged to readdress updated breast screening recommendations with her medical providers when she is nearing age 41.  Jessy's close female relatives also remain at increased risk for breast cancer given their family history. Breast cancer screening is generally recommended to begin approximately 10 years younger than the earliest age of breast cancer diagnosis in the family, or at age 40, whichever comes  first. In this family, screening may begin at age 40. Breast screening options should be discussed with an individual's primary care provider and a Genetic Counselor, to determine at what age to begin screening, what screening is appropriate, and if additional screening (such as breast MRI) is necessary based on personal/family history factors.  Other population cancer screening options, such as those recommended by the American Cancer Society and NCCN, are also appropriate for Jessy and her family. These screening recommendations may change if there are changes to Jessy's personal and/or family history of cancer. Final screening recommendations should be made in consultation with each individual's primary care provider.      Inheritance:  We reviewed the autosomal dominant inheritance of mutations in these 48 genes. We discussed that if Jessy has biological children, she cannot pass on an identifiable mutation in these genes to her children based on this test result. Mutations in these genes do not skip generations.      Additional Testing Considerations:  Although Jessy's genetic testing result was negative, other relatives may still carry the familial BRCA2 gene mutation and/or a different gene mutation associated with hereditary cancer. Genetic counseling is recommended for Jessy's mother, extended maternal relatives, brother, father, and extended paternal relatives to discuss their genetic testing options. If any of these relatives do pursue genetic testing, Jessy is encouraged to contact me so that we may review the impact of their test results on her.    Summary:  Jessy was not found to carry a mutation in the BRCA2 gene, nor a mutation in the 47 other genes included in her genetic test. While no genetic changes were identified, Jessy may still be at risk for certain cancers due to family history, environmental factors, or other genetic causes not identified by this test. Because of that, it is important that  she continue with cancer screening based on her personal and family history as discussed above.    Genetic testing is rapidly advancing, and new cancer susceptibility genes will most likely be identified in the future. Therefore, I encouraged Jessy to contact me regularly or if there are changes in her personal or family history. This may change how we assess her cancer risk, screening, and the testing we would offer.    Plan:  1. Jessy was provided a copy of her test results via [facility].  2. She plans to follow-up with her medical providers as discussed above.  3. She should contact me periodically, or if her personal or family history of cancer changes.    I spent 30 minutes on the date of the encounter doing chart review, history and exam, documentation and further activities as noted above.    Leticia Cash MS, [facility] [place]  Licensed, Certified Genetic Counselor  Office: [phone]  [email or web address]      Again, thank you for allowing me to participate in the care of your patient.        Sincerely,        Leticia Cash GC    Electronically signed

## 2025-01-22 NOTE — NURSING NOTE
Current patient location:  Work    Is the patient currently in the state [facility]? YES    Visit mode: VIDEO    If the visit is dropped, the patient can be reconnected by:VIDEO VISIT: Text to cell phone:   Telephone Information:   Mobile [phone]       Will anyone else be joining the visit? NO  (If patient encounters technical issues they should call [phone]    Are changes needed to the allergy or medication list? N/A    Are refills needed on medications prescribed by this physician? NO    Rooming Documentation:  Questionnaire(s) not done per department protocol    Reason for visit: RECHECK    Renato VARGASF

## 2025-05-27 ENCOUNTER — TELEPHONE (OUTPATIENT)
Dept: FAMILY MEDICINE | Facility: OTHER | Age: 35
End: 2025-05-27

## 2025-05-27 ENCOUNTER — VIRTUAL VISIT (OUTPATIENT)
Dept: FAMILY MEDICINE | Facility: OTHER | Age: 35
End: 2025-05-27
Attending: NURSE PRACTITIONER
Payer: COMMERCIAL
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
## 2025-05-27 DIAGNOSIS — S90.561A TICK BITE OF RIGHT ANKLE, INITIAL ENCOUNTER: Primary | ICD-10-CM

## 2025-05-27 RX ORDER — DOXYCYCLINE 100 MG/1
200 CAPSULE ORAL ONCE
Qty: 2 CAPSULE | Refills: 0 | Status: SHIPPED | OUTPATIENT
Start: 2025-05-27 | End: 2025-05-27

## 2025-05-27 NOTE — PATIENT INSTRUCTIONS
Use sunscreen skin protection clothing for the next few days     Use Deet tick protection when out just was off at the end of the day

## 2025-05-27 NOTE — TELEPHONE ENCOUNTER
10:02 AM    Reason for Call: OVERBOOK    Patient is having the following symptoms: patient has tick bite this past weekend, patient is not sure.      The patient is requesting an appointment for exam today with ANNIE Landa.    Was an appointment offered for this call? No  If yes : Appointment type              Date    Preferred method for responding to this message: Telephone Call  What is your phone number ? [phone]     If we cannot reach you directly, may we leave a detailed response at the number you provided? Yes    Can this message wait until your PCP/provider returns, if unavailable today? Not applicable    Paula Nuñez

## 2025-05-27 NOTE — PROGRESS NOTES
"Jessy is a 34 year old who is being evaluated via a billable video visit.    How would you like to obtain your AVS? MyChart  If the video visit is dropped, the invitation should be resent by: Text to cell phone: [phone]  Will anyone else be joining your video visit? No      Assessment & Plan     Tick bite of right ankle, initial encounter  Tick bite over the weekend   Did end up having a ring around the bite.  Will treat with one time does   Consider screening for tick born disease in 4-6 weeks if she develops any symptoms   If rash worsens consider treating with a course of doxy for possible cellulitis   - doxycycline hyclate (VIBRAMYCIN) 100 MG capsule; Take 2 capsules (200 mg) by mouth once for 1 dose.    Long discussion about preventing tick disease  And skin protection when using Doxy in the summer, such as sunscreen and skin protective clothing even the head,  should wear a hat       I spent a total of 10 minutes on the day of the visit.   Time spent by me today doing chart review, history and exam, documentation and further activities per the note    BMI  Estimated body mass index is 33.91 kg/m  as calculated from the following:    Height as of 8/16/24: 1.684 m (5' 6.3\").    Weight as of 8/16/24: 96.2 kg (212 lb).   Weight management plan: Discussed healthy diet and exercise guidelines      Follow-up   if no improvement or any concerns     Subjective   Jessy is a 34 year old, presenting for the following health issues:  Insect Bites    HPI      Concern - Tick bite  Onset: noticed that she had a tick bite over the weekend, does see a ring   Description: tick bite  Intensity: mild  Progression of Symptoms:  same  Accompanying Signs & Symptoms: none  Previous history of similar problem: none  Precipitating factors:        Worsened by: none  Alleviating factors:        Improved by: none  Therapies tried and outcome: feeling good today, did have a headache yesterday         Review of Systems  CONSTITUTIONAL: " NEGATIVE for fever, chills, change in weight  INTEGUMENTARY/SKIN: ring around area bite on right ankle   RESP: NEGATIVE for significant cough or SOB  CV: NEGATIVE for chest pain, palpitations or peripheral edema      Objective           Vitals:  No vitals were obtained today due to virtual visit.    Physical Exam   GENERAL: alert and no distress  EYES: Eyes grossly normal to inspection.  No discharge or erythema, or obvious scleral/conjunctival abnormalities.  RESP: No audible wheeze, cough, or visible cyanosis.    SKIN: Visible skin clear. No significant rash, abnormal pigmentation or lesions.  NEURO: Cranial nerves grossly intact.  Mentation and speech appropriate for age.  PSYCH: Appropriate affect, tone, and pace of words    Tick panel in the future if concerns and not improving       Video-Visit Details    Type of service:  Video Visit   Originating Location (pt. Location): Home    Distant Location (provider location):  On-site  Platform used for Video Visit: [facility]  Signed Electronically by: DAMON Soler CNP

## 2025-06-17 ENCOUNTER — PATIENT OUTREACH (OUTPATIENT)
Dept: CARE COORDINATION | Facility: CLINIC | Age: 35
End: 2025-06-17

## 2025-08-01 ENCOUNTER — MYC MEDICAL ADVICE (OUTPATIENT)
Dept: FAMILY MEDICINE | Facility: OTHER | Age: 35
End: 2025-08-01

## 2025-08-27 ENCOUNTER — OFFICE VISIT (OUTPATIENT)
Dept: FAMILY MEDICINE | Facility: OTHER | Age: 35
End: 2025-08-27
Attending: NURSE PRACTITIONER
Payer: COMMERCIAL

## 2025-08-27 ENCOUNTER — RESULTS FOLLOW-UP (OUTPATIENT)
Dept: FAMILY MEDICINE | Facility: OTHER | Age: 35
End: 2025-08-27

## 2025-08-27 VITALS
HEART RATE: 88 BPM | WEIGHT: 207 LBS | DIASTOLIC BLOOD PRESSURE: 80 MMHG | OXYGEN SATURATION: 99 % | BODY MASS INDEX: 33.11 KG/M2 | SYSTOLIC BLOOD PRESSURE: 120 MMHG | TEMPERATURE: 97 F | RESPIRATION RATE: 18 BRPM
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
## 2025-08-27 DIAGNOSIS — M25.50 CHRONIC JOINT PAIN: ICD-10-CM

## 2025-08-27 DIAGNOSIS — G89.29 CHRONIC JOINT PAIN: ICD-10-CM

## 2025-08-27 DIAGNOSIS — S90.561D TICK BITE OF RIGHT ANKLE, SUBSEQUENT ENCOUNTER: Primary | ICD-10-CM

## 2025-08-27 DIAGNOSIS — R53.82 CHRONIC FATIGUE: ICD-10-CM

## 2025-08-27 DIAGNOSIS — Z13.1 SCREENING FOR DIABETES MELLITUS: ICD-10-CM

## 2025-08-27 PROBLEM — E28.2 PCOS (POLYCYSTIC OVARIAN SYNDROME): Status: ACTIVE | Noted: 2024-10-09

## 2025-08-27 LAB
ALBUMIN SERPL BCG-MCNC: 4.3 G/DL (ref 3.5–5.2)
ALP SERPL-CCNC: 72 U/L (ref 40–150)
ALT SERPL W P-5'-P-CCNC: 20 U/L (ref 0–50)
ANION GAP SERPL CALCULATED.3IONS-SCNC: 12 MMOL/L (ref 7–15)
AST SERPL W P-5'-P-CCNC: 16 U/L (ref 0–45)
BASOPHILS # BLD AUTO: 0.04 10E3/UL (ref 0–0.2)
BASOPHILS NFR BLD AUTO: 0.8 %
BILIRUB SERPL-MCNC: 0.3 MG/DL
BUN SERPL-MCNC: 12.6 MG/DL (ref 6–20)
CALCIUM SERPL-MCNC: 9.3 MG/DL (ref 8.8–10.4)
CHLORIDE SERPL-SCNC: 102 MMOL/L (ref 98–107)
CREAT SERPL-MCNC: 0.71 MG/DL (ref 0.51–0.95)
CRP SERPL-MCNC: <3 MG/L
EGFRCR SERPLBLD CKD-EPI 2021: >90 ML/MIN/1.73M2
EOSINOPHIL # BLD AUTO: 0.12 10E3/UL (ref 0–0.7)
EOSINOPHIL NFR BLD AUTO: 2.4 %
ERYTHROCYTE [DISTWIDTH] IN BLOOD BY AUTOMATED COUNT: 12.4 % (ref 10–15)
ERYTHROCYTE [SEDIMENTATION RATE] IN BLOOD BY WESTERGREN METHOD: 14 MM/HR (ref 0–20)
EST. AVERAGE GLUCOSE BLD GHB EST-MCNC: 105 MG/DL
FERRITIN SERPL-MCNC: 26 NG/ML (ref 6–175)
GLUCOSE SERPL-MCNC: 89 MG/DL (ref 70–99)
HBA1C MFR BLD: 5.3 %
HCO3 SERPL-SCNC: 25 MMOL/L (ref 22–29)
HCT VFR BLD AUTO: 39.5 % (ref 35–47)
HGB BLD-MCNC: 13.2 G/DL (ref 11.7–15.7)
IMM GRANULOCYTES # BLD: <0.03 10E3/UL
IMM GRANULOCYTES NFR BLD: 0.2 %
IRON BINDING CAPACITY (ROCHE): 359 UG/DL (ref 240–430)
IRON SATN MFR SERPL: 17 % (ref 15–46)
IRON SERPL-MCNC: 62 UG/DL (ref 37–145)
LYMPHOCYTES # BLD AUTO: 1.52 10E3/UL (ref 0.8–5.3)
LYMPHOCYTES NFR BLD AUTO: 31 %
MAGNESIUM SERPL-MCNC: 2.1 MG/DL (ref 1.7–2.3)
MCH RBC QN AUTO: 29 PG (ref 26.5–33)
MCHC RBC AUTO-ENTMCNC: 33.4 G/DL (ref 31.5–36.5)
MCV RBC AUTO: 86.8 FL (ref 78–100)
MONOCYTES # BLD AUTO: 0.44 10E3/UL (ref 0–1.3)
MONOCYTES NFR BLD AUTO: 9 %
NEUTROPHILS # BLD AUTO: 2.78 10E3/UL (ref 1.6–8.3)
NEUTROPHILS NFR BLD AUTO: 56.6 %
NRBC # BLD AUTO: <0.03 10E3/UL
NRBC BLD AUTO-RTO: 0 /100
PLATELET # BLD AUTO: 255 10E3/UL (ref 150–450)
POTASSIUM SERPL-SCNC: 4.2 MMOL/L (ref 3.4–5.3)
PROT SERPL-MCNC: 7.7 G/DL (ref 6.4–8.3)
RBC # BLD AUTO: 4.55 10E6/UL (ref 3.8–5.2)
RHEUMATOID FACT SERPL-ACNC: <10 IU/ML
SODIUM SERPL-SCNC: 139 MMOL/L (ref 135–145)
WBC # BLD AUTO: 4.91 10E3/UL (ref 4–11)

## 2025-08-27 RX ORDER — METFORMIN HYDROCHLORIDE 750 MG/1
750 TABLET, EXTENDED RELEASE ORAL
COMMUNITY

## 2025-08-27 ASSESSMENT — PAIN SCALES - GENERAL: PAINLEVEL_OUTOF10: NO PAIN (0)

## 2025-08-28 LAB
A PHAGOCYTOPH DNA BLD QL NAA+PROBE: NOT DETECTED
ANA PAT SER IF-IMP: ABNORMAL
ANA SER QL IF: ABNORMAL
ANA TITR SER IF: ABNORMAL {TITER}
B BURGDOR IGG+IGM SER QL: 0.29
BABESIA DNA BLD QL NAA+PROBE: NOT DETECTED
EHRLICHIA DNA SPEC QL NAA+PROBE: NOT DETECTED

## 2025-08-30 LAB
B19V IGG SER IA-ACNC: 0.56 IV
B19V IGM SER IA-ACNC: 0.45 IV